# Patient Record
Sex: MALE | Race: WHITE | Employment: FULL TIME | URBAN - METROPOLITAN AREA
[De-identification: names, ages, dates, MRNs, and addresses within clinical notes are randomized per-mention and may not be internally consistent; named-entity substitution may affect disease eponyms.]

---

## 2019-12-16 NOTE — LETTER
[address]  [address]  Authorization for Surgical Operation and Procedure     Date:___________                                                                                                         Time:__________  I hereby authorize Surgeon(s):  Dex Jane DO, my physician and his/her assistants (if applicable), which may include medical students, residents, and/or fellows, to perform the following surgical operation/ procedure and administer such anesthesia as may be determined necessary by my physician:  Operation/Procedure name (s) Procedure(s):  CYSTOSCOPY, POSSIBLE LEFT RETROGRADE PYELOGRAM, REMOVAL OF LEFT URETERAL STENT on Augustus H Egan   2.   I recognize that during the surgical operation/procedure, unforeseen conditions may necessitate additional or different procedures than those listed above.  I, therefore, further authorize and request that the above-named surgeon, assistants, or designees perform such procedures as are, in their judgment, necessary and desirable.    3.   My surgeon/physician has discussed prior to my surgery the potential benefits, risks and side effects of this procedure; the likelihood of achieving goals; and potential problems that might occur during recuperation.  They also discussed reasonable alternatives to the procedure, including risks, benefits, and side effects related to the alternatives and risks related to not receiving this procedure.  I have had all my questions answered and I acknowledge that no guarantee has been made as to the result that may be obtained.    4.   Should the need arise during my operation/procedure, which includes change of level of care prior to discharge, I also consent to the administration of blood and/or blood products.  Further, I understand that despite careful testing and screening of blood or blood products by collecting agencies, I may still be subject to ill effects as a result of  receiving a blood transfusion and/or blood products.  The following are some, but not all, of the potential risks that can occur: fever and allergic reactions, hemolytic reactions, transmission of diseases such as Hepatitis, AIDS and Cytomegalovirus (CMV) and fluid overload.  In the event that I wish to have an autologous transfusion of my own blood, or a directed donor transfusion, I will discuss this with my physician.  Check only if Refusing Blood or Blood Products  I understand refusal of blood or blood products as deemed necessary by my physician may have serious consequences to my condition to include possible death. I hereby assume responsibility for my refusal and release the hospital, its personnel, and my physicians from any responsibility for the consequences of my refusal.          o  Refuse      5.   I authorize the use of any specimen, organs, tissues, body parts or foreign objects that may be removed from my body during the operation/procedure for diagnosis, research or teaching purposes and their subsequent disposal by hospital authorities.  I also authorize the release of specimen test results and/or written reports to my treating physician on the hospital medical staff or other referring or consulting physicians involved in my care, at the discretion of the Pathologist or my treating physician.    6.   I consent to the photographing or videotaping of the operations or procedures to be performed, including appropriate portions of my body for medical, scientific, or educational purposes, provided my identity is not revealed by the pictures or by descriptive texts accompanying them.  If the procedure has been photographed/videotaped, the surgeon will obtain the original picture, image, videotape or CD.  The hospital will not be responsible for storage, release or maintenance of the picture, image, tape or CD.    7.   I consent to the presence of a  or observers in the operating room  as deemed necessary by my physician or their designees.    8.   I recognize that in the event my procedure results in extended X-Ray/fluoroscopy time, I may develop a skin reaction.    9. If I have a Do Not Attempt Resuscitation (DNAR) order in place, that status will be suspended while in the operating room, procedural suite, and during the recovery period unless otherwise explicitly stated by me (or a person authorized to consent on my behalf). The surgeon or my attending physician will determine when the applicable recovery period ends for purposes of reinstating the DNAR order.  10. Patients having a sterilization procedure: I understand that if the procedure is successful the results will be permanent and it will therefore be impossible for me to inseminate, conceive, or bear children.  I also understand that the procedure is intended to result in sterility, although the result has not been guaranteed.   11. I acknowledge that my physician has explained sedation/analgesia administration to me including the risk and benefits I consent to the administration of sedation/analgesia as may be necessary or desirable in the judgment of my physician.    I CERTIFY THAT I HAVE READ AND FULLY UNDERSTAND THE ABOVE CONSENT TO OPERATION and/or OTHER PROCEDURE.    _________________________________________  __________________________________  Signature of Patient     Signature of Responsible Person         ___________________________________         Printed Name of Responsible Person           _________________________________                 Relationship to Patient  _________________________________________  ______________________________  Signature of Witness          Date  Time      Patient Name: Austin Mcfarland     : 1990                 Printed: 2025     Medical Record #: [id number]                     Page 1 of 2                                    [address]   [id number]    Consent for Anesthesia    IAustin agree to be cared for by an anesthesiologist, who is specially trained to monitor me and give me medicine to put me to sleep or keep me comfortable during my procedure    I understand that my anesthesiologist is not an employee or agent of [facility] or [employer]. He or she works for [employer].    As the patient asking for anesthesia services, I agree to:  Allow the anesthesiologist (anesthesia doctor) to give me medicine and do additional procedures as necessary. Some examples are: Starting or using an “IV” to give me medicine, fluids or blood during my procedure, and having a breathing tube placed to help me breathe when I’m asleep (intubation). In the event that my heart stops working properly, I understand that my anesthesiologist will make every effort to sustain my life, unless otherwise directed by [facility] Do Not Resuscitate documents.  Tell my anesthesia doctor before my procedure:  If I am pregnant.  The last time that I ate or drank.  All of the medicines I take (including prescriptions, herbal supplements, and pills I can buy without a prescription (including street drugs/illegal medications). Failure to inform my anesthesiologist about these medicines may increase my risk of anesthetic complications.  If I am allergic to anything or have had a reaction to anesthesia before.  I understand how the anesthesia medicine will help me (benefits).  I understand that with any type of anesthesia medicine there are risks:  The most common risks are: nausea, vomiting, sore throat, muscle soreness, damage to my eyes, mouth, or teeth (from breathing tube placement).  Rare risks include: remembering what happened during my procedure, allergic reactions to medications, injury to my airway, heart, lungs, vision, nerves, or muscles and in extremely rare instances death.  My doctor has explained to me other choices available  to me for my care (alternatives).  Pregnant Patients (“epidural”):  I understand that the risks of having an epidural (medicine given into my back to help control pain during labor), include itching, low blood pressure, difficulty urinating, headache or slowing of the baby’s heart. Very rare risks include infection, bleeding, seizure, irregular heart rhythms and nerve injury.  Regional Anesthesia (“spinal”, “epidural”, & “nerve blocks”):  I understand that rare but potential complications include headache, bleeding, infection, seizure, irregular heart rhythms, and nerve injury.    I can change my mind about having anesthesia services at any time before I get the medicine.    _____________________________________________________________________________  Patient (or Representative) Signature/Relationship to Patient  Date   Time    _____________________________________________________________________________   Name (if used)    Language/Organization   Time    _____________________________________________________________________________  Anesthesiologist Signature     Date   Time  I have discussed the procedure and information above with the patient (or patient’s representative) and answered their questions. The patient or their representative has agreed to have anesthesia services.    _____________________________________________________________________________  Witness        Date   Time  I have verified that the signature is that of the patient or patient’s representative, and that it was signed before the procedure  Patient Name: Austin Mcfarland     : 1990                 Printed: 2025     Medical Record #: [id number]                     Page 2 of 2   Patient undergoing workup for TAVR, Feeling better. I ordered his Cardiac CT, which will hopefully be done tomorrow. TAVR to be done as an outpatient. When medically stable, would discharge him to home. He has an HHA with him 5 days a week.

## 2022-01-21 ENCOUNTER — LAB REQUISITION (OUTPATIENT)
Dept: LAB | Facility: HOSPITAL | Age: 32
End: 2022-01-21
Payer: COMMERCIAL

## 2022-01-21 DIAGNOSIS — R10.9 UNSPECIFIED ABDOMINAL PAIN: ICD-10-CM

## 2022-01-21 DIAGNOSIS — N13.30 UNSPECIFIED HYDRONEPHROSIS: ICD-10-CM

## 2022-01-21 PROCEDURE — 82365 CALCULUS SPECTROSCOPY: CPT | Performed by: UROLOGY

## 2022-01-21 PROCEDURE — 88300 SURGICAL PATH GROSS: CPT | Performed by: UROLOGY

## 2022-01-26 LAB — CALCULI MASS: 4 MG

## 2022-05-24 ENCOUNTER — LAB ENCOUNTER (OUTPATIENT)
Dept: LAB | Age: 32
End: 2022-05-24
Attending: UROLOGY
Payer: COMMERCIAL

## 2022-05-24 ENCOUNTER — NURSE ONLY (OUTPATIENT)
Dept: LAB | Age: 32
End: 2022-05-24
Attending: UROLOGY
Payer: COMMERCIAL

## 2022-05-24 DIAGNOSIS — Z20.822 ENCOUNTER FOR PREOPERATIVE SCREENING LABORATORY TESTING FOR COVID-19 VIRUS: ICD-10-CM

## 2022-05-24 DIAGNOSIS — Z01.812 ENCOUNTER FOR PREOPERATIVE SCREENING LABORATORY TESTING FOR COVID-19 VIRUS: ICD-10-CM

## 2022-05-24 DIAGNOSIS — N13.30 HYDRONEPHROSIS, UNSPECIFIED HYDRONEPHROSIS TYPE: ICD-10-CM

## 2022-05-24 PROCEDURE — 86901 BLOOD TYPING SEROLOGIC RH(D): CPT

## 2022-05-24 PROCEDURE — 86900 BLOOD TYPING SEROLOGIC ABO: CPT

## 2022-05-24 PROCEDURE — 86850 RBC ANTIBODY SCREEN: CPT

## 2022-05-24 PROCEDURE — 36415 COLL VENOUS BLD VENIPUNCTURE: CPT

## 2022-05-25 ENCOUNTER — ANESTHESIA EVENT (OUTPATIENT)
Dept: SURGERY | Facility: HOSPITAL | Age: 32
End: 2022-05-25
Payer: COMMERCIAL

## 2022-05-25 LAB
ANTIBODY SCREEN: NEGATIVE
RH BLOOD TYPE: POSITIVE
SARS-COV-2 RNA RESP QL NAA+PROBE: NOT DETECTED

## 2022-05-27 ENCOUNTER — APPOINTMENT (OUTPATIENT)
Dept: GENERAL RADIOLOGY | Facility: HOSPITAL | Age: 32
End: 2022-05-27
Attending: UROLOGY
Payer: COMMERCIAL

## 2022-05-27 ENCOUNTER — HOSPITAL ENCOUNTER (INPATIENT)
Facility: HOSPITAL | Age: 32
LOS: 3 days | Discharge: HOME | DRG: 661 | End: 2022-05-30
Attending: UROLOGY | Admitting: UROLOGY
Payer: COMMERCIAL

## 2022-05-27 ENCOUNTER — HOSPITAL ENCOUNTER (INPATIENT)
Facility: HOSPITAL | Age: 32
LOS: 3 days | Discharge: HOME | End: 2022-05-30
Attending: UROLOGY | Admitting: UROLOGY
Payer: COMMERCIAL

## 2022-05-27 ENCOUNTER — ANESTHESIA (OUTPATIENT)
Dept: SURGERY | Facility: HOSPITAL | Age: 32
End: 2022-05-27
Payer: COMMERCIAL

## 2022-05-27 ENCOUNTER — APPOINTMENT (OUTPATIENT)
Dept: GENERAL RADIOLOGY | Facility: HOSPITAL | Age: 32
DRG: 661 | End: 2022-05-27
Attending: UROLOGY
Payer: COMMERCIAL

## 2022-05-27 DIAGNOSIS — N13.30 HYDRONEPHROSIS, UNSPECIFIED HYDRONEPHROSIS TYPE: ICD-10-CM

## 2022-05-27 DIAGNOSIS — Z01.812 ENCOUNTER FOR PREOPERATIVE SCREENING LABORATORY TESTING FOR COVID-19 VIRUS: Primary | ICD-10-CM

## 2022-05-27 DIAGNOSIS — Z20.822 ENCOUNTER FOR PREOPERATIVE SCREENING LABORATORY TESTING FOR COVID-19 VIRUS: Primary | ICD-10-CM

## 2022-05-27 DIAGNOSIS — N13.5 STRICTURE OR KINKING OF URETER: ICD-10-CM

## 2022-05-27 PROCEDURE — 3E0T3BZ INTRODUCTION OF ANESTHETIC AGENT INTO PERIPHERAL NERVES AND PLEXI, PERCUTANEOUS APPROACH: ICD-10-PCS | Performed by: UROLOGY

## 2022-05-27 PROCEDURE — 76942 ECHO GUIDE FOR BIOPSY: CPT | Performed by: ANESTHESIOLOGY

## 2022-05-27 PROCEDURE — 0TU787Z SUPPLEMENT LEFT URETER WITH AUTOLOGOUS TISSUE SUBSTITUTE, VIA NATURAL OR ARTIFICIAL OPENING ENDOSCOPIC: ICD-10-PCS | Performed by: UROLOGY

## 2022-05-27 PROCEDURE — 0TN78ZZ RELEASE LEFT URETER, VIA NATURAL OR ARTIFICIAL OPENING ENDOSCOPIC: ICD-10-PCS | Performed by: UROLOGY

## 2022-05-27 PROCEDURE — 0CB40ZZ EXCISION OF BUCCAL MUCOSA, OPEN APPROACH: ICD-10-PCS | Performed by: OTOLARYNGOLOGY

## 2022-05-27 PROCEDURE — 8E0W7CZ ROBOTIC ASSISTED PROCEDURE OF TRUNK REGION, VIA NATURAL OR ARTIFICIAL OPENING: ICD-10-PCS | Performed by: UROLOGY

## 2022-05-27 PROCEDURE — BT1F1ZZ FLUOROSCOPY OF LEFT KIDNEY, URETER AND BLADDER USING LOW OSMOLAR CONTRAST: ICD-10-PCS | Performed by: UROLOGY

## 2022-05-27 DEVICE — URETERAL STENT WITH SIDE HOLES 6FX28CM
Type: IMPLANTABLE DEVICE | Site: URETER | Status: FUNCTIONAL
Brand: TRIA™ SOFT

## 2022-05-27 RX ORDER — ROCURONIUM BROMIDE 10 MG/ML
INJECTION, SOLUTION INTRAVENOUS AS NEEDED
Status: DISCONTINUED | OUTPATIENT
Start: 2022-05-27 | End: 2022-05-27 | Stop reason: SURG

## 2022-05-27 RX ORDER — KETAMINE HYDROCHLORIDE 50 MG/ML
INJECTION, SOLUTION, CONCENTRATE INTRAMUSCULAR; INTRAVENOUS AS NEEDED
Status: DISCONTINUED | OUTPATIENT
Start: 2022-05-27 | End: 2022-05-27 | Stop reason: SURG

## 2022-05-27 RX ORDER — LIDOCAINE HYDROCHLORIDE ANHYDROUS AND DEXTROSE MONOHYDRATE .8; 5 G/100ML; G/100ML
INJECTION, SOLUTION INTRAVENOUS CONTINUOUS PRN
Status: DISCONTINUED | OUTPATIENT
Start: 2022-05-27 | End: 2022-05-27 | Stop reason: SURG

## 2022-05-27 RX ORDER — BUPIVACAINE HYDROCHLORIDE 2.5 MG/ML
INJECTION, SOLUTION EPIDURAL; INFILTRATION; INTRACAUDAL AS NEEDED
Status: DISCONTINUED | OUTPATIENT
Start: 2022-05-27 | End: 2022-05-27 | Stop reason: HOSPADM

## 2022-05-27 RX ORDER — SENNOSIDES 8.6 MG
17.2 TABLET ORAL NIGHTLY PRN
Status: DISCONTINUED | OUTPATIENT
Start: 2022-05-27 | End: 2022-05-30

## 2022-05-27 RX ORDER — HYDROMORPHONE HYDROCHLORIDE 1 MG/ML
0.6 INJECTION, SOLUTION INTRAMUSCULAR; INTRAVENOUS; SUBCUTANEOUS EVERY 5 MIN PRN
Status: DISCONTINUED | OUTPATIENT
Start: 2022-05-27 | End: 2022-05-27 | Stop reason: HOSPADM

## 2022-05-27 RX ORDER — BUPIVACAINE HYDROCHLORIDE 2.5 MG/ML
INJECTION, SOLUTION EPIDURAL; INFILTRATION; INTRACAUDAL AS NEEDED
Status: DISCONTINUED | OUTPATIENT
Start: 2022-05-27 | End: 2022-05-27 | Stop reason: SURG

## 2022-05-27 RX ORDER — DIPHENHYDRAMINE HCL 25 MG
25 CAPSULE ORAL NIGHTLY PRN
Status: DISCONTINUED | OUTPATIENT
Start: 2022-05-27 | End: 2022-05-30

## 2022-05-27 RX ORDER — CHLORHEXIDINE GLUCONATE 0.12 MG/ML
15 RINSE ORAL 2 TIMES DAILY
Status: DISCONTINUED | OUTPATIENT
Start: 2022-05-27 | End: 2022-05-30

## 2022-05-27 RX ORDER — DIPHENHYDRAMINE HYDROCHLORIDE 50 MG/ML
25 INJECTION INTRAMUSCULAR; INTRAVENOUS ONCE
Status: COMPLETED | OUTPATIENT
Start: 2022-05-27 | End: 2022-05-27

## 2022-05-27 RX ORDER — HYDROMORPHONE HYDROCHLORIDE 1 MG/ML
0.4 INJECTION, SOLUTION INTRAMUSCULAR; INTRAVENOUS; SUBCUTANEOUS EVERY 2 HOUR PRN
Status: DISCONTINUED | OUTPATIENT
Start: 2022-05-27 | End: 2022-05-30

## 2022-05-27 RX ORDER — HYDROMORPHONE HYDROCHLORIDE 1 MG/ML
0.2 INJECTION, SOLUTION INTRAMUSCULAR; INTRAVENOUS; SUBCUTANEOUS EVERY 5 MIN PRN
Status: DISCONTINUED | OUTPATIENT
Start: 2022-05-27 | End: 2022-05-27 | Stop reason: HOSPADM

## 2022-05-27 RX ORDER — OXYCODONE HYDROCHLORIDE 10 MG/1
10 TABLET ORAL EVERY 4 HOURS PRN
Status: DISCONTINUED | OUTPATIENT
Start: 2022-05-27 | End: 2022-05-28

## 2022-05-27 RX ORDER — HYDROCODONE BITARTRATE AND ACETAMINOPHEN 5; 325 MG/1; MG/1
2 TABLET ORAL ONCE AS NEEDED
Status: DISCONTINUED | OUTPATIENT
Start: 2022-05-27 | End: 2022-05-27 | Stop reason: HOSPADM

## 2022-05-27 RX ORDER — ONDANSETRON 2 MG/ML
INJECTION INTRAMUSCULAR; INTRAVENOUS AS NEEDED
Status: DISCONTINUED | OUTPATIENT
Start: 2022-05-27 | End: 2022-05-27 | Stop reason: SURG

## 2022-05-27 RX ORDER — HYDROMORPHONE HYDROCHLORIDE 1 MG/ML
INJECTION, SOLUTION INTRAMUSCULAR; INTRAVENOUS; SUBCUTANEOUS AS NEEDED
Status: DISCONTINUED | OUTPATIENT
Start: 2022-05-27 | End: 2022-05-27 | Stop reason: SURG

## 2022-05-27 RX ORDER — HYDROMORPHONE HYDROCHLORIDE 1 MG/ML
0.4 INJECTION, SOLUTION INTRAMUSCULAR; INTRAVENOUS; SUBCUTANEOUS EVERY 5 MIN PRN
Status: DISCONTINUED | OUTPATIENT
Start: 2022-05-27 | End: 2022-05-27 | Stop reason: HOSPADM

## 2022-05-27 RX ORDER — HYDROMORPHONE HYDROCHLORIDE 1 MG/ML
0.8 INJECTION, SOLUTION INTRAMUSCULAR; INTRAVENOUS; SUBCUTANEOUS EVERY 2 HOUR PRN
Status: DISCONTINUED | OUTPATIENT
Start: 2022-05-27 | End: 2022-05-30

## 2022-05-27 RX ORDER — ONDANSETRON 2 MG/ML
4 INJECTION INTRAMUSCULAR; INTRAVENOUS EVERY 6 HOURS PRN
Status: DISCONTINUED | OUTPATIENT
Start: 2022-05-27 | End: 2022-05-27 | Stop reason: HOSPADM

## 2022-05-27 RX ORDER — SODIUM CHLORIDE, SODIUM LACTATE, POTASSIUM CHLORIDE, CALCIUM CHLORIDE 600; 310; 30; 20 MG/100ML; MG/100ML; MG/100ML; MG/100ML
INJECTION, SOLUTION INTRAVENOUS CONTINUOUS
Status: DISCONTINUED | OUTPATIENT
Start: 2022-05-27 | End: 2022-05-27

## 2022-05-27 RX ORDER — PHENAZOPYRIDINE HYDROCHLORIDE 100 MG/1
200 TABLET, FILM COATED ORAL 3 TIMES DAILY PRN
Status: DISCONTINUED | OUTPATIENT
Start: 2022-05-27 | End: 2022-05-30

## 2022-05-27 RX ORDER — DIPHENHYDRAMINE HYDROCHLORIDE 50 MG/ML
12.5 INJECTION INTRAMUSCULAR; INTRAVENOUS NIGHTLY PRN
Status: DISCONTINUED | OUTPATIENT
Start: 2022-05-27 | End: 2022-05-30

## 2022-05-27 RX ORDER — MIDAZOLAM HYDROCHLORIDE 1 MG/ML
1 INJECTION INTRAMUSCULAR; INTRAVENOUS EVERY 5 MIN PRN
Status: DISCONTINUED | OUTPATIENT
Start: 2022-05-27 | End: 2022-05-27 | Stop reason: HOSPADM

## 2022-05-27 RX ORDER — LIDOCAINE HYDROCHLORIDE 10 MG/ML
INJECTION, SOLUTION EPIDURAL; INFILTRATION; INTRACAUDAL; PERINEURAL AS NEEDED
Status: DISCONTINUED | OUTPATIENT
Start: 2022-05-27 | End: 2022-05-27 | Stop reason: SURG

## 2022-05-27 RX ORDER — ONDANSETRON 2 MG/ML
4 INJECTION INTRAMUSCULAR; INTRAVENOUS EVERY 6 HOURS PRN
Status: DISCONTINUED | OUTPATIENT
Start: 2022-05-27 | End: 2022-05-30

## 2022-05-27 RX ORDER — HYDROMORPHONE HYDROCHLORIDE 1 MG/ML
INJECTION, SOLUTION INTRAMUSCULAR; INTRAVENOUS; SUBCUTANEOUS
Status: COMPLETED
Start: 2022-05-27 | End: 2022-05-27

## 2022-05-27 RX ORDER — SCOLOPAMINE TRANSDERMAL SYSTEM 1 MG/1
1 PATCH, EXTENDED RELEASE TRANSDERMAL ONCE
Status: DISCONTINUED | OUTPATIENT
Start: 2022-05-27 | End: 2022-05-27 | Stop reason: HOSPADM

## 2022-05-27 RX ORDER — DEXAMETHASONE SODIUM PHOSPHATE 10 MG/ML
INJECTION, SOLUTION INTRAMUSCULAR; INTRAVENOUS AS NEEDED
Status: DISCONTINUED | OUTPATIENT
Start: 2022-05-27 | End: 2022-05-27 | Stop reason: SURG

## 2022-05-27 RX ORDER — DIPHENHYDRAMINE HYDROCHLORIDE 50 MG/ML
INJECTION INTRAMUSCULAR; INTRAVENOUS
Status: COMPLETED
Start: 2022-05-27 | End: 2022-05-27

## 2022-05-27 RX ORDER — SODIUM CHLORIDE, SODIUM LACTATE, POTASSIUM CHLORIDE, CALCIUM CHLORIDE 600; 310; 30; 20 MG/100ML; MG/100ML; MG/100ML; MG/100ML
INJECTION, SOLUTION INTRAVENOUS CONTINUOUS
Status: DISCONTINUED | OUTPATIENT
Start: 2022-05-27 | End: 2022-05-27 | Stop reason: HOSPADM

## 2022-05-27 RX ORDER — MIDAZOLAM HYDROCHLORIDE 1 MG/ML
INJECTION INTRAMUSCULAR; INTRAVENOUS AS NEEDED
Status: DISCONTINUED | OUTPATIENT
Start: 2022-05-27 | End: 2022-05-27 | Stop reason: SURG

## 2022-05-27 RX ORDER — OXYCODONE HYDROCHLORIDE 5 MG/1
5 TABLET ORAL EVERY 4 HOURS PRN
Status: DISCONTINUED | OUTPATIENT
Start: 2022-05-27 | End: 2022-05-28

## 2022-05-27 RX ORDER — HYDROCODONE BITARTRATE AND ACETAMINOPHEN 5; 325 MG/1; MG/1
1 TABLET ORAL ONCE AS NEEDED
Status: DISCONTINUED | OUTPATIENT
Start: 2022-05-27 | End: 2022-05-27 | Stop reason: HOSPADM

## 2022-05-27 RX ORDER — ACETAMINOPHEN 325 MG/1
650 TABLET ORAL
Status: DISCONTINUED | OUTPATIENT
Start: 2022-05-27 | End: 2022-05-28

## 2022-05-27 RX ORDER — ACETAMINOPHEN 500 MG
1000 TABLET ORAL ONCE
Status: DISCONTINUED | OUTPATIENT
Start: 2022-05-27 | End: 2022-05-27 | Stop reason: HOSPADM

## 2022-05-27 RX ORDER — SODIUM CHLORIDE 9 MG/ML
INJECTION, SOLUTION INTRAVENOUS CONTINUOUS
Status: DISCONTINUED | OUTPATIENT
Start: 2022-05-27 | End: 2022-05-28

## 2022-05-27 RX ORDER — ACETAMINOPHEN 500 MG
1000 TABLET ORAL ONCE AS NEEDED
Status: DISCONTINUED | OUTPATIENT
Start: 2022-05-27 | End: 2022-05-27 | Stop reason: HOSPADM

## 2022-05-27 RX ORDER — DEXAMETHASONE SODIUM PHOSPHATE 4 MG/ML
VIAL (ML) INJECTION AS NEEDED
Status: DISCONTINUED | OUTPATIENT
Start: 2022-05-27 | End: 2022-05-27 | Stop reason: SURG

## 2022-05-27 RX ORDER — CEFAZOLIN SODIUM/WATER 2 G/20 ML
2 SYRINGE (ML) INTRAVENOUS EVERY 8 HOURS
Status: COMPLETED | OUTPATIENT
Start: 2022-05-27 | End: 2022-05-28

## 2022-05-27 RX ORDER — HEPARIN SODIUM 5000 [USP'U]/ML
5000 INJECTION, SOLUTION INTRAVENOUS; SUBCUTANEOUS ONCE
Status: COMPLETED | OUTPATIENT
Start: 2022-05-27 | End: 2022-05-27

## 2022-05-27 RX ORDER — FAMOTIDINE 20 MG/1
20 TABLET, FILM COATED ORAL 2 TIMES DAILY
Status: DISCONTINUED | OUTPATIENT
Start: 2022-05-27 | End: 2022-05-30

## 2022-05-27 RX ORDER — KETOROLAC TROMETHAMINE 30 MG/ML
INJECTION, SOLUTION INTRAMUSCULAR; INTRAVENOUS AS NEEDED
Status: DISCONTINUED | OUTPATIENT
Start: 2022-05-27 | End: 2022-05-27 | Stop reason: SURG

## 2022-05-27 RX ORDER — ACETAMINOPHEN 500 MG
1000 TABLET ORAL EVERY 6 HOURS PRN
COMMUNITY
End: 2022-05-29

## 2022-05-27 RX ORDER — CEFAZOLIN SODIUM/WATER 2 G/20 ML
2 SYRINGE (ML) INTRAVENOUS ONCE
Status: COMPLETED | OUTPATIENT
Start: 2022-05-27 | End: 2022-05-27

## 2022-05-27 RX ORDER — GLYCOPYRROLATE 0.2 MG/ML
INJECTION, SOLUTION INTRAMUSCULAR; INTRAVENOUS AS NEEDED
Status: DISCONTINUED | OUTPATIENT
Start: 2022-05-27 | End: 2022-05-27 | Stop reason: SURG

## 2022-05-27 RX ORDER — NALOXONE HYDROCHLORIDE 0.4 MG/ML
80 INJECTION, SOLUTION INTRAMUSCULAR; INTRAVENOUS; SUBCUTANEOUS AS NEEDED
Status: DISCONTINUED | OUTPATIENT
Start: 2022-05-27 | End: 2022-05-27 | Stop reason: HOSPADM

## 2022-05-27 RX ORDER — ONDANSETRON 4 MG/1
4 TABLET, FILM COATED ORAL EVERY 6 HOURS PRN
Status: DISCONTINUED | OUTPATIENT
Start: 2022-05-27 | End: 2022-05-30

## 2022-05-27 RX ORDER — PROCHLORPERAZINE EDISYLATE 5 MG/ML
5 INJECTION INTRAMUSCULAR; INTRAVENOUS EVERY 8 HOURS PRN
Status: DISCONTINUED | OUTPATIENT
Start: 2022-05-27 | End: 2022-05-27 | Stop reason: HOSPADM

## 2022-05-27 RX ORDER — NEOSTIGMINE METHYLSULFATE 1 MG/ML
INJECTION INTRAVENOUS AS NEEDED
Status: DISCONTINUED | OUTPATIENT
Start: 2022-05-27 | End: 2022-05-27 | Stop reason: SURG

## 2022-05-27 RX ORDER — ENOXAPARIN SODIUM 100 MG/ML
40 INJECTION SUBCUTANEOUS NIGHTLY
Status: DISCONTINUED | OUTPATIENT
Start: 2022-05-27 | End: 2022-05-30

## 2022-05-27 RX ADMIN — ONDANSETRON 4 MG: 2 INJECTION INTRAMUSCULAR; INTRAVENOUS at 12:26:00

## 2022-05-27 RX ADMIN — DEXAMETHASONE SODIUM PHOSPHATE 4 MG: 4 MG/ML VIAL (ML) INJECTION at 08:44:00

## 2022-05-27 RX ADMIN — LIDOCAINE HYDROCHLORIDE ANHYDROUS AND DEXTROSE MONOHYDRATE 2 MG/KG/HR: .8; 5 INJECTION, SOLUTION INTRAVENOUS at 08:42:00

## 2022-05-27 RX ADMIN — ROCURONIUM BROMIDE 20 MG: 10 INJECTION, SOLUTION INTRAVENOUS at 09:24:00

## 2022-05-27 RX ADMIN — BUPIVACAINE HYDROCHLORIDE 25 ML: 2.5 INJECTION, SOLUTION EPIDURAL; INFILTRATION; INTRACAUDAL at 08:37:00

## 2022-05-27 RX ADMIN — CEFAZOLIN SODIUM/WATER 2 G: 2 G/20 ML SYRINGE (ML) INTRAVENOUS at 12:26:00

## 2022-05-27 RX ADMIN — MIDAZOLAM HYDROCHLORIDE 2 MG: 1 INJECTION INTRAMUSCULAR; INTRAVENOUS at 08:24:00

## 2022-05-27 RX ADMIN — LIDOCAINE HYDROCHLORIDE ANHYDROUS AND DEXTROSE MONOHYDRATE 1.5 MG/KG/HR: .8; 5 INJECTION, SOLUTION INTRAVENOUS at 09:41:00

## 2022-05-27 RX ADMIN — SODIUM CHLORIDE, SODIUM LACTATE, POTASSIUM CHLORIDE, CALCIUM CHLORIDE: 600; 310; 30; 20 INJECTION, SOLUTION INTRAVENOUS at 08:23:00

## 2022-05-27 RX ADMIN — ROCURONIUM BROMIDE 50 MG: 10 INJECTION, SOLUTION INTRAVENOUS at 08:27:00

## 2022-05-27 RX ADMIN — SODIUM CHLORIDE, SODIUM LACTATE, POTASSIUM CHLORIDE, CALCIUM CHLORIDE: 600; 310; 30; 20 INJECTION, SOLUTION INTRAVENOUS at 12:48:00

## 2022-05-27 RX ADMIN — CEFAZOLIN SODIUM/WATER 2 G: 2 G/20 ML SYRINGE (ML) INTRAVENOUS at 08:26:00

## 2022-05-27 RX ADMIN — DEXAMETHASONE SODIUM PHOSPHATE 2 MG: 10 INJECTION, SOLUTION INTRAMUSCULAR; INTRAVENOUS at 08:37:00

## 2022-05-27 RX ADMIN — KETAMINE HYDROCHLORIDE 25 MG: 50 INJECTION, SOLUTION, CONCENTRATE INTRAMUSCULAR; INTRAVENOUS at 08:41:00

## 2022-05-27 RX ADMIN — LIDOCAINE HYDROCHLORIDE 100 MG: 10 INJECTION, SOLUTION EPIDURAL; INFILTRATION; INTRACAUDAL; PERINEURAL at 08:27:00

## 2022-05-27 RX ADMIN — GLYCOPYRROLATE 0.4 MG: 0.2 INJECTION, SOLUTION INTRAMUSCULAR; INTRAVENOUS at 12:26:00

## 2022-05-27 RX ADMIN — ROCURONIUM BROMIDE 20 MG: 10 INJECTION, SOLUTION INTRAVENOUS at 10:45:00

## 2022-05-27 RX ADMIN — HYDROMORPHONE HYDROCHLORIDE 0.5 MG: 1 INJECTION, SOLUTION INTRAMUSCULAR; INTRAVENOUS; SUBCUTANEOUS at 08:27:00

## 2022-05-27 RX ADMIN — HYDROMORPHONE HYDROCHLORIDE 0.5 MG: 1 INJECTION, SOLUTION INTRAMUSCULAR; INTRAVENOUS; SUBCUTANEOUS at 09:26:00

## 2022-05-27 RX ADMIN — KETOROLAC TROMETHAMINE 30 MG: 30 INJECTION, SOLUTION INTRAMUSCULAR; INTRAVENOUS at 12:26:00

## 2022-05-27 RX ADMIN — NEOSTIGMINE METHYLSULFATE 3 MG: 1 INJECTION INTRAVENOUS at 12:26:00

## 2022-05-27 NOTE — ANESTHESIA PROCEDURE NOTES
Regional Block  Performed by: Nahun Roman MD  Authorized by: Nahun Roman MD       General Information and Staff    Start Time:  5/27/2022 8:34 AM  End Time:  5/27/2022 8:37 AM  Anesthesiologist:  Nahun Roman MD  Patient Location:  OR    Block Placement: Post Induction  Site Identification: real time ultrasound guided and image stored and retrievable    Block site/laterality marked before start: site marked  Reason for Block: at surgeon's request and post-op pain management    Preanesthetic Checklist: 2 patient identifers, IV checked, risks and benefits discussed, monitors and equipment checked, pre-op evaluation, timeout performed, anesthesia consent, sterile technique used, no prohibitive neurological deficits and no local skin infection at insertion site      Procedure Details    Patient Position:  Supine  Prep: ChloraPrep    Monitoring:  Cardiac monitor, continuous pulse ox and blood pressure cuff  Block Type:  TAP  Laterality:  Left  Injection Technique:  Single-shot    Needle    Needle Type:  Short-bevel and echogenic  Needle Gauge:  21 G  Needle Length:  100 mm  Needle Localization:  Ultrasound guidance  Reason for Ultrasound Use: appropriate spread of the medication was noted in real time and no ultrasound evidence of intravascular and/or intraneural injection            Assessment    Injection Assessment:  Good spread noted, negative resistance, negative aspiration for heme, incremental injection and low pressure  Heart Rate Change: No    - Patient tolerated block procedure well without evidence of immediate block related complications.      Medications      Additional Comments    Medication:  Bupivacaine 0.25% 25mL + PF decadron

## 2022-05-27 NOTE — PROGRESS NOTES
Patient has IV fluids infusing, on room air, marion in place with pink/yellow urine, Dilaudid given for pain, ambulates with standby assist, tolerating a clear liquid diet-will advance as tolerated, incisions are C/D/I. Patient updated on plan of care.

## 2022-05-27 NOTE — ANESTHESIA PROCEDURE NOTES
Airway  Date/Time: 5/27/2022 8:30 AM  Urgency: elective      General Information and Staff    Patient location during procedure: OR  Anesthesiologist: Yanira Cabrera MD  Performed: anesthesiologist     Indications and Patient Condition  Indications for airway management: anesthesia  Spontaneous Ventilation: absent  Sedation level: deep  Preoxygenated: yes  Patient position: sniffing  Mask difficulty assessment: 1 - vent by mask    Final Airway Details  Final airway type: endotracheal airway      Successful airway: ETT  Cuffed: yes   Successful intubation technique: direct laryngoscopy  Facilitating devices/methods: intubating stylet  Endotracheal tube insertion site: oral  Blade: Alan  Blade size: #3  ETT size (mm): 7.5    Cormack-Lehane Classification: grade I - full view of glottis  Placement verified by: chest auscultation and capnometry   Measured from: lips  ETT to lips (cm): 22  Number of attempts at approach: 1  Number of other approaches attempted: 0    Additional Comments  Smooth induction. Eyes taped after induction, prior to intubation. Uncomplicated, successful intubation. Dentition same as previous, atraumatic.

## 2022-05-27 NOTE — ANESTHESIA POSTPROCEDURE EVALUATION
Tabitha 29 Patient Status:  Inpatient   Age/Gender 28year old male MRN [id number]   [facility] SURGERY Attending Lori Mcpherson MD   Hosp Day # 0 PCP FRANK LOERA       Anesthesia Post-op Note    XI ROBOT-ASSISTED LAPAROSCOPIC LEFT URETEROLYSIS,  BUCCAL MUCOSAL GRAFT URETEROPLASTY, CYSTOSCOPY, LEFT RETROGRADE PYELOGRAM, LEFT URETERAL STENT PLACEMENT, HARVEST OF BUCCAL TISSUE (DR Sabi More)    Procedure Summary     Date: 05/27/22 Room / Location: [address] [place] [address] MAIN OR 09 / 1404 [facility] OR    Anesthesia Start: 7240 Anesthesia Stop: 2871    Procedures:       XI ROBOT-ASSISTED LAPAROSCOPIC LEFT URETEROLYSIS,  BUCCAL MUCOSAL GRAFT URETEROPLASTY, CYSTOSCOPY, LEFT RETROGRADE PYELOGRAM, LEFT URETERAL STENT PLACEMENT, HARVEST OF BUCCAL TISSUE (DR MCNAIR) (Left Abdomen)      harvest of buccal tissue (N/A Mouth) Diagnosis:       Hydronephrosis, unspecified hydronephrosis type      Stricture or kinking of ureter      (Hydronephrosis, unspecified hydronephrosis type [N13.30]Stricture or kinking of ureter [N13.5])    Surgeons: Lori Mcpherson MD; Min Castaneda MD Anesthesiologist: Britta Junior MD    Anesthesia Type: general ASA Status: 2          Anesthesia Type: general    Vitals Value Taken Time   /86 05/27/22 1249   Temp 98.1 05/27/22 1249   Pulse 96 05/27/22 1249   Resp 20 05/27/22 1249   SpO2 99 05/27/22 1249       Patient Location: PACU    Anesthesia Type: general    Airway Patency: patent    Postop Pain Control: adequate    Mental Status: mildly sedated but able to meaningfully participate in the post-anesthesia evaluation    Nausea/Vomiting: none    Cardiopulmonary/Hydration status: stable euvolemic    Complications: no apparent anesthesia related complications    Postop vital signs: stable    Comments: signout give to VILMA Johnson    Dental Exam: Unchanged from Preop    Patient to be discharged from PACU when criteria met.

## 2022-05-27 NOTE — OPERATIVE REPORT
Tabitha 29 Patient Status:  Inpatient    1990 MRN [id number]   [facility] SURGERY Attending Chel De La O MD   Hosp Day # 0 PCP FRANK LOERA     Buccal Graft Op Note  Pre-Op Diagnosis:  Ureteral Stricture  Post-Op Diagnosis: Same  Procedure:  #1 Buccal mucosal graft  Surgeon: James Yarbrough  Anesthesia: General  Indications for Procedure:  Sravani Noland has had multiple surgeries for ureteral stricture. Dr. Chad Rojas evaluated the ureter in the OR and determined that he would benefit from a mucosal graft from the buccal mucosa. I was consulted intraoperatively to attain this. Procedure in Detail:  Patient was in the operating room in a lateral position. He was turned partially to supine. A bite block was placed in the right teeth for preparation of the left buccal mucosa. A betadine wash was done. An elliptical incision was marked in the buccal tissue, approx 3 x 2 cm. This was removed sharply with 15 blade and scissors. Bipolar cautery was used for hemostasis. The incision was closed with 4-0 horizontal mattress sutures. All instruments were removed from the oral cavity and nose and the patient was given back to urology. There were no complications. I performed all parts of this procedure. EBL:  10cc  Specimens:  None   UO: None  Condition:   To PACU stable  Vianney Alford MD  2022  11:56 AM

## 2022-05-28 LAB
ANION GAP SERPL CALC-SCNC: 6 MMOL/L (ref 0–18)
BASOPHILS # BLD AUTO: 0.02 X10(3) UL (ref 0–0.2)
BASOPHILS NFR BLD AUTO: 0.2 %
BUN BLD-MCNC: 8 MG/DL (ref 7–18)
CALCIUM BLD-MCNC: 8 MG/DL (ref 8.5–10.1)
CHLORIDE SERPL-SCNC: 106 MMOL/L (ref 98–112)
CO2 SERPL-SCNC: 28 MMOL/L (ref 21–32)
CREAT BLD-MCNC: 1.03 MG/DL
EOSINOPHIL # BLD AUTO: 0.01 X10(3) UL (ref 0–0.7)
EOSINOPHIL NFR BLD AUTO: 0.1 %
ERYTHROCYTE [DISTWIDTH] IN BLOOD BY AUTOMATED COUNT: 12.2 %
GLUCOSE BLD-MCNC: 104 MG/DL (ref 70–99)
HCT VFR BLD AUTO: 37.6 %
HGB BLD-MCNC: 13 G/DL
IMM GRANULOCYTES # BLD AUTO: 0.02 X10(3) UL (ref 0–1)
IMM GRANULOCYTES NFR BLD: 0.2 %
LYMPHOCYTES # BLD AUTO: 1.59 X10(3) UL (ref 1–4)
LYMPHOCYTES NFR BLD AUTO: 17.7 %
MCH RBC QN AUTO: 30.6 PG (ref 26–34)
MCHC RBC AUTO-ENTMCNC: 34.6 G/DL (ref 31–37)
MCV RBC AUTO: 88.5 FL
MONOCYTES # BLD AUTO: 0.76 X10(3) UL (ref 0.1–1)
MONOCYTES NFR BLD AUTO: 8.5 %
NEUTROPHILS # BLD AUTO: 6.56 X10 (3) UL (ref 1.5–7.7)
NEUTROPHILS # BLD AUTO: 6.56 X10(3) UL (ref 1.5–7.7)
NEUTROPHILS NFR BLD AUTO: 73.3 %
OSMOLALITY SERPL CALC.SUM OF ELEC: 289 MOSM/KG (ref 275–295)
PLATELET # BLD AUTO: 164 10(3)UL (ref 150–450)
POTASSIUM SERPL-SCNC: 4 MMOL/L (ref 3.5–5.1)
RBC # BLD AUTO: 4.25 X10(6)UL
SODIUM SERPL-SCNC: 140 MMOL/L (ref 136–145)
WBC # BLD AUTO: 9 X10(3) UL (ref 4–11)

## 2022-05-28 PROCEDURE — 80048 BASIC METABOLIC PNL TOTAL CA: CPT | Performed by: UROLOGY

## 2022-05-28 PROCEDURE — 85025 COMPLETE CBC W/AUTO DIFF WBC: CPT | Performed by: UROLOGY

## 2022-05-28 RX ORDER — HYDROCODONE BITARTRATE AND ACETAMINOPHEN 5; 325 MG/1; MG/1
2 TABLET ORAL EVERY 4 HOURS PRN
Status: DISCONTINUED | OUTPATIENT
Start: 2022-05-28 | End: 2022-05-29

## 2022-05-28 RX ORDER — HYDROCODONE BITARTRATE AND ACETAMINOPHEN 5; 325 MG/1; MG/1
1 TABLET ORAL EVERY 4 HOURS PRN
Status: DISCONTINUED | OUTPATIENT
Start: 2022-05-28 | End: 2022-05-29

## 2022-05-29 LAB
ANION GAP SERPL CALC-SCNC: 2 MMOL/L (ref 0–18)
BASOPHILS # BLD AUTO: 0.02 X10(3) UL (ref 0–0.2)
BASOPHILS NFR BLD AUTO: 0.3 %
BUN BLD-MCNC: 6 MG/DL (ref 7–18)
CALCIUM BLD-MCNC: 8.5 MG/DL (ref 8.5–10.1)
CHLORIDE SERPL-SCNC: 103 MMOL/L (ref 98–112)
CO2 SERPL-SCNC: 32 MMOL/L (ref 21–32)
CREAT BLD-MCNC: 0.84 MG/DL
EOSINOPHIL # BLD AUTO: 0.13 X10(3) UL (ref 0–0.7)
EOSINOPHIL NFR BLD AUTO: 2 %
ERYTHROCYTE [DISTWIDTH] IN BLOOD BY AUTOMATED COUNT: 12.1 %
GLUCOSE BLD-MCNC: 102 MG/DL (ref 70–99)
HCT VFR BLD AUTO: 38.9 %
HGB BLD-MCNC: 13.3 G/DL
IMM GRANULOCYTES # BLD AUTO: 0.02 X10(3) UL (ref 0–1)
IMM GRANULOCYTES NFR BLD: 0.3 %
LYMPHOCYTES # BLD AUTO: 1.58 X10(3) UL (ref 1–4)
LYMPHOCYTES NFR BLD AUTO: 24.7 %
MCH RBC QN AUTO: 30.7 PG (ref 26–34)
MCHC RBC AUTO-ENTMCNC: 34.2 G/DL (ref 31–37)
MCV RBC AUTO: 89.8 FL
MONOCYTES # BLD AUTO: 0.6 X10(3) UL (ref 0.1–1)
MONOCYTES NFR BLD AUTO: 9.4 %
NEUTROPHILS # BLD AUTO: 4.05 X10 (3) UL (ref 1.5–7.7)
NEUTROPHILS # BLD AUTO: 4.05 X10(3) UL (ref 1.5–7.7)
NEUTROPHILS NFR BLD AUTO: 63.3 %
OSMOLALITY SERPL CALC.SUM OF ELEC: 282 MOSM/KG (ref 275–295)
PLATELET # BLD AUTO: 149 10(3)UL (ref 150–450)
POTASSIUM SERPL-SCNC: 3.8 MMOL/L (ref 3.5–5.1)
RBC # BLD AUTO: 4.33 X10(6)UL
SODIUM SERPL-SCNC: 137 MMOL/L (ref 136–145)
WBC # BLD AUTO: 6.4 X10(3) UL (ref 4–11)

## 2022-05-29 PROCEDURE — 80048 BASIC METABOLIC PNL TOTAL CA: CPT | Performed by: UROLOGY

## 2022-05-29 PROCEDURE — 85025 COMPLETE CBC W/AUTO DIFF WBC: CPT | Performed by: UROLOGY

## 2022-05-29 RX ORDER — METOCLOPRAMIDE HYDROCHLORIDE 5 MG/ML
10 INJECTION INTRAMUSCULAR; INTRAVENOUS EVERY 6 HOURS PRN
Status: DISCONTINUED | OUTPATIENT
Start: 2022-05-29 | End: 2022-05-30

## 2022-05-29 RX ORDER — HYDROCODONE BITARTRATE AND ACETAMINOPHEN 10; 325 MG/1; MG/1
1 TABLET ORAL EVERY 4 HOURS PRN
Status: DISCONTINUED | OUTPATIENT
Start: 2022-05-29 | End: 2022-05-30

## 2022-05-29 RX ORDER — DOCUSATE SODIUM 100 MG/1
100 CAPSULE, LIQUID FILLED ORAL 2 TIMES DAILY
Status: DISCONTINUED | OUTPATIENT
Start: 2022-05-29 | End: 2022-05-30

## 2022-05-29 RX ORDER — HYDROCODONE BITARTRATE AND ACETAMINOPHEN 10; 325 MG/1; MG/1
1-2 TABLET ORAL EVERY 4 HOURS PRN
Qty: 24 TABLET | Refills: 0 | Status: SHIPPED | OUTPATIENT
Start: 2022-05-29

## 2022-05-29 RX ORDER — HYDROCODONE BITARTRATE AND ACETAMINOPHEN 10; 325 MG/1; MG/1
1-2 TABLET ORAL EVERY 4 HOURS PRN
Qty: 24 TABLET | Refills: 0 | Status: SHIPPED | OUTPATIENT
Start: 2022-05-29 | End: 2022-05-29

## 2022-05-29 RX ORDER — PSEUDOEPHEDRINE HCL 30 MG
100 TABLET ORAL 2 TIMES DAILY
Qty: 60 CAPSULE | Refills: 0 | Status: SHIPPED | COMMUNITY
Start: 2022-05-29

## 2022-05-29 RX ORDER — HYDROCODONE BITARTRATE AND ACETAMINOPHEN 10; 325 MG/1; MG/1
2 TABLET ORAL EVERY 4 HOURS PRN
Status: DISCONTINUED | OUTPATIENT
Start: 2022-05-29 | End: 2022-05-30

## 2022-05-30 VITALS
WEIGHT: 158 LBS | SYSTOLIC BLOOD PRESSURE: 127 MMHG | OXYGEN SATURATION: 97 % | DIASTOLIC BLOOD PRESSURE: 79 MMHG | BODY MASS INDEX: 20.94 KG/M2 | TEMPERATURE: 99 F | HEIGHT: 73 IN | HEART RATE: 94 BPM | RESPIRATION RATE: 18 BRPM

## 2022-05-30 LAB
ANION GAP SERPL CALC-SCNC: 4 MMOL/L (ref 0–18)
BASOPHILS # BLD AUTO: 0.02 X10(3) UL (ref 0–0.2)
BASOPHILS NFR BLD AUTO: 0.4 %
BUN BLD-MCNC: 6 MG/DL (ref 7–18)
CALCIUM BLD-MCNC: 8.9 MG/DL (ref 8.5–10.1)
CHLORIDE SERPL-SCNC: 102 MMOL/L (ref 98–112)
CO2 SERPL-SCNC: 30 MMOL/L (ref 21–32)
CREAT BLD-MCNC: 0.8 MG/DL
EOSINOPHIL # BLD AUTO: 0.17 X10(3) UL (ref 0–0.7)
EOSINOPHIL NFR BLD AUTO: 3.2 %
ERYTHROCYTE [DISTWIDTH] IN BLOOD BY AUTOMATED COUNT: 11.9 %
GLUCOSE BLD-MCNC: 112 MG/DL (ref 70–99)
HCT VFR BLD AUTO: 39.8 %
HGB BLD-MCNC: 13.8 G/DL
IMM GRANULOCYTES # BLD AUTO: 0.01 X10(3) UL (ref 0–1)
IMM GRANULOCYTES NFR BLD: 0.2 %
LYMPHOCYTES # BLD AUTO: 1.27 X10(3) UL (ref 1–4)
LYMPHOCYTES NFR BLD AUTO: 23.7 %
MCH RBC QN AUTO: 30.7 PG (ref 26–34)
MCHC RBC AUTO-ENTMCNC: 34.7 G/DL (ref 31–37)
MCV RBC AUTO: 88.4 FL
MONOCYTES # BLD AUTO: 0.6 X10(3) UL (ref 0.1–1)
MONOCYTES NFR BLD AUTO: 11.2 %
NEUTROPHILS # BLD AUTO: 3.29 X10 (3) UL (ref 1.5–7.7)
NEUTROPHILS # BLD AUTO: 3.29 X10(3) UL (ref 1.5–7.7)
NEUTROPHILS NFR BLD AUTO: 61.3 %
OSMOLALITY SERPL CALC.SUM OF ELEC: 280 MOSM/KG (ref 275–295)
PLATELET # BLD AUTO: 170 10(3)UL (ref 150–450)
POTASSIUM SERPL-SCNC: 3.8 MMOL/L (ref 3.5–5.1)
RBC # BLD AUTO: 4.5 X10(6)UL
SODIUM SERPL-SCNC: 136 MMOL/L (ref 136–145)
WBC # BLD AUTO: 5.4 X10(3) UL (ref 4–11)

## 2022-05-30 PROCEDURE — 80048 BASIC METABOLIC PNL TOTAL CA: CPT | Performed by: UROLOGY

## 2022-05-30 PROCEDURE — 85025 COMPLETE CBC W/AUTO DIFF WBC: CPT | Performed by: UROLOGY

## 2022-05-30 RX ORDER — CHLORHEXIDINE GLUCONATE 0.12 MG/ML
15 RINSE ORAL 2 TIMES DAILY
Qty: 473 ML | Refills: 0 | Status: SHIPPED | OUTPATIENT
Start: 2022-05-30

## 2022-05-30 NOTE — PROGRESS NOTES
NURSING DISCHARGE NOTE    Discharged Home via Wheelchair. Accompanied by Support staff  Belongings Taken by patient/family     Verbal and written discharge instructions given to patient. Verbalized understanding . With tolerable pain. To home in stable condition. Noemí Moya

## 2025-02-24 ENCOUNTER — APPOINTMENT (OUTPATIENT)
Dept: CT IMAGING | Facility: HOSPITAL | Age: 35
End: 2025-02-24
Attending: EMERGENCY MEDICINE
Payer: COMMERCIAL

## 2025-02-24 ENCOUNTER — HOSPITAL ENCOUNTER (EMERGENCY)
Facility: HOSPITAL | Age: 35
Discharge: HOME | End: 2025-02-24
Attending: EMERGENCY MEDICINE
Payer: COMMERCIAL

## 2025-02-24 VITALS
RESPIRATION RATE: 12 BRPM | BODY MASS INDEX: 22.53 KG/M2 | HEIGHT: 73 IN | HEART RATE: 105 BPM | SYSTOLIC BLOOD PRESSURE: 102 MMHG | OXYGEN SATURATION: 99 % | TEMPERATURE: 98 F | DIASTOLIC BLOOD PRESSURE: 69 MMHG | WEIGHT: 170 LBS

## 2025-02-24 DIAGNOSIS — R10.9 FLANK PAIN: Primary | ICD-10-CM

## 2025-02-24 LAB
ALBUMIN SERPL-MCNC: 4.8 G/DL (ref 3.2–4.8)
ALBUMIN/GLOB SERPL: 2 {RATIO} (ref 1–2)
ALP LIVER SERPL-CCNC: 77 U/L
ALT SERPL-CCNC: 23 U/L
ANION GAP SERPL CALC-SCNC: 5 MMOL/L (ref 0–18)
AST SERPL-CCNC: 44 U/L (ref ?–34)
BASOPHILS # BLD AUTO: 0.03 X10(3) UL (ref 0–0.2)
BASOPHILS NFR BLD AUTO: 0.6 %
BILIRUB SERPL-MCNC: 0.5 MG/DL (ref 0.3–1.2)
BILIRUB UR QL STRIP.AUTO: NEGATIVE
BUN BLD-MCNC: 9 MG/DL (ref 9–23)
CALCIUM BLD-MCNC: 9.4 MG/DL (ref 8.7–10.6)
CHLORIDE SERPL-SCNC: 104 MMOL/L (ref 98–112)
CLARITY UR REFRACT.AUTO: CLEAR
CO2 SERPL-SCNC: 31 MMOL/L (ref 21–32)
CREAT BLD-MCNC: 1.08 MG/DL
EGFRCR SERPLBLD CKD-EPI 2021: 93 ML/MIN/1.73M2 (ref 60–?)
EOSINOPHIL # BLD AUTO: 0.05 X10(3) UL (ref 0–0.7)
EOSINOPHIL NFR BLD AUTO: 1 %
ERYTHROCYTE [DISTWIDTH] IN BLOOD BY AUTOMATED COUNT: 12.4 %
GLOBULIN PLAS-MCNC: 2.4 G/DL (ref 2–3.5)
GLUCOSE BLD-MCNC: 92 MG/DL (ref 70–99)
GLUCOSE UR STRIP.AUTO-MCNC: NORMAL MG/DL
HCT VFR BLD AUTO: 43.2 %
HGB BLD-MCNC: 15 G/DL
IMM GRANULOCYTES # BLD AUTO: 0 X10(3) UL (ref 0–1)
IMM GRANULOCYTES NFR BLD: 0 %
KETONES UR STRIP.AUTO-MCNC: NEGATIVE MG/DL
LEUKOCYTE ESTERASE UR QL STRIP.AUTO: NEGATIVE
LIPASE SERPL-CCNC: 27 U/L (ref 12–53)
LYMPHOCYTES # BLD AUTO: 1.87 X10(3) UL (ref 1–4)
LYMPHOCYTES NFR BLD AUTO: 38 %
MCH RBC QN AUTO: 30.7 PG (ref 26–34)
MCHC RBC AUTO-ENTMCNC: 34.7 G/DL (ref 31–37)
MCV RBC AUTO: 88.5 FL
MONOCYTES # BLD AUTO: 0.35 X10(3) UL (ref 0.1–1)
MONOCYTES NFR BLD AUTO: 7.1 %
NEUTROPHILS # BLD AUTO: 2.62 X10 (3) UL (ref 1.5–7.7)
NEUTROPHILS # BLD AUTO: 2.62 X10(3) UL (ref 1.5–7.7)
NEUTROPHILS NFR BLD AUTO: 53.3 %
NITRITE UR QL STRIP.AUTO: NEGATIVE
OSMOLALITY SERPL CALC.SUM OF ELEC: 288 MOSM/KG (ref 275–295)
PH UR STRIP.AUTO: 8 [PH] (ref 5–8)
PLATELET # BLD AUTO: 203 10(3)UL (ref 150–450)
POTASSIUM SERPL-SCNC: 4.2 MMOL/L (ref 3.5–5.1)
PROT SERPL-MCNC: 7.2 G/DL (ref 5.7–8.2)
PROT UR STRIP.AUTO-MCNC: NEGATIVE MG/DL
RBC # BLD AUTO: 4.88 X10(6)UL
RBC UR QL AUTO: NEGATIVE
SODIUM SERPL-SCNC: 140 MMOL/L (ref 136–145)
SP GR UR STRIP.AUTO: 1.02 (ref 1–1.03)
UROBILINOGEN UR STRIP.AUTO-MCNC: NORMAL MG/DL
WBC # BLD AUTO: 4.9 X10(3) UL (ref 4–11)

## 2025-02-24 PROCEDURE — 96376 TX/PRO/DX INJ SAME DRUG ADON: CPT

## 2025-02-24 PROCEDURE — 83690 ASSAY OF LIPASE: CPT | Performed by: EMERGENCY MEDICINE

## 2025-02-24 PROCEDURE — 99285 EMERGENCY DEPT VISIT HI MDM: CPT

## 2025-02-24 PROCEDURE — 83690 ASSAY OF LIPASE: CPT

## 2025-02-24 PROCEDURE — 85025 COMPLETE CBC W/AUTO DIFF WBC: CPT | Performed by: EMERGENCY MEDICINE

## 2025-02-24 PROCEDURE — 96374 THER/PROPH/DIAG INJ IV PUSH: CPT

## 2025-02-24 PROCEDURE — 96361 HYDRATE IV INFUSION ADD-ON: CPT

## 2025-02-24 PROCEDURE — 81003 URINALYSIS AUTO W/O SCOPE: CPT | Performed by: EMERGENCY MEDICINE

## 2025-02-24 PROCEDURE — 81003 URINALYSIS AUTO W/O SCOPE: CPT

## 2025-02-24 PROCEDURE — 74176 CT ABD & PELVIS W/O CONTRAST: CPT | Performed by: EMERGENCY MEDICINE

## 2025-02-24 PROCEDURE — 80053 COMPREHEN METABOLIC PANEL: CPT | Performed by: EMERGENCY MEDICINE

## 2025-02-24 PROCEDURE — 85025 COMPLETE CBC W/AUTO DIFF WBC: CPT

## 2025-02-24 PROCEDURE — 96375 TX/PRO/DX INJ NEW DRUG ADDON: CPT

## 2025-02-24 PROCEDURE — 80053 COMPREHEN METABOLIC PANEL: CPT

## 2025-02-24 RX ORDER — ONDANSETRON 2 MG/ML
4 INJECTION INTRAMUSCULAR; INTRAVENOUS ONCE
Status: COMPLETED | OUTPATIENT
Start: 2025-02-24 | End: 2025-02-24

## 2025-02-24 RX ORDER — HYDROMORPHONE HYDROCHLORIDE 1 MG/ML
0.5 INJECTION, SOLUTION INTRAMUSCULAR; INTRAVENOUS; SUBCUTANEOUS ONCE
Status: COMPLETED | OUTPATIENT
Start: 2025-02-24 | End: 2025-02-24

## 2025-02-24 RX ORDER — HYDROCODONE BITARTRATE AND ACETAMINOPHEN 5; 325 MG/1; MG/1
1-2 TABLET ORAL EVERY 6 HOURS PRN
Qty: 12 TABLET | Refills: 0 | Status: SHIPPED | OUTPATIENT
Start: 2025-02-24

## 2025-02-24 RX ORDER — DIPHENHYDRAMINE HCL 25 MG
25 CAPSULE ORAL ONCE
Status: COMPLETED | OUTPATIENT
Start: 2025-02-24 | End: 2025-02-24

## 2025-02-24 RX ORDER — KETOROLAC TROMETHAMINE 15 MG/ML
15 INJECTION, SOLUTION INTRAMUSCULAR; INTRAVENOUS ONCE
Status: COMPLETED | OUTPATIENT
Start: 2025-02-24 | End: 2025-02-24

## 2025-02-24 RX ORDER — SODIUM CHLORIDE 9 MG/ML
INJECTION, SOLUTION INTRAVENOUS CONTINUOUS
Status: DISCONTINUED | OUTPATIENT
Start: 2025-02-24 | End: 2025-02-24

## 2025-02-24 NOTE — ED INITIAL ASSESSMENT (HPI)
PT states that he has been feeling left flank pain since this morning, got worse during the day, nausea, unable to contact urologist, hx of kidney stones. PT states current pain level 10/10

## 2025-02-25 NOTE — ED PROVIDER NOTES
Patient Seen in: [facility] Emergency Department      History     Chief Complaint   Patient presents with    Abdomen/Flank Pain     Stated Complaint: hx of kidney stones, having abd pain    Subjective:   HPI      34-year-old man past medical history as below presents with left-sided flank pain.  Sharp and stabbing pain started at 4:30 AM this morning.  History of kidney stones and this feels similar.  Note some dysuria but no hematuria.  No fevers.  Some associated nausea and vomiting.    Objective:     Past Medical History:    Anxiety    Calculus of kidney    Congenital obstruction of ureteropelvic junction    Depression    Renal disorder    congenital left ureter stricture               Past Surgical History:   Procedure Laterality Date    Cystoscopy,+ureteroscopy Left 7/31/10    s/p left rpg, left urs, left renoscopy, cysto, with stent placement 7-31-10 at [facility], Dr John Sheffield    Cystoscopy,+ureteroscopy Left 12/3/10    L URS, Dr. Sheffield    Cystoscopy,+ureteroscopy Left     Cysto, URS, RPG, stent Bladder Stone removal-Dr Wilson    Cystoscopy,+ureteroscopy Left 3/19/15    no stent placed, [facility]    Cystoscopy,insert ureteral stent  4/22/2014    Procedure: LITHOTRIPSY WITH CYSTOSCOPY, STENT PLACEMENT;  Surgeon: Dex Jane DO;  Location: [facility]    Cystoscopy,remv calculus,simple  12/27/07    Performed by JOHN SHEFFIELD at [facility], [facility]    Cystoscopy,remv calculus,simple  5/23/08    Performed by JOHN SHEFFIELD at [facility], [facility]    Cystourethroscopy Left 12/27/10    cysto stent removal- Dr. Sheffield    Cystourethroscopy Left 12/19/14    Cysto Stent Removal-Dr Jane    Cystourethroscopy,ureter catheter  3/10/08    Performed by JOHN SHEFFIELD at [facility], [facility]    Cystourethroscopy,ureter catheter  4/22/2014    Procedure: LITHOTRIPSY WITH CYSTOSCOPY, STENT PLACEMENT;  Surgeon: Dex Jane DO;  Location: [facility]    Fragmenting of kidney  stone  9/25/07    Performed by DEBORAH CALDERON at [facility], [facility]    Fragmenting of kidney stone  9/25/07    Performed by DEBORAH CALDERON at [facility], [facility]    Fragmenting of kidney stone  4/22/2014    Procedure: LITHOTRIPSY WITH CYSTOSCOPY, STENT PLACEMENT;  Surgeon: Dex Jane DO;  Location: [facility], [facility]    Ir nephhrostomy tube  2007    Cysto stent removal, nephrostomy tube placement    Laparoscopy, surgical; pyeloplasty  Oct 11, 2007    Left UPJ repair    Lithotripsy Left June 21, 2007    Left ESWL    Other surgical history  10/8/16    Cysto, Lt RPG, Lt URS & Nephroscopy, Stone Manipulation, Stone Basket & Removal, Stent Placement-Dr. Calderon     Other surgical history      left pcnl cdh    Other surgical history      stent placement cdh    Other surgical history  12/5/16    cysto stent removal dr calderon    Other surgical history  12/11/2020    Cysto/stent removal Dr. Su    Other surgical history  06/25/2021    CYSTOSCOPY, LEFT DIAGNOSTIC URETEROSCOPY, LEFT RETROGRADE PYELOGRAM, LEFT URETERAL STENT PLACEMENT,    Other surgical history  06/11/2021    CYSTOSCOPY,LEFT URETEROSCOPY,, RETROGRADE PYELOGRAM, LEFT STENT INSERTION    Other surgical history  01/21/2022    Cysto, Lt URS, RPHG, LL, Stone Extraction, Lt Stent Placement - Dr. Steel    Other surgical history  02/03/2022    Cysto Stent Removal- Dr. Steel    Renal endoscopy  3/10/08    Performed by DEBORAH CALDERON at [facility]    Special service or report  May 9, 2007    Cysto, stone extraction    Special service or report  Sept. 20, 2006    Neph tube insertion with endopyelotomy    Special service or report  Oct 21, 2007    Larger ureteral stent placed    Urology surgery procedure unlisted  5/23/08    Performed by DEBORAH CALDERON at [facility]    X-ray antegrade pyelogram tube  3/10/08    Performed by DEBORAH CALDERON at [facility]    X-ray retrograde pyelogram  3/10/08    Performed by  DEBORAH KOHLI at [facility]    X-ray retrograde pyelogram  5/23/08    Performed by DEBORAH KOHLI at [facility]    X-ray retrograde pyelogram  4/22/2014    Procedure: LITHOTRIPSY WITH CYSTOSCOPY, STENT PLACEMENT;  Surgeon: Dex Jane DO;  Location: [facility]                Social History     Socioeconomic History    Marital status: Single   Tobacco Use    Smoking status: Never    Smokeless tobacco: Never   Vaping Use    Vaping status: Never Used   Substance and Sexual Activity    Alcohol use: Yes     Comment: ocassionally    Drug use: Yes     Frequency: 7.0 times per week     Types: Cannabis     Comment: medical     Social Drivers of Health     Food Insecurity: Low Risk  (2/19/2024)    Received from [facility]    Food Insecurity     Have there been times that your food ran out, and you didn't have money to get more?: No     Are there times that you worry that this might happen?: No   Transportation Needs: Low Risk  (2/19/2024)    Received from [facility]    Transportation Needs     Do you have trouble getting transportation to medical appointments?: No                  Physical Exam     ED Triage Vitals [02/24/25 1611]   /67   Pulse 88   Resp 12   Temp 98.3 °F (36.8 °C)   Temp src Oral   SpO2 97 %   O2 Device None (Room air)       Current Vitals:   Vital Signs  BP: 102/69  Pulse: 105  Resp: 12  Temp: 98.3 °F (36.8 °C)  Temp src: Oral  MAP (mmHg): 79    Oxygen Therapy  SpO2: 99 %  O2 Device: None (Room air)        Physical Exam  Constitutional:       General: Appears uncomfortable     Appearance: Is not ill-appearing.   HENT:      Head: Normocephalic and atraumatic.      Mouth/Throat:      Mouth: Mucous membranes are moist.   Eyes:      Conjunctiva/sclera: Conjunctivae normal.      Pupils: Pupils are equal, round, and reactive to light.   Cardiovascular:      Rate and Rhythm: Normal rate and regular rhythm.    Pulmonary:      Effort: Pulmonary effort is normal. No respiratory distress.      Breath sounds: Normal breath sounds.   Abdominal: Left CVA tenderness to percussion     General: Abdomen is flat. There is no distension.      Tenderness: There is no abdominal tenderness.   Musculoskeletal:      Right lower leg: No edema.      Left lower leg: No edema.   Skin:     General: Skin is warm and dry.   Neurological:      General: No focal deficit present.      Mental Status: He is alert.       ED Course     Labs Reviewed   COMP METABOLIC PANEL (14) - Abnormal; Notable for the following components:       Result Value    AST 44 (*)     All other components within normal limits   LIPASE - Normal   CBC WITH DIFFERENTIAL WITH PLATELET   URINALYSIS, ROUTINE   RAINBOW DRAW BLUE                   MDM      Considered all emergent etiologies, differential includes but is not limited to: Kidney stone, pyelonephritis, ureteral stricture     I have independently visualized the radiology images, my focus/limited interpretation: CT scan no free fluid     Defer to radiologist for other/incidental findings    Labs largely unrevealing.  UA without evidence of infection.  CT scan showing largely chronic findings and notable for dilation of left sided collecting system likely secondary to ureteral stricture, no obstructing calculi noted. D/w urology Dr. Ghosh, plan for outpatient followup as pain is controlled in ED. If recurrent pain will likely require stent.        Medical Decision Making      Disposition and Plan     Clinical Impression:  1. Flank pain         Disposition:  There is no disposition on file for this visit.  There is no disposition time on file for this visit.    Follow-up:  No Mendoza  [phone]    Follow up      Charlene Johnson MD  [address] [facility]  [address] [place] [id number]  [phone]    Follow up            Medications Prescribed:  Current Discharge Medication List              Supplementary Documentation:

## 2025-02-28 NOTE — DISCHARGE INSTRUCTIONS
HOME INSTRUCTIONS    Cystoscopy  Cystoscopy is a procedure that lets your healthcare provider look directly inside your urethra and bladder. It can be used to:  Help diagnose a problem with your urethra, bladder, or kidneys.  Take a sample (biopsy) of bladder or urethral tissue.  Treat certain problems (such as removing kidney stones).  Place a tube (stent) to bypass a blockage.  Take special X-rays of the kidneys.  Based on the findings, your provider may advise other tests or treatments.  What is a cystoscope?  A cystoscope is a telescope-like tube that contains lenses and small glass wires that make bright light (fiberoptics). The cystoscope may be straight and rigid. Or it may be flexible to bend around curves in the urethra. The healthcare provider may look directly into the cystoscope, or project the image onto a screen.    Getting ready  Ask your healthcare provider if you should stop taking any medicines before the procedure.  Follow any directions you're given for not eating or drinking before the procedure.  Follow any other instructions your healthcare provider gives you.    Tell your healthcare provider before the exam if you:  Take any medicines, such as aspirin or blood thinners. This also includes any over-the-counter and prescription medicines, vitamins, herbs, and other supplements.  Have allergies to any medicines  Are pregnant or think you may be pregnant    During the procedure  Cystoscopy is done in the healthcare provider’s office, surgery center, or hospital. The doctor and a nurse are present during the procedure. It takes only a few minutes. It takes longer if a biopsy, X-ray, or treatment needs to be done.  During the procedure:  You lie on an exam table on your back, knees bent and legs apart. You're covered with a drape.  Your urethra and the area around it are washed. Anesthetic jelly may be applied to numb the urethra. Other pain medicine is often not needed. In some cases, you may be  offered a mild sedative to help you relax. If a more extensive procedure is to be done, such as a biopsy or kidney stone removal, general anesthesia may be needed. Often a one-time antibiotic is given.  The cystoscope is inserted. A sterile fluid is put into the bladder to expand it. You may feel pressure from this fluid.  When the procedure is done, the cystoscope is removed.  After the procedure  If you had a sedative, general anesthesia, or spinal anesthesia, you must have someone drive you home. Once you’re home:  Drink plenty of fluids.  You may have burning or light bleeding when you pee. This is normal.  Medicines may be prescribed to ease any mild pain or prevent infection. Take these as directed.  When to call your healthcare provider  Call your healthcare provider if you have any of the following after the procedure:  Heavy bleeding or blood clots  Burning that lasts more than 1 day  Fever of   100° F  ( 38°C ) or higher, or as directed by your provider  Trouble peeing     [employer] last reviewed this educational content on 10/1/2021  © 9958-7759 [employer]. All rights reserved. This information is not intended as a substitute for professional medical care. Always follow your healthcare professional's instructions.        AMBSURG HOME CARE INSTRUCTIONS: POST-OP ANESTHESIA  The medication that you received for sedation or general anesthesia can last up to 24 hours. Your judgment and reflexes may be altered, even if you feel like your normal self.      We Recommend:   Do not drive any motor vehicle or bicycle   Avoid mowing the lawn, playing sports, or working with power tools/applicances (power saws, electric knives or mixers)   That you have someone stay with you on your first night home   Do not drink alcohol or take sleeping pills or tranquilizers   Do not sign legal documents within 24 hours of your procedure   If you had a nerve block for your surgery, take extra care not to put any  pressure on your arm or hand for 24 hours    It is normal:  For you to have a sore throat if you had a breathing tube during surgery (while you were asleep!). The sore throat should get better within 48 hours. You can gargle with warm salt water (1/2 tsp in 4 oz warm water) or use a throat lozenge for comfort  To feel muscle aches or soreness especially in the abdomen, chest or neck. The achy feeling should go away in the next 24 hours  To feel weak, sleepy or \"wiped out\". Your should start feeling better in the next 24 hours.   To experience mild discomforts such as sore lip or tongue, headache, cramps, gas pains or a bloated feeling in your abdomen.   To experience mild back pain or soreness for a day or two if you had spinal or epidural anesthesia.   If you had laparoscopic surgery, to feel shoulder pain or discomfort on the day of surgery.   For some patients to have nausea after surgery/anesthesia    If you feel nausea or experience vomiting:   Try to move around less.   Eat less than usual or drink only liquids until the next morning   Nausea should resolve in about 24 hours    If you have a problem when you are at home:    Call your surgeons office   Discharge Instructions: After Your Surgery  You’ve just had surgery. During surgery, you were given medicine called anesthesia to keep you relaxed and free of pain. After surgery, you may have some pain or nausea. This is common. Here are some tips for feeling better and getting well after surgery.   Going home  Your healthcare provider will show you how to take care of yourself when you go home. They'll also answer your questions. Have an adult family member or friend drive you home. For the first 24 hours after your surgery:   Don't drive or use heavy equipment.  Don't make important decisions or sign legal papers.  Take medicines as directed.  Don't drink alcohol.  Have someone stay with you, if needed. They can watch for problems and help keep you safe.  Be  sure to go to all follow-up visits with your healthcare provider. And rest after your surgery for as long as your provider tells you to.   Coping with pain  If you have pain after surgery, pain medicine will help you feel better. Take it as directed, before pain becomes severe. Also, ask your healthcare provider or pharmacist about other ways to control pain. This might be with heat, ice, or relaxation. And follow any other instructions your surgeon or nurse gives you.      Stay on schedule with your medicine.     Tips for taking pain medicine  To get the best relief possible, remember these points:   Pain medicines can upset your stomach. Taking them with a little food may help.  Most pain relievers taken by mouth need at least 20 to 30 minutes to start to work.  Don't wait till your pain becomes severe before you take your medicine. Try to time your medicine so that you can take it before starting an activity. This might be before you get dressed, go for a walk, or sit down for dinner.  Constipation is a common side effect of some pain medicines. Call your healthcare provider before taking any medicines such as laxatives or stool softeners to help ease constipation. Also ask if you should skip any foods. Drinking lots of fluids and eating foods such as fruits and vegetables that are high in fiber can also help. Remember, don't take laxatives unless your surgeon has prescribed them.  Drinking alcohol and taking pain medicine can cause dizziness and slow your breathing. It can even be deadly. Don't drink alcohol while taking pain medicine.  Pain medicine can make you react more slowly to things. Don't drive or run machinery while taking pain medicine.  Your healthcare provider may tell you to take acetaminophen to help ease your pain. Ask them how much you're supposed to take each day. Acetaminophen or other pain relievers may interact with your prescription medicines or other over-the-counter (OTC) medicines. Some  prescription medicines have acetaminophen and other ingredients in them. Using both prescription and OTC acetaminophen for pain can cause you to accidentally overdose. Read the labels on your OTC medicines with care. This will help you to clearly know the list of ingredients, how much to take, and any warnings. It may also help you not take too much acetaminophen. If you have questions or don't understand the information, ask your pharmacist or healthcare provider to explain it to you before you take the OTC medicine.   Managing nausea  Some people have an upset stomach (nausea) after surgery. This is often because of anesthesia, pain, or pain medicine, less movement of food in the stomach, or the stress of surgery. These tips will help you handle nausea and eat healthy foods as you get better. If you were on a special food plan before surgery, ask your healthcare provider if you should follow it while you get better. Check with your provider on how your eating should progress. It may depend on the surgery you had. These general tips may help:   Don't push yourself to eat. Your body will tell you when to eat and how much.  Start off with clear liquids and soup. They're easier to digest.  Next try semi-solid foods as you feel ready. These include mashed potatoes, applesauce, and gelatin.  Slowly move to solid foods. Don’t eat fatty, rich, or spicy foods at first.  Don't force yourself to have 3 large meals a day. Instead eat smaller amounts more often.  Take pain medicines with a small amount of solid food, such as crackers or toast. This helps prevent nausea.  When to call your healthcare provider  Call your healthcare provider right away if you have any of these:   You still have too much pain, or the pain gets worse, after taking the medicine. The medicine may not be strong enough. Or there may be a complication from the surgery.  You feel too sleepy, dizzy, or groggy. The medicine may be too strong.  Side effects  such as nausea or vomiting. Your healthcare provider may advise taking other medicines to .  Skin changes such as rash, itching, or hives. This may mean you have an allergic reaction. Your provider may advise taking other medicines.  The incision looks different (for instance, part of it opens up).  Bleeding or fluid leaking from the incision site, and weren't told to expect that.  Fever of 100.4°F (38°C) or higher, or as directed by your provider.  Call 911  Call 911 right away if you have:   Trouble breathing  Facial swelling    If you have obstructive sleep apnea   You were given anesthesia medicine during surgery to keep you comfortable and free of pain. After surgery, you may have more apnea spells because of this medicine and other medicines you were given. The spells may last longer than normal.    At home:  Keep using the continuous positive airway pressure (CPAP) device when you sleep. Unless your healthcare provider tells you not to, use it when you sleep, day or night. CPAP is a common device used to treat obstructive sleep apnea.  Talk with your provider before taking any pain medicine, muscle relaxants, or sedatives. Your provider will tell you about the possible dangers of taking these medicines.  Contact your provider if your sleeping changes a lot even when taking medicines as directed.  [employer] last reviewed this educational content on 10/1/2021  © 9477-8018 [employer]. All rights reserved. This information is not intended as a substitute for professional medical care. Always follow your healthcare professional's instructions.

## 2025-03-03 ENCOUNTER — ANESTHESIA EVENT (OUTPATIENT)
Dept: SURGERY | Facility: HOSPITAL | Age: 35
End: 2025-03-03
Payer: COMMERCIAL

## 2025-03-03 ENCOUNTER — APPOINTMENT (OUTPATIENT)
Dept: GENERAL RADIOLOGY | Facility: HOSPITAL | Age: 35
End: 2025-03-03
Attending: UROLOGY
Payer: COMMERCIAL

## 2025-03-03 ENCOUNTER — ANESTHESIA (OUTPATIENT)
Dept: SURGERY | Facility: HOSPITAL | Age: 35
End: 2025-03-03
Payer: COMMERCIAL

## 2025-03-03 ENCOUNTER — HOSPITAL ENCOUNTER (OUTPATIENT)
Facility: HOSPITAL | Age: 35
Setting detail: HOSPITAL OUTPATIENT SURGERY
Discharge: HOME | End: 2025-03-03
Attending: UROLOGY | Admitting: UROLOGY
Payer: COMMERCIAL

## 2025-03-03 VITALS
OXYGEN SATURATION: 95 % | TEMPERATURE: 98 F | SYSTOLIC BLOOD PRESSURE: 124 MMHG | HEART RATE: 76 BPM | DIASTOLIC BLOOD PRESSURE: 72 MMHG | HEIGHT: 73 IN | BODY MASS INDEX: 23.59 KG/M2 | RESPIRATION RATE: 15 BRPM | WEIGHT: 178 LBS

## 2025-03-03 DIAGNOSIS — N13.30 HYDRONEPHROSIS OF LEFT KIDNEY: ICD-10-CM

## 2025-03-03 DIAGNOSIS — N20.0 RECURRENT KIDNEY STONES: Primary | ICD-10-CM

## 2025-03-03 PROCEDURE — 0T778DZ DILATION OF LEFT URETER WITH INTRALUMINAL DEVICE, VIA NATURAL OR ARTIFICIAL OPENING ENDOSCOPIC: ICD-10-PCS | Performed by: UROLOGY

## 2025-03-03 PROCEDURE — 0TC18ZZ EXTIRPATION OF MATTER FROM LEFT KIDNEY, VIA NATURAL OR ARTIFICIAL OPENING ENDOSCOPIC: ICD-10-PCS | Performed by: UROLOGY

## 2025-03-03 PROCEDURE — BT1F1ZZ FLUOROSCOPY OF LEFT KIDNEY, URETER AND BLADDER USING LOW OSMOLAR CONTRAST: ICD-10-PCS | Performed by: UROLOGY

## 2025-03-03 DEVICE — URETERAL STENT
Type: IMPLANTABLE DEVICE | Site: URETER | Status: FUNCTIONAL
Brand: ASCERTA™

## 2025-03-03 RX ORDER — MORPHINE SULFATE 10 MG/ML
6 INJECTION, SOLUTION INTRAMUSCULAR; INTRAVENOUS EVERY 10 MIN PRN
Status: DISCONTINUED | OUTPATIENT
Start: 2025-03-03 | End: 2025-03-03

## 2025-03-03 RX ORDER — FENTANYL CITRATE 50 UG/ML
INJECTION, SOLUTION INTRAMUSCULAR; INTRAVENOUS AS NEEDED
Status: DISCONTINUED | OUTPATIENT
Start: 2025-03-03 | End: 2025-03-03 | Stop reason: SURG

## 2025-03-03 RX ORDER — MORPHINE SULFATE 4 MG/ML
2 INJECTION, SOLUTION INTRAMUSCULAR; INTRAVENOUS EVERY 10 MIN PRN
Status: DISCONTINUED | OUTPATIENT
Start: 2025-03-03 | End: 2025-03-03

## 2025-03-03 RX ORDER — HYDROMORPHONE HYDROCHLORIDE 1 MG/ML
0.6 INJECTION, SOLUTION INTRAMUSCULAR; INTRAVENOUS; SUBCUTANEOUS EVERY 5 MIN PRN
Status: DISCONTINUED | OUTPATIENT
Start: 2025-03-03 | End: 2025-03-03

## 2025-03-03 RX ORDER — ACETAMINOPHEN 500 MG
1000 TABLET ORAL ONCE
Status: DISCONTINUED | OUTPATIENT
Start: 2025-03-03 | End: 2025-03-03 | Stop reason: HOSPADM

## 2025-03-03 RX ORDER — ONDANSETRON 2 MG/ML
INJECTION INTRAMUSCULAR; INTRAVENOUS AS NEEDED
Status: DISCONTINUED | OUTPATIENT
Start: 2025-03-03 | End: 2025-03-03 | Stop reason: SURG

## 2025-03-03 RX ORDER — SODIUM CHLORIDE, SODIUM LACTATE, POTASSIUM CHLORIDE, CALCIUM CHLORIDE 600; 310; 30; 20 MG/100ML; MG/100ML; MG/100ML; MG/100ML
INJECTION, SOLUTION INTRAVENOUS CONTINUOUS
Status: DISCONTINUED | OUTPATIENT
Start: 2025-03-03 | End: 2025-03-03

## 2025-03-03 RX ORDER — MORPHINE SULFATE 4 MG/ML
4 INJECTION, SOLUTION INTRAMUSCULAR; INTRAVENOUS EVERY 10 MIN PRN
Status: DISCONTINUED | OUTPATIENT
Start: 2025-03-03 | End: 2025-03-03

## 2025-03-03 RX ORDER — HYDROMORPHONE HYDROCHLORIDE 1 MG/ML
0.2 INJECTION, SOLUTION INTRAMUSCULAR; INTRAVENOUS; SUBCUTANEOUS EVERY 5 MIN PRN
Status: DISCONTINUED | OUTPATIENT
Start: 2025-03-03 | End: 2025-03-03

## 2025-03-03 RX ORDER — DIAZEPAM 10 MG/1
10 TABLET ORAL EVERY 12 HOURS PRN
Qty: 5 TABLET | Refills: 0 | Status: SHIPPED | OUTPATIENT
Start: 2025-03-03

## 2025-03-03 RX ORDER — IOPAMIDOL 612 MG/ML
INJECTION, SOLUTION INTRATHECAL AS NEEDED
Status: DISCONTINUED | OUTPATIENT
Start: 2025-03-03 | End: 2025-03-03 | Stop reason: HOSPADM

## 2025-03-03 RX ORDER — HYDROMORPHONE HYDROCHLORIDE 1 MG/ML
0.4 INJECTION, SOLUTION INTRAMUSCULAR; INTRAVENOUS; SUBCUTANEOUS EVERY 5 MIN PRN
Status: DISCONTINUED | OUTPATIENT
Start: 2025-03-03 | End: 2025-03-03

## 2025-03-03 RX ORDER — LIDOCAINE HYDROCHLORIDE 10 MG/ML
INJECTION, SOLUTION EPIDURAL; INFILTRATION; INTRACAUDAL; PERINEURAL AS NEEDED
Status: DISCONTINUED | OUTPATIENT
Start: 2025-03-03 | End: 2025-03-03 | Stop reason: SURG

## 2025-03-03 RX ORDER — PROPOFOL 10 MG/ML
INJECTION, EMULSION INTRAVENOUS AS NEEDED
Status: DISCONTINUED | OUTPATIENT
Start: 2025-03-03 | End: 2025-03-03 | Stop reason: SURG

## 2025-03-03 RX ORDER — PHENAZOPYRIDINE HYDROCHLORIDE 100 MG/1
100 TABLET, FILM COATED ORAL 3 TIMES DAILY PRN
Qty: 15 TABLET | Refills: 0 | Status: SHIPPED | OUTPATIENT
Start: 2025-03-03

## 2025-03-03 RX ORDER — NALOXONE HYDROCHLORIDE 0.4 MG/ML
0.08 INJECTION, SOLUTION INTRAMUSCULAR; INTRAVENOUS; SUBCUTANEOUS AS NEEDED
Status: DISCONTINUED | OUTPATIENT
Start: 2025-03-03 | End: 2025-03-03

## 2025-03-03 RX ORDER — DEXAMETHASONE SODIUM PHOSPHATE 4 MG/ML
INJECTION, SOLUTION INTRA-ARTICULAR; INTRALESIONAL; INTRAMUSCULAR; INTRAVENOUS; SOFT TISSUE AS NEEDED
Status: DISCONTINUED | OUTPATIENT
Start: 2025-03-03 | End: 2025-03-03 | Stop reason: SURG

## 2025-03-03 RX ADMIN — FENTANYL CITRATE 50 MCG: 50 INJECTION, SOLUTION INTRAMUSCULAR; INTRAVENOUS at 13:11:00

## 2025-03-03 RX ADMIN — DEXAMETHASONE SODIUM PHOSPHATE 4 MG: 4 INJECTION, SOLUTION INTRA-ARTICULAR; INTRALESIONAL; INTRAMUSCULAR; INTRAVENOUS; SOFT TISSUE at 13:11:00

## 2025-03-03 RX ADMIN — ONDANSETRON 4 MG: 2 INJECTION INTRAMUSCULAR; INTRAVENOUS at 13:11:00

## 2025-03-03 RX ADMIN — FENTANYL CITRATE 50 MCG: 50 INJECTION, SOLUTION INTRAMUSCULAR; INTRAVENOUS at 13:49:00

## 2025-03-03 RX ADMIN — LIDOCAINE HYDROCHLORIDE 50 MG: 10 INJECTION, SOLUTION EPIDURAL; INFILTRATION; INTRACAUDAL; PERINEURAL at 13:12:00

## 2025-03-03 RX ADMIN — PROPOFOL 250 MG: 10 INJECTION, EMULSION INTRAVENOUS at 13:11:00

## 2025-03-03 NOTE — H&P
Pre-op Diagnosis: Ureteropelvic junction obstruction, congenital, renal colic left side    The above referenced H&P by Richard Hartman NP on 2/26/25 was reviewed by Charlene Johnson MD on 3/3/2025, the patient was examined and no significant changes have occurred in the patient's condition since the H&P was performed.  I discussed with the patient and/or legal representative the potential benefits, risks and side effects of this procedure; the likelihood of the patient achieving goals; and potential problems that might occur during recuperation.  I discussed reasonable alternatives to the procedure, including risks, benefits and side effects related to the alternatives and risks related to not receiving this procedure.  We will proceed with procedure as planned.    MARCELA Johnson MD   3/3/25

## 2025-03-03 NOTE — ANESTHESIA PREPROCEDURE EVALUATION
Anesthesia PreOp Note    HPI:     Austin Mcfarland is a 34 year old male who presents for preoperative consultation requested by: Charlene Johnson MD    Date of Surgery: 3/3/2025    Procedure(s):  Cystoscopy, left ureteroscopy, left ureteral stone extraction, left ureteral stent placement, left retrograde pyelogram, holmium laser lithotripsy  Cystoscopy stent insertion  Cystoscopy retrograde  Laser holmium lithotripsy  Indication: Ureteropelvic junction obstruction, congenital, renal colic left side    Relevant Problems   No relevant active problems       NPO:  Last Liquid Consumption Date: 03/03/25  Last Liquid Consumption Time: 2230  Last Solid Consumption Date: 03/03/25  Last Solid Consumption Time: 2230  Last Liquid Consumption Date: 03/03/25          History Review:  Patient Active Problem List    Diagnosis Date Noted    Hydronephrosis of left kidney 05/27/2022    Recurrent kidney stones 12/06/2016    Ureteropelvic junction obstruct, congenital 07/15/2010       Past Medical History:    Anxiety    Calculus of kidney    Congenital obstruction of ureteropelvic junction    Depression    Renal disorder    congenital left ureter stricture        Past Surgical History:   Procedure Laterality Date    Cystoscopy,+ureteroscopy Left 7/31/10    s/p left rpg, left urs, left renoscopy, cysto, with stent placement 7-31-10 at [facility], Dr John Sheffield    Cystoscopy,+ureteroscopy Left 12/3/10    L URS, Dr. Sheffield    Cystoscopy,+ureteroscopy Left     Cysto, URS, RPG, stent Bladder Stone removal-Dr Wilson    Cystoscopy,+ureteroscopy Left 3/19/15    no stent placed, [facility] DIONY    Cystoscopy,insert ureteral stent  4/22/2014    Procedure: LITHOTRIPSY WITH CYSTOSCOPY, STENT PLACEMENT;  Surgeon: Dex Jane DO;  Location: [facility]    Cystoscopy,remv calculus,simple  12/27/07    Performed by JOHN SHEFFIELD at [facility]    Cystoscopy,remv calculus,simple  5/23/08    Performed by JOHN SHEFFIELD at [facility]  [place], [facility]    Cystourethroscopy Left 12/27/10    cysto stent removal- Dr. Calderon    Cystourethroscopy Left 12/19/14    Cysto Stent Removal-Dr Jane    Cystourethroscopy,ureter catheter  3/10/08    Performed by DEBORAH CALDERON at [facility], [facility]    Cystourethroscopy,ureter catheter  4/22/2014    Procedure: LITHOTRIPSY WITH CYSTOSCOPY, STENT PLACEMENT;  Surgeon: Dex Jane DO;  Location: [facility]    Fragmenting of kidney stone  9/25/07    Performed by DEBORAH CALDERON at [facility], [facility]    Fragmenting of kidney stone  9/25/07    Performed by DEBORAH CALDERON at [facility], [facility]    Fragmenting of kidney stone  4/22/2014    Procedure: LITHOTRIPSY WITH CYSTOSCOPY, STENT PLACEMENT;  Surgeon: Dex Jane DO;  Location: [facility]    Ir nephhrostomy tube  2007    Cysto stent removal, nephrostomy tube placement    Laparoscopy, surgical; pyeloplasty  Oct 11, 2007    Left UPJ repair    Lithotripsy Left June 21, 2007    Left ESWL    Other surgical history  10/8/16    Cysto, Lt RPG, Lt URS & Nephroscopy, Stone Manipulation, Stone Basket & Removal, Stent Placement-Dr. Calderon     Other surgical history      left pcnl cdh    Other surgical history      stent placement cdh    Other surgical history  12/5/16    cysto stent removal dr calderon    Other surgical history  12/11/2020    Cysto/stent removal Dr. Su    Other surgical history  06/25/2021    CYSTOSCOPY, LEFT DIAGNOSTIC URETEROSCOPY, LEFT RETROGRADE PYELOGRAM, LEFT URETERAL STENT PLACEMENT,    Other surgical history  06/11/2021    CYSTOSCOPY,LEFT URETEROSCOPY,, RETROGRADE PYELOGRAM, LEFT STENT INSERTION    Other surgical history  01/21/2022    Cysto, Lt URS, RPHG, LL, Stone Extraction, Lt Stent Placement - Dr. Steel    Other surgical history  02/03/2022    Cysto Stent Removal- Dr. Steel    Renal endoscopy  3/10/08    Performed by DEBORAH CALDERON at [facility], [facility]    Special service or report  May  9, 2007    Cysto, stone extraction    Special service or report  Sept. 20, 2006    Neph tube insertion with endopyelotomy    Special service or report  Oct 21, 2007    Larger ureteral stent placed    Urology surgery procedure unlisted  5/23/08    Performed by DEBORAH KOHLI at [facility]    X-ray antegrade pyelogram tube  3/10/08    Performed by DEBORAH KOHLI at [facility]    X-ray retrograde pyelogram  3/10/08    Performed by DEBORAH KOHLI at [facility]    X-ray retrograde pyelogram  5/23/08    Performed by DEBORAH KOHLI at [facility]    X-ray retrograde pyelogram  4/22/2014    Procedure: LITHOTRIPSY WITH CYSTOSCOPY, STENT PLACEMENT;  Surgeon: Dex Jane DO;  Location: [facility]       Prescriptions Prior to Admission[1]  Current Medications and Prescriptions Ordered in Epic[2]    Allergies[3]    History reviewed. No pertinent family history.  Social History     Socioeconomic History    Marital status: Single   Tobacco Use    Smoking status: Never    Smokeless tobacco: Never   Vaping Use    Vaping status: Never Used   Substance and Sexual Activity    Alcohol use: Yes     Comment: ocassionally    Drug use: Yes     Frequency: 7.0 times per week     Types: Cannabis     Comment: medical       Available pre-op labs reviewed.  Lab Results   Component Value Date    WBC 4.9 02/24/2025    RBC 4.88 02/24/2025    HGB 15.0 02/24/2025    HCT 43.2 02/24/2025    MCV 88.5 02/24/2025    MCH 30.7 02/24/2025    MCHC 34.7 02/24/2025    RDW 12.4 02/24/2025    .0 02/24/2025     Lab Results   Component Value Date     02/24/2025    K 4.2 02/24/2025     02/24/2025    CO2 31.0 02/24/2025    BUN 9 02/24/2025    CREATSERUM 1.08 02/24/2025    GLU 92 02/24/2025    CA 9.4 02/24/2025          Vital Signs:  Body mass index is 23.48 kg/m².   height is 1.854 m (6' 1\") and weight is 80.7 kg (178 lb). His oral temperature is 97.1 °F (36.2 °C). His  blood pressure is 116/78 and his pulse is 79. His respiration is 18 and oxygen saturation is 95%.   Vitals:    02/27/25 1205 03/03/25 1147   BP:  116/78   Pulse:  79   Resp:  18   Temp:  97.1 °F (36.2 °C)   TempSrc:  Oral   SpO2:  95%   Weight: 77.1 kg (170 lb) 80.7 kg (178 lb)   Height: 1.854 m (6' 1\") 1.854 m (6' 1\")        Anesthesia Evaluation     Patient summary reviewed and Nursing notes reviewed    No history of anesthetic complications   Airway   Mallampati: I  TM distance: >3 FB  Neck ROM: full  Dental      Pulmonary - normal exam   Cardiovascular - negative ROS and normal exam    Neuro/Psych    (+)  anxiety/panic attacks,  depression      GI/Hepatic/Renal - negative ROS     Endo/Other - negative ROS   Abdominal                  Anesthesia Plan:   ASA:  2  Plan:   General  Informed Consent Plan and Risks Discussed With:  Patient      I have informed Austin H W[place]ia[place] and/or legal guardian or family member of the nature of the anesthetic plan, benefits, risks including possible dental damage if relevant, major complications, and any alternative forms of anesthetic management.   All of the patient's questions were answered to the best of my ability. The patient desires the anesthetic management as planned.  Zaina Corona MD  3/3/2025 12:23 PM  Present on Admission:  **None**           [1]   Medications Prior to Admission   Medication Sig Dispense Refill Last Dose/Taking    HYDROcodone-acetaminophen 5-325 MG Oral Tab Take 1-2 tablets by mouth every 6 (six) hours as needed for Pain. 12 tablet 0 3/3/2025 at  8:30 AM    Naloxone HCl 4 MG/0.1ML Nasal Liquid 4 mg by Nasal route as needed. If patient remains unresponsive, repeat dose in other nostril 2-5 minutes after first dose. 1 kit 0 More than a month    chlorhexidine gluconate 0.12 % Mouth/Throat Solution Use as directed 15 mL in the mouth or throat 2 (two) times daily. (Patient not taking: Reported on 2/24/2025) 473 mL 0 More than a month    docusate sodium 100  MG Oral Cap Take 100 mg by mouth 2 (two) times daily. (Patient not taking: Reported on 2/24/2025) 60 capsule 0 More than a month    HYDROcodone-acetaminophen  MG Oral Tab Take 1-2 tablets by mouth every 4 (four) hours as needed for Pain. (Patient not taking: Reported on 2/24/2025) 24 tablet 0 More than a month   [2]   Current Facility-Administered Medications Ordered in Epic   Medication Dose Route Frequency Provider Last Rate Last Admin    lactated ringers infusion   Intravenous Continuous Charlene Johnson MD 20 mL/hr at 03/03/25 1158 New Bag at 03/03/25 1158    acetaminophen (Tylenol Extra Strength) tab 1,000 mg  1,000 mg Oral Once Charlene Johnson MD        ceFAZolin (Ancef) 2g in 10mL IV syringe premix  2 g Intravenous Once Charlene Johnson MD         No current [facility]-ordered outpatient medications on file.   [3]   Allergies  Allergen Reactions    Morphine RASH    Dilaudid [Hydromorphone] ITCHING     Pt states can tolerate with benadryl     Fentanyl ITCHING     Pt states can tolerate with benadryl

## 2025-03-03 NOTE — OPERATIVE REPORT
[facility]   OPERATIVE REPORT    Austin Mcfarland Patient Status:  Hospital Outpatient Surgery    1990 MRN [id number]   Location [facility] POST ANESTHESIA CARE UNIT Attending Charlene Johnson MD   Hosp Day # 0 PCP FRANK LOERA        DATE of OPERATION: 3/3/2025  SURGEON: Charlene Johnson MD    PREOPERATIVE DIAGNOSIS: left flank pain, left hydronephrosis and history of UPJ obstruction, recurrent kidney stones     POSTOPERATIVE DIAGNOSIS: left flank pain, left hydronephrosis and history of UPJ obstruction, recurrent kidney stones     PROCEDURE PERFORMED:    1. Cystoscopy  2. Left Retrograde Pyelogram  3. Placement of Left Ureteral Stent   4. Left Ureteroscopy, Stone laser lithotripsy    ANESTHESIA:  General.  EBL: 5 ml   COMPLICATIONS: None.   INDWELLING CATHETER: 6 Fr x 28 cm Double J ureter stent with attached string.  SPECIMEN: None.      INDICATIONS: The patient is a 34 year old male, who presented with intermittent right flank pain recently.  He has complicated history left ureter surgery and reconstruction with buccal mucosal graft. His left renal collecting system has been chronically dilated even thought the ureter remains patent.  A recent CT ab/pel showed a non-obstructing left lower pole kidney stone.  All risks, benefits, and potential adverse event of the procedure were discussed and a consent form was signed.     TECHNIQUE:  The patient was brought back to the operating room and placed in supine position.  A general anesthesia was induced and a time-out was reviewed.  Preoperative antibiotics were administered.  The patient was prepped and draped in the dorsal lithotomy position.  Sequential compression devices were in place on the lower legs.     The cystoscope was passed into the bladder and the bladder was evaluated in a systematic manner. There were no lesions, diverticula, or masses noted. The ureteral orrifices were in normal position.  The cystoscope was used to guide an  open ended 5 Fr ureter catheter into the left ureteral orifice and water soluble contrast was injected in a retrograde manner.  There was no dilation of ureter and the lumen appeared to be completely patent at the previous reconstruction site (with typical ballooning of the interposed graft); the kidney calyces were observed to be very dilated and hydronephrotic with a small lower pole filling defect, most likely signifying the stone.  A 0.038 guide wire was then advanced via the open end catheter into the  ureter and up to the renal collecting system.  A second safety guide wire was placed as well.  The flexible ureteroscope was advanced over the working wire into the ureter and easily passed the previous repair site; the stone was visualized in the lower pole of the kidney. The stone was fragmented into small pieces with the 200 laser fiber.  The stone fragments were then left in situ for spontaneous passage.  A 6-Maldivian x 28 cm double J stent was then passed over the guide wire up into the collecting system.  The stent was in good position which was confirmed via live fluoro x-ray.  There were no complications during this procedure.  The bladder was emptied of urine at the end of the procedure.     The patient was awakened and taken to the recovery area in stable condition. The patient will be discharged to home and will be advised to remove the stent on this Wednesday by pulling the string.     Charlene Johnson MD  3/3/2025

## 2025-03-03 NOTE — ANESTHESIA PROCEDURE NOTES
Airway  Date/Time: 3/3/2025 1:13 PM  Urgency: Elective    Airway not difficult    General Information and Staff    Patient location during procedure: OR  Anesthesiologist: Zaina Corona MD  Performed: anesthesiologist   Performed by: Zaina Corona MD  Authorized by: Zaina Corona MD      Indications and Patient Condition  Indications for airway management: anesthesia  Sedation level: deep  Preoxygenated: yes  Patient position: sniffing  Mask difficulty assessment: 1 - vent by mask    Final Airway Details  Final airway type: supraglottic airway      Successful airway: classic  Size 4       Number of attempts at approach: 1

## 2025-03-03 NOTE — ANESTHESIA POSTPROCEDURE EVALUATION
Patient: Austin Mcfarland    Procedure Summary       Date: 03/03/25 Room / Location: [facility] MAIN OR  / [facility] MAIN OR    Anesthesia Start: 1308 Anesthesia Stop: 1356    Procedures:       Cystoscopy, left ureteroscopy, left ureteral stent placement, left retrograde pyelogram, holmium laser lithotripsy (Left)      Cystoscopy stent insertion (Left)      Cystoscopy retrograde (Left)      Laser holmium lithotripsy (Left) Diagnosis: (Ureteropelvic junction obstruction, congenital, renal colic left side)    Surgeons: Charlene Johnson MD Anesthesiologist: Zaina Corona MD    Anesthesia Type: general ASA Status: 2            Anesthesia Type: general    Vitals Value Taken Time   /66 03/03/25 1354   Temp  03/03/25 1357   Pulse 69 03/03/25 1356   Resp 10 03/03/25 1356   SpO2 95 % 03/03/25 1356   Vitals shown include unfiled device data.    [facility] AN Post Evaluation:   Patient Evaluated in PACU  Patient Participation: complete - patient participated  Level of Consciousness: awake  Pain Management: adequate  Airway Patency:patent  Yes    Nausea/Vomiting: none  Cardiovascular Status: acceptable  Respiratory Status: acceptable  Postoperative Hydration acceptable      Zaina Corona MD  3/3/2025 1:57 PM

## 2025-03-13 ENCOUNTER — APPOINTMENT (OUTPATIENT)
Dept: CT IMAGING | Facility: HOSPITAL | Age: 35
End: 2025-03-13
Attending: EMERGENCY MEDICINE
Payer: COMMERCIAL

## 2025-03-13 ENCOUNTER — HOSPITAL ENCOUNTER (INPATIENT)
Facility: HOSPITAL | Age: 35
LOS: 1 days | Discharge: HOME | End: 2025-03-14
Attending: EMERGENCY MEDICINE | Admitting: INTERNAL MEDICINE
Payer: COMMERCIAL

## 2025-03-13 DIAGNOSIS — R10.9 FLANK PAIN: Primary | ICD-10-CM

## 2025-03-13 LAB
ALBUMIN SERPL-MCNC: 5 G/DL (ref 3.2–4.8)
ALBUMIN/GLOB SERPL: 1.9 {RATIO} (ref 1–2)
ALP LIVER SERPL-CCNC: 72 U/L
ALT SERPL-CCNC: 17 U/L
ANION GAP SERPL CALC-SCNC: 8 MMOL/L (ref 0–18)
AST SERPL-CCNC: 20 U/L (ref ?–34)
BASOPHILS # BLD AUTO: 0.03 X10(3) UL (ref 0–0.2)
BASOPHILS NFR BLD AUTO: 0.7 %
BILIRUB SERPL-MCNC: 0.7 MG/DL (ref 0.3–1.2)
BILIRUB UR QL STRIP.AUTO: NEGATIVE
BUN BLD-MCNC: 13 MG/DL (ref 9–23)
CALCIUM BLD-MCNC: 9.6 MG/DL (ref 8.7–10.6)
CHLORIDE SERPL-SCNC: 106 MMOL/L (ref 98–112)
CO2 SERPL-SCNC: 27 MMOL/L (ref 21–32)
COLOR UR AUTO: YELLOW
CREAT BLD-MCNC: 0.99 MG/DL
EGFRCR SERPLBLD CKD-EPI 2021: 103 ML/MIN/1.73M2 (ref 60–?)
EOSINOPHIL # BLD AUTO: 0.01 X10(3) UL (ref 0–0.7)
EOSINOPHIL NFR BLD AUTO: 0.2 %
ERYTHROCYTE [DISTWIDTH] IN BLOOD BY AUTOMATED COUNT: 12.3 %
GLOBULIN PLAS-MCNC: 2.7 G/DL (ref 2–3.5)
GLUCOSE BLD-MCNC: 103 MG/DL (ref 70–99)
GLUCOSE UR STRIP.AUTO-MCNC: NORMAL MG/DL
HCT VFR BLD AUTO: 43.9 %
HGB BLD-MCNC: 15.4 G/DL
IMM GRANULOCYTES # BLD AUTO: 0.01 X10(3) UL (ref 0–1)
IMM GRANULOCYTES NFR BLD: 0.2 %
KETONES UR STRIP.AUTO-MCNC: NEGATIVE MG/DL
LEUKOCYTE ESTERASE UR QL STRIP.AUTO: NEGATIVE
LIPASE SERPL-CCNC: 27 U/L (ref 12–53)
LYMPHOCYTES # BLD AUTO: 1.25 X10(3) UL (ref 1–4)
LYMPHOCYTES NFR BLD AUTO: 29.2 %
MCH RBC QN AUTO: 31.5 PG (ref 26–34)
MCHC RBC AUTO-ENTMCNC: 35.1 G/DL (ref 31–37)
MCV RBC AUTO: 89.8 FL
MONOCYTES # BLD AUTO: 0.27 X10(3) UL (ref 0.1–1)
MONOCYTES NFR BLD AUTO: 6.3 %
NEUTROPHILS # BLD AUTO: 2.71 X10 (3) UL (ref 1.5–7.7)
NEUTROPHILS # BLD AUTO: 2.71 X10(3) UL (ref 1.5–7.7)
NEUTROPHILS NFR BLD AUTO: 63.4 %
NITRITE UR QL STRIP.AUTO: NEGATIVE
OSMOLALITY SERPL CALC.SUM OF ELEC: 292 MOSM/KG (ref 275–295)
PH UR STRIP.AUTO: 6.5 [PH] (ref 5–8)
PLATELET # BLD AUTO: 198 10(3)UL (ref 150–450)
POTASSIUM SERPL-SCNC: 4.3 MMOL/L (ref 3.5–5.1)
PROT SERPL-MCNC: 7.7 G/DL (ref 5.7–8.2)
RBC # BLD AUTO: 4.89 X10(6)UL
SODIUM SERPL-SCNC: 141 MMOL/L (ref 136–145)
SP GR UR STRIP.AUTO: 1.02 (ref 1–1.03)
UROBILINOGEN UR STRIP.AUTO-MCNC: NORMAL MG/DL
WBC # BLD AUTO: 4.3 X10(3) UL (ref 4–11)

## 2025-03-13 PROCEDURE — 96375 TX/PRO/DX INJ NEW DRUG ADDON: CPT

## 2025-03-13 PROCEDURE — 80053 COMPREHEN METABOLIC PANEL: CPT

## 2025-03-13 PROCEDURE — 83690 ASSAY OF LIPASE: CPT

## 2025-03-13 PROCEDURE — 99285 EMERGENCY DEPT VISIT HI MDM: CPT

## 2025-03-13 PROCEDURE — 85025 COMPLETE CBC W/AUTO DIFF WBC: CPT | Performed by: EMERGENCY MEDICINE

## 2025-03-13 PROCEDURE — 74176 CT ABD & PELVIS W/O CONTRAST: CPT | Performed by: EMERGENCY MEDICINE

## 2025-03-13 PROCEDURE — 81001 URINALYSIS AUTO W/SCOPE: CPT | Performed by: EMERGENCY MEDICINE

## 2025-03-13 PROCEDURE — 83690 ASSAY OF LIPASE: CPT | Performed by: EMERGENCY MEDICINE

## 2025-03-13 PROCEDURE — 96361 HYDRATE IV INFUSION ADD-ON: CPT

## 2025-03-13 PROCEDURE — 85025 COMPLETE CBC W/AUTO DIFF WBC: CPT

## 2025-03-13 PROCEDURE — 87086 URINE CULTURE/COLONY COUNT: CPT | Performed by: UROLOGY

## 2025-03-13 PROCEDURE — 80053 COMPREHEN METABOLIC PANEL: CPT | Performed by: EMERGENCY MEDICINE

## 2025-03-13 PROCEDURE — 96376 TX/PRO/DX INJ SAME DRUG ADON: CPT

## 2025-03-13 PROCEDURE — 96374 THER/PROPH/DIAG INJ IV PUSH: CPT

## 2025-03-13 RX ORDER — HYDROMORPHONE HYDROCHLORIDE 1 MG/ML
1 INJECTION, SOLUTION INTRAMUSCULAR; INTRAVENOUS; SUBCUTANEOUS EVERY 30 MIN PRN
Status: DISCONTINUED | OUTPATIENT
Start: 2025-03-13 | End: 2025-03-13 | Stop reason: DRUGHIGH

## 2025-03-13 RX ORDER — POLYETHYLENE GLYCOL 3350 17 G/17G
17 POWDER, FOR SOLUTION ORAL DAILY PRN
Status: DISCONTINUED | OUTPATIENT
Start: 2025-03-13 | End: 2025-03-14

## 2025-03-13 RX ORDER — SODIUM PHOSPHATE, DIBASIC AND SODIUM PHOSPHATE, MONOBASIC 7; 19 G/230ML; G/230ML
1 ENEMA RECTAL ONCE AS NEEDED
Status: DISCONTINUED | OUTPATIENT
Start: 2025-03-13 | End: 2025-03-14

## 2025-03-13 RX ORDER — BISACODYL 10 MG
10 SUPPOSITORY, RECTAL RECTAL
Status: DISCONTINUED | OUTPATIENT
Start: 2025-03-13 | End: 2025-03-14

## 2025-03-13 RX ORDER — PROCHLORPERAZINE EDISYLATE 5 MG/ML
5 INJECTION INTRAMUSCULAR; INTRAVENOUS EVERY 8 HOURS PRN
Status: DISCONTINUED | OUTPATIENT
Start: 2025-03-13 | End: 2025-03-14

## 2025-03-13 RX ORDER — ONDANSETRON 2 MG/ML
4 INJECTION INTRAMUSCULAR; INTRAVENOUS EVERY 6 HOURS PRN
Status: DISCONTINUED | OUTPATIENT
Start: 2025-03-13 | End: 2025-03-14

## 2025-03-13 RX ORDER — HYDROMORPHONE HYDROCHLORIDE 1 MG/ML
1 INJECTION, SOLUTION INTRAMUSCULAR; INTRAVENOUS; SUBCUTANEOUS ONCE
Status: COMPLETED | OUTPATIENT
Start: 2025-03-13 | End: 2025-03-13

## 2025-03-13 RX ORDER — KETOROLAC TROMETHAMINE 30 MG/ML
30 INJECTION, SOLUTION INTRAMUSCULAR; INTRAVENOUS ONCE
Status: COMPLETED | OUTPATIENT
Start: 2025-03-13 | End: 2025-03-13

## 2025-03-13 RX ORDER — HYDROMORPHONE HYDROCHLORIDE 1 MG/ML
0.8 INJECTION, SOLUTION INTRAMUSCULAR; INTRAVENOUS; SUBCUTANEOUS EVERY 4 HOURS PRN
Status: DISCONTINUED | OUTPATIENT
Start: 2025-03-13 | End: 2025-03-14

## 2025-03-13 RX ORDER — HYDROMORPHONE HYDROCHLORIDE 1 MG/ML
0.2 INJECTION, SOLUTION INTRAMUSCULAR; INTRAVENOUS; SUBCUTANEOUS EVERY 4 HOURS PRN
Status: DISCONTINUED | OUTPATIENT
Start: 2025-03-13 | End: 2025-03-14

## 2025-03-13 RX ORDER — ONDANSETRON 2 MG/ML
4 INJECTION INTRAMUSCULAR; INTRAVENOUS ONCE
Status: COMPLETED | OUTPATIENT
Start: 2025-03-13 | End: 2025-03-13

## 2025-03-13 RX ORDER — SENNOSIDES 8.6 MG
17.2 TABLET ORAL NIGHTLY PRN
Status: DISCONTINUED | OUTPATIENT
Start: 2025-03-13 | End: 2025-03-14

## 2025-03-13 RX ORDER — HYDROMORPHONE HYDROCHLORIDE 1 MG/ML
0.4 INJECTION, SOLUTION INTRAMUSCULAR; INTRAVENOUS; SUBCUTANEOUS EVERY 4 HOURS PRN
Status: DISCONTINUED | OUTPATIENT
Start: 2025-03-13 | End: 2025-03-14

## 2025-03-13 RX ORDER — HYDROCODONE BITARTRATE AND ACETAMINOPHEN 5; 325 MG/1; MG/1
2 TABLET ORAL EVERY 4 HOURS PRN
Status: DISCONTINUED | OUTPATIENT
Start: 2025-03-13 | End: 2025-03-14

## 2025-03-13 RX ORDER — SODIUM CHLORIDE 9 MG/ML
INJECTION, SOLUTION INTRAVENOUS CONTINUOUS
Status: DISCONTINUED | OUTPATIENT
Start: 2025-03-13 | End: 2025-03-14

## 2025-03-13 RX ORDER — ACETAMINOPHEN 500 MG
500 TABLET ORAL EVERY 4 HOURS PRN
Status: DISCONTINUED | OUTPATIENT
Start: 2025-03-13 | End: 2025-03-14

## 2025-03-13 RX ORDER — ACETAMINOPHEN 325 MG/1
650 TABLET ORAL EVERY 4 HOURS PRN
Status: DISCONTINUED | OUTPATIENT
Start: 2025-03-13 | End: 2025-03-14

## 2025-03-13 RX ORDER — HYDROCODONE BITARTRATE AND ACETAMINOPHEN 5; 325 MG/1; MG/1
1 TABLET ORAL EVERY 4 HOURS PRN
Status: DISCONTINUED | OUTPATIENT
Start: 2025-03-13 | End: 2025-03-14

## 2025-03-13 RX ORDER — DIPHENHYDRAMINE HYDROCHLORIDE 50 MG/ML
25 INJECTION, SOLUTION INTRAMUSCULAR; INTRAVENOUS ONCE
Status: COMPLETED | OUTPATIENT
Start: 2025-03-13 | End: 2025-03-13

## 2025-03-13 RX ORDER — KETOROLAC TROMETHAMINE 15 MG/ML
15 INJECTION, SOLUTION INTRAMUSCULAR; INTRAVENOUS EVERY 6 HOURS PRN
Status: DISCONTINUED | OUTPATIENT
Start: 2025-03-13 | End: 2025-03-14

## 2025-03-13 NOTE — ED PROVIDER NOTES
Patient Seen in: [facility] Emergency Department      History     Chief Complaint   Patient presents with    Flank Pain     Stated Complaint: possible kidney stone    Subjective:   HPI      34-year-old male with history of multiple previous kidney stones presents reporting pain in the right flank and abdomen.  He says this does not really feel like a kidney stone to him.  He does report he had stenting performed on that side 2 weeks ago.  Stent was removed 1 week ago.  He reports nausea and vomiting.    Objective:     Past Medical History:    Anxiety    Calculus of kidney    Congenital obstruction of ureteropelvic junction    Depression    Renal disorder    congenital left ureter stricture               Past Surgical History:   Procedure Laterality Date    Cystoscopy,+ureteroscopy Left 7/31/10    s/p left rpg, left urs, left renoscopy, cysto, with stent placement 7-31-10 at [facility], Dr John Sheffield    Cystoscopy,+ureteroscopy Left 12/3/10    L URS, Dr. Sheffield    Cystoscopy,+ureteroscopy Left     Cysto, URS, RPG, stent Bladder Stone removal-Dr Wilson    Cystoscopy,+ureteroscopy Left 3/19/15    no stent placed, [facility] DIONY    Cystoscopy,insert ureteral stent  4/22/2014    Procedure: LITHOTRIPSY WITH CYSTOSCOPY, STENT PLACEMENT;  Surgeon: Dex Jane DO;  Location: [facility]    Cystoscopy,remv calculus,simple  12/27/07    Performed by JOHN SHEFFIELD at [facility]    Cystoscopy,remv calculus,simple  5/23/08    Performed by JOHN SHEFFIELD at [facility]    Cystourethroscopy Left 12/27/10    cysto stent removal- Dr. Sheffield    Cystourethroscopy Left 12/19/14    Cysto Stent Removal-Dr Jane    Cystourethroscopy,ureter catheter  3/10/08    Performed by JOHN SHEFFIELD at [facility]    Cystourethroscopy,ureter catheter  4/22/2014    Procedure: LITHOTRIPSY WITH CYSTOSCOPY, STENT PLACEMENT;  Surgeon: Dex Jane DO;  Location: [facility]     Fragmenting of kidney stone  9/25/07    Performed by DEBORAH CALDERON at [facility]    Fragmenting of kidney stone  9/25/07    Performed by DEBORAH CALDERON at [facility]    Fragmenting of kidney stone  4/22/2014    Procedure: LITHOTRIPSY WITH CYSTOSCOPY, STENT PLACEMENT;  Surgeon: Dex Jane DO;  Location: [facility]    Ir nephhrostomy tube  2007    Cysto stent removal, nephrostomy tube placement    Laparoscopy, surgical; pyeloplasty  Oct 11, 2007    Left UPJ repair    Lithotripsy Left June 21, 2007    Left ESWL    Other surgical history  10/8/16    Cysto, Lt RPG, Lt URS & Nephroscopy, Stone Manipulation, Stone Basket & Removal, Stent Placement-Dr. Calderon     Other surgical history      left pcnl cdh    Other surgical history      stent placement cdh    Other surgical history  12/5/16    cysto stent removal dr calderon    Other surgical history  12/11/2020    Cysto/stent removal Dr. Su    Other surgical history  06/25/2021    CYSTOSCOPY, LEFT DIAGNOSTIC URETEROSCOPY, LEFT RETROGRADE PYELOGRAM, LEFT URETERAL STENT PLACEMENT,    Other surgical history  06/11/2021    CYSTOSCOPY,LEFT URETEROSCOPY,, RETROGRADE PYELOGRAM, LEFT STENT INSERTION    Other surgical history  01/21/2022    Cysto, Lt URS, RPHG, LL, Stone Extraction, Lt Stent Placement - Dr. Steel    Other surgical history  02/03/2022    Cysto Stent Removal- Dr. Steel    Renal endoscopy  3/10/08    Performed by DEBORAH CALDERON at [facility]    Special service or report  May 9, 2007    Cysto, stone extraction    Special service or report  Sept. 20, 2006    Neph tube insertion with endopyelotomy    Special service or report  Oct 21, 2007    Larger ureteral stent placed    Urology surgery procedure unlisted  5/23/08    Performed by DEBORAH CALDERON at [facility]    X-ray antegrade pyelogram tube  3/10/08    Performed by EDBORAH CALDERON at [facility]    X-ray retrograde pyelogram   3/10/08    Performed by DEBORAH KOHLI at [facility]    X-ray retrograde pyelogram  5/23/08    Performed by DEBORAH KOHLI at [facility]    X-ray retrograde pyelogram  4/22/2014    Procedure: LITHOTRIPSY WITH CYSTOSCOPY, STENT PLACEMENT;  Surgeon: Dex Jane DO;  Location: [facility]                Social History     Socioeconomic History    Marital status: Single   Tobacco Use    Smoking status: Never    Smokeless tobacco: Never   Vaping Use    Vaping status: Never Used   Substance and Sexual Activity    Alcohol use: Yes     Comment: ocassionally    Drug use: Yes     Frequency: 7.0 times per week     Types: Cannabis     Comment: medical     Social Drivers of Health     Food Insecurity: Low Risk  (2/19/2024)    Received from [facility]    Food Insecurity     Have there been times that your food ran out, and you didn't have money to get more?: No     Are there times that you worry that this might happen?: No   Transportation Needs: Low Risk  (2/19/2024)    Received from [facility]    Transportation Needs     Do you have trouble getting transportation to medical appointments?: No                  Physical Exam     ED Triage Vitals   BP 03/13/25 1254 120/85   Pulse 03/13/25 1254 102   Resp 03/13/25 1254 20   Temp 03/13/25 1254 97.7 °F (36.5 °C)   Temp src --    SpO2 03/13/25 1254 97 %   O2 Device 03/13/25 1920 None (Room air)       Current Vitals:   Vital Signs  BP: 116/76  Pulse: 60  Resp: 16  Temp: 97.7 °F (36.5 °C)  MAP (mmHg): 87    Oxygen Therapy  SpO2: 99 %  O2 Device: None (Room air)        Physical Exam  General:  Vitals as listed.  Appears uncomfortable  Lungs: good air exchange and clear   Heart: regular rate rhythm and no murmur   Abdomen: Tenderness on palpation in the left flank and left upper quadrant.  No abdominal masses.  No peritoneal signs   Extremities: no edema, normal peripheral pulses   Neuro: Alert  oriented and nonfocal   Skin: no rashes or nodules    ED Course     Labs Reviewed   COMP METABOLIC PANEL (14) - Abnormal; Notable for the following components:       Result Value    Glucose 103 (*)     Albumin 5.0 (*)     All other components within normal limits   URINALYSIS WITH CULTURE REFLEX - Abnormal; Notable for the following components:    Clarity Urine Turbid (*)     Blood Urine Trace (*)     Protein Urine Trace (*)     RBC Urine 3-5 (*)     Squamous Epi. Cells Few (*)     All other components within normal limits   LIPASE - Normal   CBC WITH DIFFERENTIAL WITH PLATELET            CT ABDOMEN+PELVIS KIDNEYSTONE 2D RNDR(NO IV,NO ORAL)(CPT=74176)    Result Date: 3/13/2025  PROCEDURE:  CT ABDOMEN+PELVIS KIDNEYSTONE 2D RNDR(NO IV,NO ORAL)(CPT=74176)  COMPARISON:  [facility] , CT, CT ABDOMEN+PELVIS(CPT=74176), 2/24/2025, 8:17 PM.  INDICATIONS:  possible kidney stone  TECHNIQUE:  Unenhanced multislice CT scanning from above the kidneys to below the urinary bladder.  2D rendering are generated on the CT scanner workstation to localize potential stones in the cranio-caudal plane.  Dose reduction techniques were used. Dose information is transmitted to the ACR (American College of Radiology) NRDR (National Radiology Data Registry) which includes the Dose Index Registry.  PATIENT STATED HISTORY: (As transcribed by Technologist)  Pt with vomiting and left sided flank pain. Hx of kidney stone. Renal stent placed 3/3 and removed 3/5. Intermittent hematuria.   FINDINGS: Evaluation of the visceral organs is limited due to the lack of IV contrast. LUNG BASE:  Unremarkable. LIVER:  Unremarkable. BILIARY:  Unremarkable. SPLEEN:  Unremarkable. PANCREAS:  Unremarkable. ADRENALS:  Unremarkable. KIDNEYS:  Stable 6 mm cortical hyperdensity in the mid right kidney may represent a proteinaceous cyst, with other etiologies not entirely excluded.  This could be further assessed with renal ultrasound as clinically directed.  Nonobstructing  stones in the left kidney measuring up to 7 mm.  There is mild left perinephric stranding with moderate to severe left hydronephrosis that is not significantly changed when compared to the previous exam, however there is new mild urothelial thickening and fatty induration along the dilated renal pelvis extending down the left ureter.  An underlying UPJ obstruction/stricture, recently passed stone, pyelonephritis (including xanthogranulomatous pyelonephritis), or other etiologies are not entirely excluded.  Clinical correlation recommended.  Surgical clips again noted in the left perinephric region. AORTA/VASCULAR:  Unremarkable. RETROPERITONEUM:  Scattered clustered prominent retroperitoneal lymph nodes measuring under 1 cm short axis are again noted which are most likely reactive. BOWEL/MESENTERY:  Unremarkable appendix.  Scattered feces in the colon.  No large or small bowel dilatation.  Minimal free fluid in the pelvis.  No free air. ABDOMINAL WALL:  Unremarkable. PELVIC ORGANS:  Urinary bladder wall thickening with fatty induration is concerning for cystitis.  Clinical correlation recommended.  Prostate calcifications. LYMPH NODES:  Probable reactive retroperitoneal lymph nodes. BONES:  Bilateral L5 pars defects. OTHER:  None.             CONCLUSION:   1. There is mild left perinephric stranding with moderate to severe left hydronephrosis that is not significantly changed when compared to the previous exam, however there is new mild urothelial thickening and fatty induration along the dilated renal pelvis extending down the left ureter.  An underlying UPJ obstruction/stricture, recently passed stone, pyelonephritis (including xanthogranulomatous pyelonephritis), or other etiologies are not entirely excluded.  Clinical correlation recommended.  2. Urinary bladder wall thickening with fatty induration is concerning for cystitis.  Clinical correlation recommended.    3. Left nephrolithiasis.  4. Stable 6 mm  cortical hyperdensity in the mid right kidney may represent a proteinaceous cyst, with other etiologies not entirely excluded.  This could be further assessed with renal ultrasound as clinically directed.  5. Minimal nonspecific free fluid in the pelvis.     Please see above for further details.  LOCATION:  [id number]   Dictated by (CST): Scout Leblanc MD on 3/13/2025 at 5:44 PM     Finalized by (CST): Scotu Leblanc MD on 3/13/2025 at 5:51 PM              MDM      34-year-old male with history of previous kidney stones presents reporting left-sided abdominal and flank pain.  Recent stent placement for stones and stent removed 1 week ago.  Having vomiting.  Severe pain.    Additional history obtained by [facility] who reports that the patient has had numerous surgeries on the left kidney and has recurrent kidney pain.  That often improve with time and anti-inflammatories    Differential includes but is not limited to pyelonephritis, ureteral stone, musculoskeletal, a life threat.    CBC, CMP, urinalysis, lipase, CT abdomen and pelvis ordered for further evaluation.  Zofran 4 mg IV, Toradol 30 mg IV.    My independent interpretation of CT of the abdomen is that there is no free air.  I did review radiology documentation which describes some perinephric stranding with hydronephrosis on the left side.  Reviewed this imaging with Dr. Ghosh from urology who feels it is related to his recent procedure.  She does agree with plan for hospitalization for pain management as she states the patient has not typically 1 to complain.  He would like to stay in the hospital.  Has required multiple rounds of Dilaudid 1 mg for pain management here.  Admitted to the [facility] hospitalist.        Admission disposition: 3/13/2025  7:24 PM           Medical Decision Making      Disposition and Plan     Clinical Impression:  1. Flank pain         Disposition:  Admit  3/13/2025  7:24 pm    Follow-up:  No follow-up provider  specified.        Medications Prescribed:  Current Discharge Medication List              Supplementary Documentation:         Hospital Problems       Present on Admission  Date Reviewed: 5/29/2022            ICD-10-CM Noted POA    * (Principal) Flank pain R10.9 3/13/2025 Unknown

## 2025-03-13 NOTE — ED INITIAL ASSESSMENT (HPI)
Patient arrives to ED with vomiting and flank pain since 0600 this morning. History of kidney stones, renal stent placed 3/3, removed 3/5 at [place]. No fevers. Burning with urination. States he has intermittently seen blood in urine.

## 2025-03-14 VITALS
WEIGHT: 176 LBS | SYSTOLIC BLOOD PRESSURE: 131 MMHG | RESPIRATION RATE: 20 BRPM | HEART RATE: 86 BPM | TEMPERATURE: 98 F | DIASTOLIC BLOOD PRESSURE: 69 MMHG | BODY MASS INDEX: 23.33 KG/M2 | OXYGEN SATURATION: 100 % | HEIGHT: 73 IN

## 2025-03-14 LAB
ANION GAP SERPL CALC-SCNC: 6 MMOL/L (ref 0–18)
BASOPHILS # BLD AUTO: 0.04 X10(3) UL (ref 0–0.2)
BASOPHILS NFR BLD AUTO: 0.5 %
BUN BLD-MCNC: 12 MG/DL (ref 9–23)
CALCIUM BLD-MCNC: 8.6 MG/DL (ref 8.7–10.6)
CHLORIDE SERPL-SCNC: 107 MMOL/L (ref 98–112)
CO2 SERPL-SCNC: 30 MMOL/L (ref 21–32)
CREAT BLD-MCNC: 1.02 MG/DL
EGFRCR SERPLBLD CKD-EPI 2021: 100 ML/MIN/1.73M2 (ref 60–?)
EOSINOPHIL # BLD AUTO: 0.05 X10(3) UL (ref 0–0.7)
EOSINOPHIL NFR BLD AUTO: 0.6 %
ERYTHROCYTE [DISTWIDTH] IN BLOOD BY AUTOMATED COUNT: 12.3 %
GLUCOSE BLD-MCNC: 94 MG/DL (ref 70–99)
HCT VFR BLD AUTO: 39.7 %
HGB BLD-MCNC: 13.7 G/DL
IMM GRANULOCYTES # BLD AUTO: 0.03 X10(3) UL (ref 0–1)
IMM GRANULOCYTES NFR BLD: 0.3 %
LYMPHOCYTES # BLD AUTO: 1.96 X10(3) UL (ref 1–4)
LYMPHOCYTES NFR BLD AUTO: 22.2 %
MCH RBC QN AUTO: 31.1 PG (ref 26–34)
MCHC RBC AUTO-ENTMCNC: 34.5 G/DL (ref 31–37)
MCV RBC AUTO: 90.2 FL
MONOCYTES # BLD AUTO: 0.74 X10(3) UL (ref 0.1–1)
MONOCYTES NFR BLD AUTO: 8.4 %
NEUTROPHILS # BLD AUTO: 6.01 X10 (3) UL (ref 1.5–7.7)
NEUTROPHILS # BLD AUTO: 6.01 X10(3) UL (ref 1.5–7.7)
NEUTROPHILS NFR BLD AUTO: 68 %
OSMOLALITY SERPL CALC.SUM OF ELEC: 296 MOSM/KG (ref 275–295)
PLATELET # BLD AUTO: 169 10(3)UL (ref 150–450)
POTASSIUM SERPL-SCNC: 3.9 MMOL/L (ref 3.5–5.1)
RBC # BLD AUTO: 4.4 X10(6)UL
SODIUM SERPL-SCNC: 143 MMOL/L (ref 136–145)
WBC # BLD AUTO: 8.8 X10(3) UL (ref 4–11)

## 2025-03-14 PROCEDURE — 80048 BASIC METABOLIC PNL TOTAL CA: CPT | Performed by: STUDENT IN AN ORGANIZED HEALTH CARE EDUCATION/TRAINING PROGRAM

## 2025-03-14 PROCEDURE — 85025 COMPLETE CBC W/AUTO DIFF WBC: CPT | Performed by: STUDENT IN AN ORGANIZED HEALTH CARE EDUCATION/TRAINING PROGRAM

## 2025-03-14 RX ORDER — IBUPROFEN 400 MG/1
400 TABLET, FILM COATED ORAL EVERY 6 HOURS PRN
Qty: 30 TABLET | Refills: 0 | Status: SHIPPED | COMMUNITY
Start: 2025-03-14 | End: 2025-03-26 | Stop reason: CLARIF

## 2025-03-14 RX ORDER — ACETAMINOPHEN 500 MG
TABLET ORAL EVERY 4 HOURS PRN
Status: SHIPPED | COMMUNITY
Start: 2025-03-14

## 2025-03-14 RX ORDER — CEPHALEXIN 500 MG/1
500 CAPSULE ORAL 3 TIMES DAILY
Qty: 15 CAPSULE | Refills: 0 | Status: SHIPPED | OUTPATIENT
Start: 2025-03-14 | End: 2025-03-26 | Stop reason: CLARIF

## 2025-03-14 NOTE — PLAN OF CARE
Denies any difficulty urinating. C/o flank pain. Per tolering diet and Po pain meds.  Ok to go home per urology. Pt would like pain meds prescribed for home. Per urology, tly and motrin should be ok.

## 2025-03-14 NOTE — H&P
[facility] History and Physical     PCP:  FRANK LOERA      Chief Complaint: flank pain     History of Present Illness: Patient is a 34 year old male with hx of kidney stones s/p stent and removal about 1 week PTA, here with flank pain.     Pt had cysto and left urteral stent placement on 3/3/25.  On 3/5 pt had stent removed.  Pt was doing well until yesterday morning he had flank pain which was severe promptinng him to come to the ED    In the ED, pt had CT scan with mild left perinephric stranding with moderate to severe left hydronephrosis, urology consulted.     This AM pt feels better, required some pain meds earlier but now pain  has improved.  No dysuria no hematuria noted.       Current Meds  Scheduled Meds:   Continuous Infusions:    sodium chloride 100 mL/hr at 03/14/25 0645     PRN Meds:   acetaminophen    acetaminophen **OR** HYDROcodone-acetaminophen **OR** HYDROcodone-acetaminophen    HYDROmorphone **OR** HYDROmorphone **OR** HYDROmorphone    melatonin    polyethylene glycol (PEG 3350)    sennosides    bisacodyl    fleet enema    ondansetron    prochlorperazine    ketorolac    Allergies[1]     Past Medical History:    Anxiety    Calculus of kidney    Congenital obstruction of ureteropelvic junction    Depression    Renal disorder    congenital left ureter stricture       Past Surgical History:   Procedure Laterality Date    Cystoscopy,+ureteroscopy Left 7/31/10    s/p left rpg, left urs, left renoscopy, cysto, with stent placement 7-31-10 at [facility], Dr John Sheffield    Cystoscopy,+ureteroscopy Left 12/3/10    L URS, Dr. Sheffield    Cystoscopy,+ureteroscopy Left     Cysto, URS, RPG, stent Bladder Stone removal-Dr Wilson    Cystoscopy,+ureteroscopy Left 3/19/15    no stent placed, [facility] DIONY    Cystoscopy,insert ureteral stent  4/22/2014    Procedure: LITHOTRIPSY WITH CYSTOSCOPY, STENT PLACEMENT;  Surgeon: Dex Jane DO;  Location: [facility]    Cystoscopy,remv calculus,simple  12/27/07     Performed by DEBORAH CALDERON at [facility]    Cystoscopy,remv calculus,simple  5/23/08    Performed by DEBORAH CALDERON at [facility]    Cystourethroscopy Left 12/27/10    cysto stent removal- Dr. Calderon    Cystourethroscopy Left 12/19/14    Cysto Stent Removal-Dr Jane    Cystourethroscopy,ureter catheter  3/10/08    Performed by DEBORAH CALDERON at [facility]    Cystourethroscopy,ureter catheter  4/22/2014    Procedure: LITHOTRIPSY WITH CYSTOSCOPY, STENT PLACEMENT;  Surgeon: Dex Jane DO;  Location: [facility]    Fragmenting of kidney stone  9/25/07    Performed by DEBORAH CALDERON at [facility]    Fragmenting of kidney stone  9/25/07    Performed by DEBORAH CALDERON at [facility]    Fragmenting of kidney stone  4/22/2014    Procedure: LITHOTRIPSY WITH CYSTOSCOPY, STENT PLACEMENT;  Surgeon: Dex Jane DO;  Location: [facility]    Ir nephhrostomy tube  2007    Cysto stent removal, nephrostomy tube placement    Laparoscopy, surgical; pyeloplasty  Oct 11, 2007    Left UPJ repair    Lithotripsy Left June 21, 2007    Left ESWL    Other surgical history  10/8/16    Cysto, Lt RPG, Lt URS & Nephroscopy, Stone Manipulation, Stone Basket & Removal, Stent Placement-Dr. Calderon     Other surgical history      left pcnl cdh    Other surgical history      stent placement cdh    Other surgical history  12/5/16    cysto stent removal dr calderon    Other surgical history  12/11/2020    Cysto/stent removal Dr. Su    Other surgical history  06/25/2021    CYSTOSCOPY, LEFT DIAGNOSTIC URETEROSCOPY, LEFT RETROGRADE PYELOGRAM, LEFT URETERAL STENT PLACEMENT,    Other surgical history  06/11/2021    CYSTOSCOPY,LEFT URETEROSCOPY,, RETROGRADE PYELOGRAM, LEFT STENT INSERTION    Other surgical history  01/21/2022    Cysto, Lt URS, RPHG, LL, Stone Extraction, Lt Stent Placement - Dr. Steel    Other surgical history  02/03/2022    Cysto  Stent Removal- Dr. Steel    Renal endoscopy  3/10/08    Performed by DEBORAH KOHLI at [facility]    Special service or report  May 9, 2007    Cysto, stone extraction    Special service or report  Sept. 20, 2006    Neph tube insertion with endopyelotomy    Special service or report  Oct 21, 2007    Larger ureteral stent placed    Urology surgery procedure unlisted  5/23/08    Performed by DEBORAH KOHLI at [facility]    X-ray antegrade pyelogram tube  3/10/08    Performed by DEBORAH KOHLI at [facility]    X-ray retrograde pyelogram  3/10/08    Performed by DEBORAH KOHLI at [facility]    X-ray retrograde pyelogram  5/23/08    Performed by DEBORAH KOHLI at [facility]    X-ray retrograde pyelogram  4/22/2014    Procedure: LITHOTRIPSY WITH CYSTOSCOPY, STENT PLACEMENT;  Surgeon: Dex Jane DO;  Location: [facility]      Social History     Tobacco Use    Smoking status: Never    Smokeless tobacco: Never   Substance Use Topics    Alcohol use: Yes     Comment: ocassionally             Intake/Output:  I/O last 3 completed shifts:  In: 2290 [P.O.:360; I.V.:1930]  Out: 625 [Urine:625]  Wt Readings from Last 3 Encounters:   03/13/25 176 lb (79.8 kg)   03/03/25 178 lb (80.7 kg)   02/24/25 170 lb (77.1 kg)         Exam:     Temp:  [97.7 °F (36.5 °C)-98.4 °F (36.9 °C)] 98.1 °F (36.7 °C)  Pulse:  [] 61  Resp:  [10-20] 18  BP: (109-128)/(54-85) 109/54  SpO2:  [93 %-100 %] 93 %  General:  Alert, no distress, appears stated age.   Head:  Normocephalic, without obvious abnormality, atraumatic.    Eyes:  Sclera anicteric, No conjunctival pallor, EOMs intact.    Throat: MMM     Neck: Supple, symmetrical, trachea midline    Lungs:   Clear to auscultation bilaterally. Normal effort    Chest wall:  No tenderness or deformity.   Heart:  Regular rate and rhythm, no peripheral edema    Abdomen:   Soft, NT/ND, +bs    MSK: Atraumatic, no  cyanosis or edema.    Skin: No visible rashes or lesions.     Neurologic: Normal strength, no focal deficit appreciated            Labs:       Lab Results   Component Value Date    WBC 8.8 03/14/2025    HGB 13.7 03/14/2025    HCT 39.7 03/14/2025    .0 03/14/2025    CREATSERUM 1.02 03/14/2025    BUN 12 03/14/2025     03/14/2025    K 3.9 03/14/2025     03/14/2025    CO2 30.0 03/14/2025    GLU 94 03/14/2025    CA 8.6 03/14/2025    ALB 5.0 03/13/2025    ALKPHO 72 03/13/2025    BILT 0.7 03/13/2025    TP 7.7 03/13/2025    AST 20 03/13/2025    ALT 17 03/13/2025    LIP 27 03/13/2025               Assessment/Plan:  Patient is a 34 year old male with hx of kidney stones s/p stent and removal about 1 week PTA, here with flank pain.          Plan:    Left flank pain   - moderate to severe hydro on CT scan  - UA negative   - recent stent placement and subsequent removal 3/5   - NPO IV fluids, pain meds- advised to try oral pain meds and IV toradol, limit IV narcotics as able   - urology consulted     Prophylaxis:  DVT: SCD       Rochelle Reyes MD             [1]   Allergies  Allergen Reactions    Morphine RASH    Dilaudid [Hydromorphone] ITCHING     Pt states can tolerate with benadryl     Fentanyl ITCHING     Pt states can tolerate with benadryl

## 2025-03-14 NOTE — CONSULTS
[facility]  Department of Urology   Consultation Note    Austin Mcfarland Patient Status:  Inpatient    1990 MRN [id number]   Location [facility] 3NW-A Attending Salty Adams MD   Hosp Day # 1 PCP FRANK LOERA     Reason for Consultation:  Left flank pain    History of Present Illness:  Austin Mcfarland is a a(n) 34 year old male    History of left UPJ obstruction, hydronephrosis, recurrent ureteral stricture disease  S/P robotic assisted lap left ureterolysis with buccal mucosal graft ureteroplasty, stent placement, 22.     Recurrent left urolithiasis  Patient underwent cystoscopy, left RGPG, left ureteral stent placement, left URS/laser lithotripsy 3/3/25 with Dr. Johnson  Findings: There was no dilation of ureter and the lumen appeared to be completely patent at the previous reconstruction site (with typical ballooning of the interposed graft); the kidney calyces were observed to be very dilated and hydronephrotic with a small lower pole filling defect, most likely signifying the stone.    Stent removed    Patient presented with left flank pain. Pain started yesterday.  +nausea, vomiting.  No fever or chills. +hematuria.  No change in urination.       Labs:  WBC 4.3 > 8.8  Hgb 15.4 > 13.7  Platelet count 198 > 169  Serum creat 1.08 > 0.99    UA 3/13/25: 1-5 WBC/hpf, 3-5 RBC/hpf, no bacteria, few squamous epithelia cells    Abdominal/pelvic CT scan without contrast 3/13/25:  mild left perinephric stranding with moderate to severe left hydronephrosis that is not significantly changed when compared to the previous exam, however there is new mild urothelial thickening and fatty induration along the dilated renal   pelvis extending down the left ureter.  An underlying UPJ obstruction/stricture, recently passed stone, pyelonephritis (including xanthogranulomatous pyelonephritis), or other etiologies are not entirely excluded.  Clinical correlation recommended.   2.  Urinary bladder wall thickening with fatty induration is concerning for cystitis.  Clinical correlation recommended.     3. Left nephrolithiasis.     History:  Past Medical History:    Anxiety    Calculus of kidney    Congenital obstruction of ureteropelvic junction    Depression    Renal disorder    congenital left ureter stricture      Past Surgical History:   Procedure Laterality Date    Cystoscopy,+ureteroscopy Left 7/31/10    s/p left rpg, left urs, left renoscopy, cysto, with stent placement 7-31-10 at [facility], Dr John Sheffield    Cystoscopy,+ureteroscopy Left 12/3/10    L URS, Dr. Sheffield    Cystoscopy,+ureteroscopy Left     Cysto, URS, RPG, stent Bladder Stone removal-Dr Wilson    Cystoscopy,+ureteroscopy Left 3/19/15    no stent placed, [facility] DION[facility]    Cystoscopy,insert ureteral stent  4/22/2014    Procedure: LITHOTRIPSY WITH CYSTOSCOPY, STENT PLACEMENT;  Surgeon: Dex Jane DO;  Location: [facility]    Cystoscopy,remv calculus,simple  12/27/07    Performed by JOHN SHEFFIELD at [facility], [facility]    Cystoscopy,remv calculus,simple  5/23/08    Performed by JOHN SHEFFIELD at [facility], [facility]    Cystourethroscopy Left 12/27/10    cysto stent removal- Dr. Sheffield    Cystourethroscopy Left 12/19/14    Cysto Stent Removal-Dr Jane    Cystourethroscopy,ureter catheter  3/10/08    Performed by JOHN SHEFFIELD at [facility], [facility]    Cystourethroscopy,ureter catheter  4/22/2014    Procedure: LITHOTRIPSY WITH CYSTOSCOPY, STENT PLACEMENT;  Surgeon: Dex Jane DO;  Location: [facility], [facility]    Fragmenting of kidney stone  9/25/07    Performed by JOHN SHEFFIELD at [facility], [facility]    Fragmenting of kidney stone  9/25/07    Performed by JOHN SHEFFIELD at [facility], [facility]    Fragmenting of kidney stone  4/22/2014    Procedure: LITHOTRIPSY WITH CYSTOSCOPY, STENT PLACEMENT;  Surgeon: Dex Jane DO;  Location: [facility], [facility]    Ir nephhrostomy  tube  2007    Cysto stent removal, nephrostomy tube placement    Laparoscopy, surgical; pyeloplasty  Oct 11, 2007    Left UPJ repair    Lithotripsy Left June 21, 2007    Left ESWL    Other surgical history  10/8/16    Cysto, Lt RPG, Lt URS & Nephroscopy, Stone Manipulation, Stone Basket & Removal, Stent Placement-Dr. Calderon     Other surgical history      left pcnl cdh    Other surgical history      stent placement cdh    Other surgical history  12/5/16    cysto stent removal dr calderon    Other surgical history  12/11/2020    Cysto/stent removal Dr. Su    Other surgical history  06/25/2021    CYSTOSCOPY, LEFT DIAGNOSTIC URETEROSCOPY, LEFT RETROGRADE PYELOGRAM, LEFT URETERAL STENT PLACEMENT,    Other surgical history  06/11/2021    CYSTOSCOPY,LEFT URETEROSCOPY,, RETROGRADE PYELOGRAM, LEFT STENT INSERTION    Other surgical history  01/21/2022    Cysto, Lt URS, RPHG, LL, Stone Extraction, Lt Stent Placement - Dr. Steel    Other surgical history  02/03/2022    Cysto Stent Removal- Dr. Steel    Renal endoscopy  3/10/08    Performed by DEBORAH CALDERON at [facility]    Special service or report  May 9, 2007    Cysto, stone extraction    Special service or report  Sept. 20, 2006    Neph tube insertion with endopyelotomy    Special service or report  Oct 21, 2007    Larger ureteral stent placed    Urology surgery procedure unlisted  5/23/08    Performed by DEBORAH CALDERON at [facility]    X-ray antegrade pyelogram tube  3/10/08    Performed by DEBORAH CALDERON at [facility]    X-ray retrograde pyelogram  3/10/08    Performed by DEBORAH CALDERON at [facility]    X-ray retrograde pyelogram  5/23/08    Performed by DEBORAH CALDERON at [facility]    X-ray retrograde pyelogram  4/22/2014    Procedure: LITHOTRIPSY WITH CYSTOSCOPY, STENT PLACEMENT;  Surgeon: Dex Jane DO;  Location: [facility]     No family history on file.   reports that he has  never smoked. He has never used smokeless tobacco. He reports current alcohol use. He reports current drug use. Frequency: 7.00 times per week. Drug: Cannabis.    Allergies:  Allergies[1]    Medications:    Current Facility-Administered Medications:     sodium chloride 0.9% infusion, , Intravenous, Continuous    acetaminophen (Tylenol Extra Strength) tab 500 mg, 500 mg, Oral, Q4H PRN    acetaminophen (Tylenol) tab 650 mg, 650 mg, Oral, Q4H PRN **OR** HYDROcodone-acetaminophen (Norco) 5-325 MG per tab 1 tablet, 1 tablet, Oral, Q4H PRN **OR** HYDROcodone-acetaminophen (Norco) 5-325 MG per tab 2 tablet, 2 tablet, Oral, Q4H PRN    HYDROmorphone (Dilaudid) 1 MG/ML injection 0.2 mg, 0.2 mg, Intravenous, Q4H PRN **OR** HYDROmorphone (Dilaudid) 1 MG/ML injection 0.4 mg, 0.4 mg, Intravenous, Q4H PRN **OR** HYDROmorphone (Dilaudid) 1 MG/ML injection 0.8 mg, 0.8 mg, Intravenous, Q4H PRN    melatonin tab 3 mg, 3 mg, Oral, Nightly PRN    polyethylene glycol (PEG 3350) (Miralax) 17 g oral packet 17 g, 17 g, Oral, Daily PRN    sennosides (Senokot) tab 17.2 mg, 17.2 mg, Oral, Nightly PRN    bisacodyl (Dulcolax) 10 MG rectal suppository 10 mg, 10 mg, Rectal, Daily PRN    fleet enema (Fleet) rectal enema 133 mL, 1 enema, Rectal, Once PRN    ondansetron (Zofran) 4 MG/2ML injection 4 mg, 4 mg, Intravenous, Q6H PRN    prochlorperazine (Compazine) 10 MG/2ML injection 5 mg, 5 mg, Intravenous, Q8H PRN    ketorolac (Toradol) 15 MG/ML injection 15 mg, 15 mg, Intravenous, Q6H PRN    Review of Systems:  Pertinent items are noted in HPI.  10 point review of systems completed.    Physical Exam:  /54 (BP Location: Right arm)   Pulse 61   Temp 98.1 °F (36.7 °C) (Oral)   Resp 18   Ht 6' 1\" (1.854 m)   Wt 176 lb (79.8 kg)   SpO2 93%   BMI 23.22 kg/m²   GENERAL: the patient is resting in bed in no acute distress.    HEENT: Unremarkable.  NECK: Supple.    LUNGS: non-labored respirations.    ABDOMEN: The abdomen is soft and nontender  without rebound or guarding.      BACK: left CVA tenderness  SKIN: Without stigmata.   NEUROLOGIC: Grossly intact.  EXTREMITIES: Without edema.      Laboratory Data:  Lab Results   Component Value Date    WBC 8.8 03/14/2025    HGB 13.7 03/14/2025    HCT 39.7 03/14/2025    .0 03/14/2025    CREATSERUM 0.99 03/13/2025    BUN 13 03/13/2025     03/13/2025    K 4.3 03/13/2025     03/13/2025    CO2 27.0 03/13/2025     03/13/2025    CA 9.6 03/13/2025    ALB 5.0 03/13/2025    ALKPHO 72 03/13/2025    BILT 0.7 03/13/2025    TP 7.7 03/13/2025    AST 20 03/13/2025    ALT 17 03/13/2025    LIP 27 03/13/2025     WBC 4.3 > 8.8  Hgb 15.4 > 13.7  Platelet count 198 > 169  Serum creat 1.08 > 0.99    UA 3/13/25: 1-5 WBC/hpf, 3-5 RBC/hpf, no bacteria, few squamous epithelia cells    Imaging:  CT ABDOMEN+PELVIS KIDNEYSTONE 2D RNDR(NO IV,NO ORAL)(CPT=74176)    Result Date: 3/13/2025  CONCLUSION:   1. There is mild left perinephric stranding with moderate to severe left hydronephrosis that is not significantly changed when compared to the previous exam, however there is new mild urothelial thickening and fatty induration along the dilated renal pelvis extending down the left ureter.  An underlying UPJ obstruction/stricture, recently passed stone, pyelonephritis (including xanthogranulomatous pyelonephritis), or other etiologies are not entirely excluded.  Clinical correlation recommended.  2. Urinary bladder wall thickening with fatty induration is concerning for cystitis.  Clinical correlation recommended.    3. Left nephrolithiasis.  4. Stable 6 mm cortical hyperdensity in the mid right kidney may represent a proteinaceous cyst, with other etiologies not entirely excluded.  This could be further assessed with renal ultrasound as clinically directed.  5. Minimal nonspecific free fluid in the pelvis.     Please see above for further details.  LOCATION:  [facility]   Dictated by (CST): Scout Leblanc MD on 3/13/2025 at  5:44 PM     Finalized by (CST): Scout Leblanc MD on 3/13/2025 at 5:51 PM       Impression:  Patient Active Problem List   Diagnosis    Ureteropelvic junction obstruct, congenital    Recurrent kidney stones    Hydronephrosis of left kidney    Flank pain     History of left UPJ obstruction, hydronephrosis, recurrent ureteral stricture disease  S/P robotic assisted lap left ureterolysis with buccal mucosal graft ureteroplasty, stent placement, 5/27/22    Recurrent left urolithiasis  Patient underwent cystoscopy, left RGPG, left ureteral stent placement, left URS/laser lithotripsy 3/3/25 with Dr. Johnson  Findings: There was no dilation of ureter and the lumen appeared to be completely patent at the previous reconstruction site (with typical ballooning of the interposed graft); the kidney calyces were observed to be very dilated and hydronephrotic with a small lower pole filling defect  Stent removed by patient    Admitted with left flank pain, hematuria  Afebrile, VSS  WBC 4.3 > 8.8  Hgb 15.4 > 13.7  Platelet count 198 > 169  Serum creat 1.08 > 0.99  UA 3/13/25: 1-5 WBC/hpf, 3-5 RBC/hpf, no bacteria, few squamous epithelia cells  Abdominal/pelvic CT scan 3/13/25: There is mild left perinephric stranding with moderate to severe left hydronephrosis that is not significantly changed when compared to the previous exam, however there is new mild urothelial thickening and fatty induration along the dilated renal pelvis extending down the left ureter.   Recent cystoscopy/URS 3/3/25: no bladder lesions/masses.  no dilation of ureter and the lumen appeared to be completely patent at the previous reconstruction site (with typical ballooning of the interposed graft); the kidney calyces were observed to be very dilated and hydronephrotic with a small lower pole filling defect, most likely signifying the stone.     Recommendations:  Dr. Barajas reviewed CT scan  No acute urological intervention required - no ureteral stone on CT  scan.  URS from 3/3/25: there was no dilation of ureter and the lumen appeared to be completely patent at the previous reconstruction site (with typical ballooning of the interposed graft)  Pain management  Follow labs, temp    Above discussed with patient, nurse, hospitalist, Dr. Barajas.    Thank you for allowing me to participate in the care of your patient.    Addendum: 17:14> Spoke to pt by jeanne at nurse request. I had reviewed retrograde and current CT images and recent ureteroscopy note with Dr. Johnson. No obstruction suspected. Pt said he had dysuria and transient gross hematuria yesterday. No bacteriuria. I ordered a urine culture and 5 days of cephalexin 500 TID.    Monse Caldwell PA-C  [facility]  Department of Urology  3/14/2025  6:03 AM         [1]   Allergies  Allergen Reactions    Morphine RASH    Dilaudid [Hydromorphone] ITCHING     Pt states can tolerate with benadryl     Fentanyl ITCHING     Pt states can tolerate with benadryl

## 2025-03-14 NOTE — PROGRESS NOTES
NURSING ADMISSION NOTE      Patient admitted via Cart  Oriented to room.  Safety precautions initiated.  Bed in low position.  Call light in reach.    Admission navigator completed. Pt refused 2-person skin check, denies any skin breakdown.

## 2025-03-14 NOTE — PLAN OF CARE
Problem: Patient/Family Goals  Goal: Patient/Family Long Term Goal  Description: Patient's Long Term Goal:  pain mgt   Interventions:- po pain meds  - See additional Care Plan goals for specific interventions  Outcome: Progressing  Goal: Patient/Family Short Term Goal  Description: Patient's Short Term Goal: go home Interventions: - monitor fever  - See additional Care Plan goals for specific interventions  Outcome: Progressing

## 2025-03-14 NOTE — PLAN OF CARE
NEURO: A&Ox4. VSS. RA. Denies chest pain and SOB.  GI: Abdomen soft and nondistended. Pt reports belching, and passing gas. Denies N/V  : Voids with adequate output, straining urine  Pain controlled with PRN pain medications  AMB: Up ad adenike  DIET: Regular diet  FLUIDS: IVF running per order.    All appropriate safety measures in place. Patient updated on plan of care, call light within reach.

## 2025-03-26 ENCOUNTER — HOSPITAL ENCOUNTER (OUTPATIENT)
Facility: HOSPITAL | Age: 35
Setting detail: OBSERVATION
Discharge: HOME | End: 2025-03-28
Attending: EMERGENCY MEDICINE | Admitting: HOSPITALIST
Payer: COMMERCIAL

## 2025-03-26 ENCOUNTER — APPOINTMENT (OUTPATIENT)
Dept: CT IMAGING | Facility: HOSPITAL | Age: 35
End: 2025-03-26
Attending: EMERGENCY MEDICINE
Payer: COMMERCIAL

## 2025-03-26 ENCOUNTER — ANESTHESIA EVENT (OUTPATIENT)
Dept: SURGERY | Facility: HOSPITAL | Age: 35
End: 2025-03-26
Payer: COMMERCIAL

## 2025-03-26 DIAGNOSIS — N13.30 HYDRONEPHROSIS, UNSPECIFIED HYDRONEPHROSIS TYPE: Primary | ICD-10-CM

## 2025-03-26 DIAGNOSIS — N13.30 HYDRONEPHROSIS OF LEFT KIDNEY: ICD-10-CM

## 2025-03-26 LAB
ALBUMIN SERPL-MCNC: 5 G/DL (ref 3.2–4.8)
ALBUMIN/GLOB SERPL: 1.9 {RATIO} (ref 1–2)
ALP LIVER SERPL-CCNC: 80 U/L
ALT SERPL-CCNC: 14 U/L
ANION GAP SERPL CALC-SCNC: 7 MMOL/L (ref 0–18)
AST SERPL-CCNC: 16 U/L (ref ?–34)
BASOPHILS # BLD AUTO: 0.03 X10(3) UL (ref 0–0.2)
BASOPHILS NFR BLD AUTO: 0.6 %
BILIRUB SERPL-MCNC: 0.5 MG/DL (ref 0.3–1.2)
BILIRUB UR QL STRIP.AUTO: NEGATIVE
BUN BLD-MCNC: 14 MG/DL (ref 9–23)
CALCIUM BLD-MCNC: 9.7 MG/DL (ref 8.7–10.6)
CHLORIDE SERPL-SCNC: 107 MMOL/L (ref 98–112)
CO2 SERPL-SCNC: 30 MMOL/L (ref 21–32)
COLOR UR AUTO: YELLOW
CREAT BLD-MCNC: 1.05 MG/DL
EGFRCR SERPLBLD CKD-EPI 2021: 96 ML/MIN/1.73M2 (ref 60–?)
EOSINOPHIL # BLD AUTO: 0.06 X10(3) UL (ref 0–0.7)
EOSINOPHIL NFR BLD AUTO: 1.2 %
ERYTHROCYTE [DISTWIDTH] IN BLOOD BY AUTOMATED COUNT: 12.2 %
GLOBULIN PLAS-MCNC: 2.6 G/DL (ref 2–3.5)
GLUCOSE BLD-MCNC: 103 MG/DL (ref 70–99)
GLUCOSE UR STRIP.AUTO-MCNC: NORMAL MG/DL
HCT VFR BLD AUTO: 44.4 %
HGB BLD-MCNC: 15.4 G/DL
IMM GRANULOCYTES # BLD AUTO: 0.01 X10(3) UL (ref 0–1)
IMM GRANULOCYTES NFR BLD: 0.2 %
KETONES UR STRIP.AUTO-MCNC: NEGATIVE MG/DL
LEUKOCYTE ESTERASE UR QL STRIP.AUTO: NEGATIVE
LIPASE SERPL-CCNC: 39 U/L (ref 12–53)
LYMPHOCYTES # BLD AUTO: 1.48 X10(3) UL (ref 1–4)
LYMPHOCYTES NFR BLD AUTO: 30 %
MCH RBC QN AUTO: 31 PG (ref 26–34)
MCHC RBC AUTO-ENTMCNC: 34.7 G/DL (ref 31–37)
MCV RBC AUTO: 89.3 FL
MONOCYTES # BLD AUTO: 0.35 X10(3) UL (ref 0.1–1)
MONOCYTES NFR BLD AUTO: 7.1 %
NEUTROPHILS # BLD AUTO: 3.01 X10 (3) UL (ref 1.5–7.7)
NEUTROPHILS # BLD AUTO: 3.01 X10(3) UL (ref 1.5–7.7)
NEUTROPHILS NFR BLD AUTO: 60.9 %
NITRITE UR QL STRIP.AUTO: NEGATIVE
OSMOLALITY SERPL CALC.SUM OF ELEC: 299 MOSM/KG (ref 275–295)
PH UR STRIP.AUTO: 7 [PH] (ref 5–8)
PLATELET # BLD AUTO: 188 10(3)UL (ref 150–450)
POTASSIUM SERPL-SCNC: 4.2 MMOL/L (ref 3.5–5.1)
PROT SERPL-MCNC: 7.6 G/DL (ref 5.7–8.2)
PROT UR STRIP.AUTO-MCNC: NEGATIVE MG/DL
RBC # BLD AUTO: 4.97 X10(6)UL
RBC UR QL AUTO: NEGATIVE
SODIUM SERPL-SCNC: 144 MMOL/L (ref 136–145)
SP GR UR STRIP.AUTO: 1.02 (ref 1–1.03)
UROBILINOGEN UR STRIP.AUTO-MCNC: NORMAL MG/DL
WBC # BLD AUTO: 4.9 X10(3) UL (ref 4–11)

## 2025-03-26 PROCEDURE — 99223 1ST HOSP IP/OBS HIGH 75: CPT | Performed by: HOSPITALIST

## 2025-03-26 PROCEDURE — 74176 CT ABD & PELVIS W/O CONTRAST: CPT | Performed by: EMERGENCY MEDICINE

## 2025-03-26 RX ORDER — POLYETHYLENE GLYCOL 3350 17 G/17G
17 POWDER, FOR SOLUTION ORAL DAILY PRN
Status: DISCONTINUED | OUTPATIENT
Start: 2025-03-26 | End: 2025-03-28

## 2025-03-26 RX ORDER — SODIUM CHLORIDE 9 MG/ML
INJECTION, SOLUTION INTRAVENOUS CONTINUOUS
Status: DISCONTINUED | OUTPATIENT
Start: 2025-03-26 | End: 2025-03-28

## 2025-03-26 RX ORDER — SENNOSIDES 8.6 MG
17.2 TABLET ORAL NIGHTLY PRN
Status: DISCONTINUED | OUTPATIENT
Start: 2025-03-26 | End: 2025-03-28

## 2025-03-26 RX ORDER — BENZONATATE 200 MG/1
200 CAPSULE ORAL 3 TIMES DAILY PRN
Status: DISCONTINUED | OUTPATIENT
Start: 2025-03-26 | End: 2025-03-28

## 2025-03-26 RX ORDER — HYDROMORPHONE HYDROCHLORIDE 1 MG/ML
0.8 INJECTION, SOLUTION INTRAMUSCULAR; INTRAVENOUS; SUBCUTANEOUS EVERY 2 HOUR PRN
Status: DISCONTINUED | OUTPATIENT
Start: 2025-03-26 | End: 2025-03-28

## 2025-03-26 RX ORDER — KETOROLAC TROMETHAMINE 10 MG/1
10 TABLET, FILM COATED ORAL EVERY 8 HOURS PRN
COMMUNITY

## 2025-03-26 RX ORDER — HYDROMORPHONE HYDROCHLORIDE 1 MG/ML
0.4 INJECTION, SOLUTION INTRAMUSCULAR; INTRAVENOUS; SUBCUTANEOUS EVERY 2 HOUR PRN
Status: DISCONTINUED | OUTPATIENT
Start: 2025-03-26 | End: 2025-03-28

## 2025-03-26 RX ORDER — ONDANSETRON 4 MG/1
4 TABLET, ORALLY DISINTEGRATING ORAL EVERY 8 HOURS PRN
COMMUNITY

## 2025-03-26 RX ORDER — ONDANSETRON 2 MG/ML
8 INJECTION INTRAMUSCULAR; INTRAVENOUS EVERY 6 HOURS PRN
Status: DISCONTINUED | OUTPATIENT
Start: 2025-03-26 | End: 2025-03-28

## 2025-03-26 RX ORDER — HYDROMORPHONE HYDROCHLORIDE 1 MG/ML
0.2 INJECTION, SOLUTION INTRAMUSCULAR; INTRAVENOUS; SUBCUTANEOUS EVERY 2 HOUR PRN
Status: DISCONTINUED | OUTPATIENT
Start: 2025-03-26 | End: 2025-03-28

## 2025-03-26 RX ORDER — DIPHENHYDRAMINE HCL 25 MG
25 CAPSULE ORAL ONCE
Status: COMPLETED | OUTPATIENT
Start: 2025-03-26 | End: 2025-03-26

## 2025-03-26 RX ORDER — ONDANSETRON 2 MG/ML
4 INJECTION INTRAMUSCULAR; INTRAVENOUS ONCE
Status: COMPLETED | OUTPATIENT
Start: 2025-03-26 | End: 2025-03-26

## 2025-03-26 RX ORDER — BISACODYL 10 MG
10 SUPPOSITORY, RECTAL RECTAL
Status: DISCONTINUED | OUTPATIENT
Start: 2025-03-26 | End: 2025-03-28

## 2025-03-26 RX ORDER — KETOROLAC TROMETHAMINE 15 MG/ML
15 INJECTION, SOLUTION INTRAMUSCULAR; INTRAVENOUS EVERY 6 HOURS PRN
Status: DISPENSED | OUTPATIENT
Start: 2025-03-26 | End: 2025-03-28

## 2025-03-26 RX ORDER — DIPHENHYDRAMINE HYDROCHLORIDE 50 MG/ML
25 INJECTION, SOLUTION INTRAMUSCULAR; INTRAVENOUS EVERY 4 HOURS PRN
Status: DISCONTINUED | OUTPATIENT
Start: 2025-03-26 | End: 2025-03-28

## 2025-03-26 RX ORDER — HYDROMORPHONE HYDROCHLORIDE 1 MG/ML
0.5 INJECTION, SOLUTION INTRAMUSCULAR; INTRAVENOUS; SUBCUTANEOUS ONCE
Status: COMPLETED | OUTPATIENT
Start: 2025-03-26 | End: 2025-03-26

## 2025-03-26 RX ORDER — KETOROLAC TROMETHAMINE 15 MG/ML
15 INJECTION, SOLUTION INTRAMUSCULAR; INTRAVENOUS ONCE
Status: COMPLETED | OUTPATIENT
Start: 2025-03-26 | End: 2025-03-26

## 2025-03-26 RX ORDER — ACETAMINOPHEN 500 MG
500 TABLET ORAL EVERY 4 HOURS PRN
Status: DISCONTINUED | OUTPATIENT
Start: 2025-03-26 | End: 2025-03-28

## 2025-03-26 RX ORDER — ECHINACEA PURPUREA EXTRACT 125 MG
1 TABLET ORAL
Status: DISCONTINUED | OUTPATIENT
Start: 2025-03-26 | End: 2025-03-28

## 2025-03-26 NOTE — CONSULTS
[facility]  Department of Urology   Consultation Note    Austin Mcfarland Patient Status:  Emergency    1990 MRN [id number]   Location [facility] EMERGENCY DEPARTMENT Attending Victorino Avendano MD   Hosp Day # 0 PCP FRANK LOERA     Reason for Consultation:  Left flank pain  Left hydroureteronephrosis    History of Present Illness:  Austin Mcfarland is a a(n) 34 year old male.   History of left UPJ obstruction, hydronephrosis, recurrent ureteral stricture disease  S/P robotic assisted lap left ureterolysis with buccal mucosal graft ureteroplasty, stent placement, 22.      Recurrent left urolithiasis  Patient underwent cystoscopy, left RGPG, left ureteral stent placement, left URS/laser lithotripsy 3/3/25 with Dr. Johnson  Findings: There was no dilation of ureter and the lumen appeared to be completely patent at the previous reconstruction site (with typical ballooning of the interposed graft); the kidney calyces were observed to be very dilated and hydronephrotic with a small lower pole filling defect, most likely signifying the stone.    Stent removed    Patient was recently admitted to [facility] for left flank pain.  Abdominal/pelvic CT scan without contrast 3/13/25:  mild left perinephric stranding with moderate to severe left hydronephrosis that is not significantly changed when compared to the previous exam, however there is new mild urothelial thickening and fatty induration along the dilated renal   pelvis extending down the left ureter.  An underlying UPJ obstruction/stricture, recently passed stone, pyelonephritis (including xanthogranulomatous pyelonephritis), or other etiologies are not entirely excluded.  Clinical correlation recommended.   2. Urinary bladder wall thickening with fatty induration is concerning for cystitis.  Clinical correlation recommended.     3. Left nephrolithiasis.   CT reviewed by Dr. Barajas - No obstruction suspected.     Urine culture  3/13/25: <10K CFU/mL mixture of gram positive organisms isolated - probable contamination.    Patient presented with left flank pain. Patient reports left flank pain on Thursday that resolved.    Left flank pain returned at 3:30 AM today.  +nausea/vomiting  No fever or chills  Intermittent dysuria  No gross hematuria  Voiding ok    Labs:  WBC 4.9  Hgb 15.4  Platelet count 188  Serum creat 1.05    UA 3/26/25: no WBC, no RBC, no bacteria, few squamous epithelial cells    Abdominal/pelvic CT scan without contrast 3/26/25:  Moderate to severe left hydronephrosis and proximal left ureter to the level of the surgical clips anterior to the left psoas.  This appears unchanged.  The left ureter distal to this area is normal in caliber.  There is no CT evidence for obstructing urinary calculus.  There are multiple calculi noted in the mid and inferior pole of the left kidney.  Right kidney is negative for nephrolithiasis and hydronephrosis.  There is a 6 mm hyperdense cortical cyst in the right kidney. There is soft tissue stranding about the left renal pelvis and proximal left ureter unchanged.     Urology was consulted.    History:  Past Medical History:    Anxiety    Calculus of kidney    Congenital obstruction of ureteropelvic junction    Depression    Renal disorder    congenital left ureter stricture      Past Surgical History:   Procedure Laterality Date    Cystoscopy,+ureteroscopy Left 7/31/10    s/p left rpg, left urs, left renoscopy, cysto, with stent placement 7-31-10 at [facility], Dr John Sheffield    Cystoscopy,+ureteroscopy Left 12/3/10    L URS, Dr. Sheffield    Cystoscopy,+ureteroscopy Left     Cysto, URS, RPG, stent Bladder Stone removal-Dr Wilson    Cystoscopy,+ureteroscopy Left 3/19/15    no stent placed, [facility] DIONY    Cystoscopy,insert ureteral stent  4/22/2014    Procedure: LITHOTRIPSY WITH CYSTOSCOPY, STENT PLACEMENT;  Surgeon: Dex Jane DO;  Location: [facility], [facility]    Cystoscopy,remv calculus,simple   12/27/07    Performed by DEBORAH CALDERON at [facility], [facility]    Cystoscopy,remv calculus,simple  5/23/08    Performed by DEBORAH CALDERON at [facility], [facility]    Cystourethroscopy Left 12/27/10    cysto stent removal- Dr. Calderon    Cystourethroscopy Left 12/19/14    Cysto Stent Removal-Dr Jane    Cystourethroscopy,ureter catheter  3/10/08    Performed by DEBORAH CALDERON at [facility], [facility]    Cystourethroscopy,ureter catheter  4/22/2014    Procedure: LITHOTRIPSY WITH CYSTOSCOPY, STENT PLACEMENT;  Surgeon: Dex Jane DO;  Location: [facility], [facility]    Fragmenting of kidney stone  9/25/07    Performed by DEBORAH CALDERON at [facility], [facility]    Fragmenting of kidney stone  9/25/07    Performed by DEBORAH CALDERON at [facility], [facility]    Fragmenting of kidney stone  4/22/2014    Procedure: LITHOTRIPSY WITH CYSTOSCOPY, STENT PLACEMENT;  Surgeon: Dex Jane DO;  Location: [facility], [facility]    Ir nephhrostomy tube  2007    Cysto stent removal, nephrostomy tube placement    Laparoscopy, surgical; pyeloplasty  Oct 11, 2007    Left UPJ repair    Lithotripsy Left June 21, 2007    Left ESWL    Other surgical history  10/8/16    Cysto, Lt RPG, Lt URS & Nephroscopy, Stone Manipulation, Stone Basket & Removal, Stent Placement-Dr. Calderon     Other surgical history      left pcnl cdh    Other surgical history      stent placement cdh    Other surgical history  12/5/16    cysto stent removal dr calderon    Other surgical history  12/11/2020    Cysto/stent removal Dr. Su    Other surgical history  06/25/2021    CYSTOSCOPY, LEFT DIAGNOSTIC URETEROSCOPY, LEFT RETROGRADE PYELOGRAM, LEFT URETERAL STENT PLACEMENT,    Other surgical history  06/11/2021    CYSTOSCOPY,LEFT URETEROSCOPY,, RETROGRADE PYELOGRAM, LEFT STENT INSERTION    Other surgical history  01/21/2022    Cysto, Lt URS, RPHG, LL, Stone Extraction, Lt Stent Placement - Dr. Steel    Other surgical history   02/03/2022    Cysto Stent Removal- Dr. Steel    Renal endoscopy  3/10/08    Performed by DEBORAH KOHLI at [facility]    Special service or report  May 9, 2007    Cysto, stone extraction    Special service or report  Sept. 20, 2006    Neph tube insertion with endopyelotomy    Special service or report  Oct 21, 2007    Larger ureteral stent placed    Urology surgery procedure unlisted  5/23/08    Performed by DEBORAH KOHLI at [facility]    X-ray antegrade pyelogram tube  3/10/08    Performed by DEBORAH KOHLI at [facility]    X-ray retrograde pyelogram  3/10/08    Performed by DEBORAH KOHLI at [facility]    X-ray retrograde pyelogram  5/23/08    Performed by DEBORAH KOHLI at [facility]    X-ray retrograde pyelogram  4/22/2014    Procedure: LITHOTRIPSY WITH CYSTOSCOPY, STENT PLACEMENT;  Surgeon: Dex Jane DO;  Location: [facility]     No family history on file.   reports that he has never smoked. He has never used smokeless tobacco. He reports current alcohol use. He reports current drug use. Frequency: 7.00 times per week. Drug: Cannabis.    Allergies:  Allergies[1]    Medications:  No current facility-administered medications for this encounter.    Review of Systems:  Pertinent items are noted in HPI.  10 point review of systems completed.    Physical Exam:  /74 (BP Location: Right arm)   Pulse 68   Temp 97.8 °F (36.6 °C) (Oral)   Resp 18   Ht 6' 1\" (1.854 m)   Wt 170 lb (77.1 kg)   SpO2 97%   BMI 22.43 kg/m²   GENERAL: the patient is resting in bed in no acute distress.    HEENT: Unremarkable.  NECK: Supple.   LUNGS: non-labored respirations.    ABDOMEN: The abdomen is soft and nontender without rebound or guarding.     BACK: Without CVA tenderness.   SKIN: Without stigmata.   NEUROLOGIC: Grossly intact.  EXTREMITIES: Without edema.      Laboratory Data:  Lab Results   Component Value Date    WBC 4.9  03/26/2025    HGB 15.4 03/26/2025    HCT 44.4 03/26/2025    .0 03/26/2025    CREATSERUM 1.05 03/26/2025    BUN 14 03/26/2025     03/26/2025    K 4.2 03/26/2025     03/26/2025    CO2 30.0 03/26/2025     03/26/2025    CA 9.7 03/26/2025    ALB 5.0 03/26/2025    ALKPHO 80 03/26/2025    BILT 0.5 03/26/2025    TP 7.6 03/26/2025    AST 16 03/26/2025    ALT 14 03/26/2025    LIP 39 03/26/2025     UA 3/26/25: no WBC, no RBC, no bacteria, few squamous epithelial cells       Imaging:  CT ABDOMEN+PELVIS(CPT=74176)    Result Date: 3/26/2025  CONCLUSION:  Stable moderate to severe left hydronephrosis and dilatation of the proximal left ureter unchanged.  This is to the level of a surgical clip anterior to the psoas.  Findings concerning for stricture.  There is stable soft tissue stranding about the left renal pelvis and proximal left ureter.  Left nephrolithiasis without significant change.  Hyperdense cortical cyst right kidney..  LOCATION:  [facility]   Dictated by (CST): Concepción Crandall MD on 3/26/2025 at 11:21 AM     Finalized by (CST): Concepción Crandall MD on 3/26/2025 at 11:27 AM       Impression:  Patient Active Problem List   Diagnosis    Ureteropelvic junction obstruct, congenital    Recurrent kidney stones    Hydronephrosis of left kidney    Flank pain    Hydronephrosis, unspecified hydronephrosis type     History of left UPJ obstruction, hydronephrosis, recurrent ureteral stricture disease  S/P robotic assisted lap left ureterolysis with buccal mucosal graft ureteroplasty, stent placement, 5/27/22     RECURRENT LEFT UROLITHIASIS  Patient underwent cystoscopy, left RGPG, left ureteral stent placement, left URS/laser lithotripsy 3/3/25 with Dr. Johnson  Findings: There was no dilation of ureter and the lumen appeared to be completely patent at the previous reconstruction site (with typical ballooning of the interposed graft); the kidney calyces were observed to be very dilated and hydronephrotic with  a small lower pole filling defect  Stent removed by patient    ADMITTED WITH LEFT FLANK PAIN  Afebrile  UA does not appear infectious  Serum creat 1.05  Recent cystoscopy/URS 3/3/25: no bladder lesions/masses.  no dilation of ureter and the lumen appeared to be completely patent at the previous reconstruction site (with typical ballooning of the interposed graft); the kidney calyces were observed to be very dilated and hydronephrotic with a small lower pole filling defect, most likely signifying the stone.   Abdominal/pelvic CT scan without contrast 3/26/25: Moderate to severe left hydronephrosis and proximal left ureter to the level of the surgical clips anterior to the left psoas.  This appears unchanged.  The left ureter distal to this area is normal in caliber. There is no CT evidence for obstructing urinary calculus.  There are multiple calculi noted in the mid and inferior pole of the left kidney.  Right kidney is negative for nephrolithiasis and hydronephrosis.  There is a 6 mm hyperdense cortical cyst in the right kidney. There is soft tissue stranding about the left renal pelvis and proximal left ureter unchanged.     Recommendations:  Spoke with Dr. Johnson. Recommend cystourethroscopy, left retrograde pyelogram, left ureteroscopy, left ureteral stent placement tomorrow.  Reviewed risks, benefits, alternatives, and complications of the procedure including but not limited to risk of anesthesia, bladder/ureteral injury, use of nephrostomy tube, bleeding, infection, and ureteral stent related symptoms with the patient who understands and wishes to proceed. Patient informed the ureteral stent is not a permament implant and would need to exchanged or removed within 3 months. Patient agrees and understands    NPO after MN  Consent to be signed  Ancef on call to OR    Pain management  Follow labs, temp    Above discussed with patient, nurse  Will update Dr. Su and Dr. Toth    Thank you for allowing me to  participate in the care of your patient.    Monse Caldwell PA-C  [facility]  3/26/2025  12:02 PM         [1]   Allergies  Allergen Reactions    Morphine RASH    Dilaudid [Hydromorphone] ITCHING     Pt states can tolerate with benadryl     Fentanyl ITCHING     Pt states can tolerate with benadryl

## 2025-03-26 NOTE — PLAN OF CARE
Patient is A/O x 4. VSS. Afebrile. Room air. Said pain was currently controlled. NPO. Ambulatory. Voids. IVF infusing as ordered. Safety precautions in place. Call light within reach.  1700: Consent for Urological procedure signed and in chart.

## 2025-03-26 NOTE — ED INITIAL ASSESSMENT (HPI)
Pt presents to ER with hx of kidney stones and started getting a sharp pain at about 0330 this morning. Pt is nauseous but no vomiting. Pt is ambulatory in triage.

## 2025-03-26 NOTE — ED PROVIDER NOTES
Patient Seen in: [facility] Emergency Department      History     Chief Complaint   Patient presents with    Abdomen/Flank Pain     Hx of kidney stones, vomiting pain to left flank.      Stated Complaint: kidney stones    Subjective:   HPI      34-year-old man history of kidney stones,?ureteral stricture, presents with sharp left-sided flank pain that started around 330 this morning.  Sharp and severe, similar to previous kidney stones.  Associated nausea.  No fevers.  No dysuria    Objective:     Past Medical History:    Anxiety    Calculus of kidney    Congenital obstruction of ureteropelvic junction    Depression    Renal disorder    congenital left ureter stricture               Past Surgical History:   Procedure Laterality Date    Cystoscopy,+ureteroscopy Left 7/31/10    s/p left rpg, left urs, left renoscopy, cysto, with stent placement 7-31-10 at [facility], Dr John Sheffield    Cystoscopy,+ureteroscopy Left 12/3/10    L URS, Dr. Sheffield    Cystoscopy,+ureteroscopy Left     Cysto, URS, RPG, stent Bladder Stone removal-Dr Wilson    Cystoscopy,+ureteroscopy Left 3/19/15    no stent placed, [facility] DIONY    Cystoscopy,insert ureteral stent  4/22/2014    Procedure: LITHOTRIPSY WITH CYSTOSCOPY, STENT PLACEMENT;  Surgeon: Dex Jane DO;  Location: [facility]    Cystoscopy,remv calculus,simple  12/27/07    Performed by JOHN SHEFFIELD at [facility], [facility]    Cystoscopy,remv calculus,simple  5/23/08    Performed by JOHN SHEFFIELD at [facility]    Cystourethroscopy Left 12/27/10    cysto stent removal- Dr. Sheffield    Cystourethroscopy Left 12/19/14    Cysto Stent Removal-Dr Jane    Cystourethroscopy,ureter catheter  3/10/08    Performed by JOHN SHEFFIELD at [facility], [facility]    Cystourethroscopy,ureter catheter  4/22/2014    Procedure: LITHOTRIPSY WITH CYSTOSCOPY, STENT PLACEMENT;  Surgeon: Dex Jane DO;  Location: [facility]    Fragmenting of kidney stone   9/25/07    Performed by DEBORAH CALDERON at [facility], [facility]    Fragmenting of kidney stone  9/25/07    Performed by DEBORAH CALDERON at [facility], [facility]    Fragmenting of kidney stone  4/22/2014    Procedure: LITHOTRIPSY WITH CYSTOSCOPY, STENT PLACEMENT;  Surgeon: Dex Jane DO;  Location: [facility], [facility]    Ir nephhrostomy tube  2007    Cysto stent removal, nephrostomy tube placement    Laparoscopy, surgical; pyeloplasty  Oct 11, 2007    Left UPJ repair    Lithotripsy Left June 21, 2007    Left ESWL    Other surgical history  10/8/16    Cysto, Lt RPG, Lt URS & Nephroscopy, Stone Manipulation, Stone Basket & Removal, Stent Placement-Dr. Calderon     Other surgical history      left pcnl cdh    Other surgical history      stent placement cdh    Other surgical history  12/5/16    cysto stent removal dr calderon    Other surgical history  12/11/2020    Cysto/stent removal Dr. Su    Other surgical history  06/25/2021    CYSTOSCOPY, LEFT DIAGNOSTIC URETEROSCOPY, LEFT RETROGRADE PYELOGRAM, LEFT URETERAL STENT PLACEMENT,    Other surgical history  06/11/2021    CYSTOSCOPY,LEFT URETEROSCOPY,, RETROGRADE PYELOGRAM, LEFT STENT INSERTION    Other surgical history  01/21/2022    Cysto, Lt URS, RPHG, LL, Stone Extraction, Lt Stent Placement - Dr. Steel    Other surgical history  02/03/2022    Cysto Stent Removal- Dr. Steel    Renal endoscopy  3/10/08    Performed by DEBORAH CALDERON at [facility]    Special service or report  May 9, 2007    Cysto, stone extraction    Special service or report  Sept. 20, 2006    Neph tube insertion with endopyelotomy    Special service or report  Oct 21, 2007    Larger ureteral stent placed    Urology surgery procedure unlisted  5/23/08    Performed by DEBORAH CALDERON at [facility]    X-ray antegrade pyelogram tube  3/10/08    Performed by DEBORAH CALDERON at [facility]    X-ray retrograde pyelogram  3/10/08    Performed by SEUN  DEBORAH MADDOX at [facility]    X-ray retrograde pyelogram  5/23/08    Performed by DEBORAH KOHLI at [facility]    X-ray retrograde pyelogram  4/22/2014    Procedure: LITHOTRIPSY WITH CYSTOSCOPY, STENT PLACEMENT;  Surgeon: Dex Jane DO;  Location: [facility]                Social History     Socioeconomic History    Marital status: Single   Tobacco Use    Smoking status: Never    Smokeless tobacco: Never   Vaping Use    Vaping status: Never Used   Substance and Sexual Activity    Alcohol use: Yes     Comment: ocassionally    Drug use: Yes     Frequency: 7.0 times per week     Types: Cannabis     Comment: medical     Social Drivers of Health     Food Insecurity: No Food Insecurity (3/13/2025)    NCSS - Food Insecurity     Worried About Running Out of Food in the Last Year: No     Ran Out of Food in the Last Year: No   Transportation Needs: No Transportation Needs (3/13/2025)    NCSS - Transportation     Lack of Transportation: No   Housing Stability: Not At Risk (3/13/2025)    NCSS - Housing/Utilities     Has Housing: Yes     Worried About Losing Housing: No     Unable to Get Utilities: No                  Physical Exam     ED Triage Vitals [03/26/25 0842]   /81   Pulse 80   Resp 18   Temp 97.8 °F (36.6 °C)   Temp src Oral   SpO2 97 %   O2 Device None (Room air)       Current Vitals:   Vital Signs  BP: 120/76  Pulse: 82  Resp: 18  Temp: 97.8 °F (36.6 °C)  Temp src: Oral    Oxygen Therapy  SpO2: 98 %  O2 Device: None (Room air)        Physical Exam  Constitutional:       General: Uncomfortable  HENT:      Head: Normocephalic and atraumatic.      Mouth/Throat:      Mouth: Mucous membranes are moist.   Eyes:      Conjunctiva/sclera: Conjunctivae normal.      Pupils: Pupils are equal, round, and reactive to light.   Cardiovascular:      Rate and Rhythm: Normal rate and regular rhythm.   Pulmonary:      Effort: Pulmonary effort is normal. No respiratory distress.       Breath sounds: Normal breath sounds.   Abdominal: Left CVA tenderness     General: Abdomen is flat. There is no distension.      Tenderness: There is no abdominal tenderness.   Musculoskeletal:      Right lower leg: No edema.      Left lower leg: No edema.   Skin:     General: Skin is warm and dry.   Neurological:      General: No focal deficit present.      Mental Status: Is alert.       ED Course     Labs Reviewed   COMP METABOLIC PANEL (14) - Abnormal; Notable for the following components:       Result Value    Glucose 103 (*)     Calculated Osmolality 299 (*)     Albumin 5.0 (*)     All other components within normal limits   URINALYSIS WITH CULTURE REFLEX - Abnormal; Notable for the following components:    Clarity Urine Ex.Turbid (*)     Squamous Epi. Cells Few (*)     All other components within normal limits   LIPASE - Normal   CBC WITH DIFFERENTIAL WITH PLATELET   RAINBOW DRAW LAVENDER   RAINBOW DRAW LIGHT GREEN   RAINBOW DRAW BLUE   RAINBOW DRAW GOLD                   MDM      Considered all emergent etiologies, differential includes but is not limited to: Hydronephrosis, ureterolithiasis, pyelonephritis     I have independently visualized the radiology images, my focus/limited interpretation: CT scan with severe hydronephrosis, no obstructing stone     Defer to radiologist for other/incidental findings    Labs stable.  UA without evidence of infection.  CT scan with severe hydronephrosis, no obstructing stone.  Concerning for ureteral stricture.  Patient having severe pain requiring Toradol, multiple doses of Dilaudid.  Will admit for pain control and urology evaluation.  Discussed with Dr. Su for urology, Dr. Pichardo for admission.     Admission disposition: 3/26/2025 11:59 AM           Medical Decision Making      Disposition and Plan     Clinical Impression:  1. Hydronephrosis, unspecified hydronephrosis type         Disposition:  Admit  3/26/2025 11:59 am    Follow-up:  No follow-up provider  specified.        Medications Prescribed:  Current Discharge Medication List              Supplementary Documentation:         Hospital Problems       Present on Admission  Date Reviewed: 5/29/2022            ICD-10-CM Noted POA    * (Principal) Hydronephrosis, unspecified hydronephrosis type N13.30 3/26/2025 Unknown

## 2025-03-26 NOTE — ED QUICK NOTES
Orders for admission, patient is aware of plan and ready to go upstairs. Any questions, please call ED RN Juan at extension [phone].     Patient Covid vaccination status: Fully vaccinated     COVID Test Ordered in ED: None    COVID Suspicion at Admission: N/A    Running Infusions:      Mental Status/LOC at time of transport: AOx4    Other pertinent information:   CIWA score: N/A   NIH score:  N/A

## 2025-03-26 NOTE — H&P
[facility]     Austin Mcfarland Patient Status:  Emergency    1990 MRN [id number]   Location [facility] EMERGENCY DEPARTMENT Attending Victorino Avendano MD   Hosp Day # 0 PCP FRANK LOERA     Chief Complaint: flank pain    Subjective:    History of Present Illness:     Austin Mcfarland is a 34 year old male with onset of left flank pain around 4am today, sharp in nature and mostly left flank with mild radiation into left abdomen.  Mild dysuria.  No fevers or vomiting or diarrhea.  Says he has had hx of kidney stones and ureteral strictures and multiple urological procedures.    History/Other:    Past Medical History:  Past Medical History:    Anxiety    Calculus of kidney    Congenital obstruction of ureteropelvic junction    Depression    Renal disorder    congenital left ureter stricture      Past Surgical History:   Past Surgical History:   Procedure Laterality Date    Cystoscopy,+ureteroscopy Left 7/31/10    s/p left rpg, left urs, left renoscopy, cysto, with stent placement 7-31-10 at [facility], Dr John Sheffield    Cystoscopy,+ureteroscopy Left 12/3/10    L URS, Dr. Sheffield    Cystoscopy,+ureteroscopy Left     Cysto, URS, RPG, stent Bladder Stone removal-Dr Wilson    Cystoscopy,+ureteroscopy Left 3/19/15    no stent placed, [facility]    Cystoscopy,insert ureteral stent  2014    Procedure: LITHOTRIPSY WITH CYSTOSCOPY, STENT PLACEMENT;  Surgeon: Dex Jane DO;  Location: [facility]    Cystoscopy,remv calculus,simple  07    Performed by JOHN SHEFFIELD at [facility]    Cystoscopy,remv calculus,simple  08    Performed by JOHN SHEFFIELD at [facility]    Cystourethroscopy Left 12/27/10    cysto stent removal- Dr. Sheffield    Cystourethroscopy Left 14    Cysto Stent Removal-Dr Jane    Cystourethroscopy,ureter catheter  3/10/08    Performed by JOHN SHEFFIELD at [facility]    Cystourethroscopy,ureter catheter   4/22/2014    Procedure: LITHOTRIPSY WITH CYSTOSCOPY, STENT PLACEMENT;  Surgeon: Dex Jane DO;  Location: [facility]    Fragmenting of kidney stone  9/25/07    Performed by DEBORAH CALDERON at [facility]    Fragmenting of kidney stone  9/25/07    Performed by DEBORAH CALDERON at [facility]    Fragmenting of kidney stone  4/22/2014    Procedure: LITHOTRIPSY WITH CYSTOSCOPY, STENT PLACEMENT;  Surgeon: Dex Jane DO;  Location: [facility]    Ir nephhrostomy tube  2007    Cysto stent removal, nephrostomy tube placement    Laparoscopy, surgical; pyeloplasty  Oct 11, 2007    Left UPJ repair    Lithotripsy Left June 21, 2007    Left ESWL    Other surgical history  10/8/16    Cysto, Lt RPG, Lt URS & Nephroscopy, Stone Manipulation, Stone Basket & Removal, Stent Placement-Dr. Calderon     Other surgical history      left pcnl cdh    Other surgical history      stent placement cdh    Other surgical history  12/5/16    cysto stent removal dr calderon    Other surgical history  12/11/2020    Cysto/stent removal Dr. Su    Other surgical history  06/25/2021    CYSTOSCOPY, LEFT DIAGNOSTIC URETEROSCOPY, LEFT RETROGRADE PYELOGRAM, LEFT URETERAL STENT PLACEMENT,    Other surgical history  06/11/2021    CYSTOSCOPY,LEFT URETEROSCOPY,, RETROGRADE PYELOGRAM, LEFT STENT INSERTION    Other surgical history  01/21/2022    Cysto, Lt URS, RPHG, LL, Stone Extraction, Lt Stent Placement - Dr. Steel    Other surgical history  02/03/2022    Cysto Stent Removal- Dr. Steel    Renal endoscopy  3/10/08    Performed by DEBORAH CALDERON at [facility]    Special service or report  May 9, 2007    Cysto, stone extraction    Special service or report  Sept. 20, 2006    Neph tube insertion with endopyelotomy    Special service or report  Oct 21, 2007    Larger ureteral stent placed    Urology surgery procedure unlisted  5/23/08    Performed by DEBORAH CALDERON at [facility]    X-ray antegrade pyelogram tube  3/10/08    Performed by DEBORAH KOHLI at [facility]    X-ray retrograde pyelogram  3/10/08    Performed by DEBORAH KOHLI at [facility]    X-ray retrograde pyelogram  5/23/08    Performed by DEBORAH KOHLI at [facility]    X-ray retrograde pyelogram  4/22/2014    Procedure: LITHOTRIPSY WITH CYSTOSCOPY, STENT PLACEMENT;  Surgeon: Dex Jane DO;  Location: [facility]      Family History:   No family history on file.    hypertension Social History:    reports that he has never smoked. He has never used smokeless tobacco. He reports current alcohol use. He reports current drug use. Frequency: 7.00 times per week. Drug: Cannabis.     Allergies: Allergies[1]    Medications:  Medications Ordered Prior to Encounter[2]    Review of Systems:   A comprehensive 12 point review of systems was completed.    Pertinent positives and negatives noted in the HPI.    Objective:   Physical Exam:    /76   Pulse 82   Temp 97.8 °F (36.6 °C) (Oral)   Resp 18   Ht 6' 1\" (1.854 m)   Wt 170 lb (77.1 kg)   SpO2 98%   BMI 22.43 kg/m²   General: No acute distress, Alert  Respiratory: No rhonchi, no wheezes  Cardiovascular: S1, S2. Regular rate and rhythm  Abdomen: Soft,  left abdomen and flank tenderness  Neuro: No new focal deficits  Extremities: No edema      Results:    Labs:      Labs Last 24 Hours:    Recent Labs   Lab 03/26/25  0902   RBC 4.97   HGB 15.4   HCT 44.4   MCV 89.3   MCH 31.0   MCHC 34.7   RDW 12.2   NEPRELIM 3.01   WBC 4.9   .0       Recent Labs   Lab 03/26/25  0902   *   BUN 14   CREATSERUM 1.05   EGFRCR 96   CA 9.7   ALB 5.0*      K 4.2      CO2 30.0   ALKPHO 80   AST 16   ALT 14   BILT 0.5   TP 7.6       No results found for: \"PT\", \"INR\"    No results for input(s): \"TROP\", \"TROPHS\", \"CK\" in the last 168 hours.    No results for input(s): \"TROP\", \"PBNP\" in the last 168  hours.    No results for input(s): \"PCT\" in the last 168 hours.    Imaging: Imaging data reviewed in Epic.    Assessment & Plan:      #hydronephrosis, left sided and severe; probably stricture related.   NPO; fluids; prn pain meds; urology consult; likely needs cysto; do NOT suspect infection    #extensive urological ureteral surgery history    Will do med rec once review complete.    Quality:  DVT prophylaxis:  SCDs  Code Status: see chart  Michaels: NO  Michaels Duration (in days): N/A  Central line: NO  Estimated discharge date: tbd    Plan of care discussed with patient and ER MD Victorino Pichardo MD    Supplementary Documentation:                                     [1]   Allergies  Allergen Reactions    Morphine RASH    Dilaudid [Hydromorphone] ITCHING     Pt states can tolerate with benadryl     Fentanyl ITCHING     Pt states can tolerate with benadryl    [2]   Current Facility-Administered Medications on File Prior to Encounter   Medication Dose Route Frequency Provider Last Rate Last Admin    [COMPLETED] ondansetron (Zofran) 4 MG/2ML injection 4 mg  4 mg Intravenous Once Geoffrey Gong MD   4 mg at 03/13/25 1304    [COMPLETED] ketorolac (Toradol) 30 MG/ML injection 30 mg  30 mg Intravenous Once StaGeoffrey de leon MD   30 mg at 03/13/25 1629    [COMPLETED] ondansetron (Zofran) 4 MG/2ML injection 4 mg  4 mg Intravenous Once Geoffrey Gong MD   4 mg at 03/13/25 1629    [COMPLETED] sodium chloride 0.9 % IV bolus 1,000 mL  1,000 mL Intravenous Once Geoffrey Gong MD   Stopped at 03/13/25 1730    [COMPLETED] HYDROmorphone (Dilaudid) 1 MG/ML injection 1 mg  1 mg Intravenous Once StaGeoffrey de leon MD   1 mg at 03/13/25 1711    [COMPLETED] diphenhydrAMINE (Benadryl) 50 mg/mL  injection 25 mg  25 mg Intravenous Once StaGeoffrey de leon MD   25 mg at 03/13/25 1711    [COMPLETED] ondansetron (Zofran) 4 MG/2ML injection 4 mg  4 mg Intravenous Once StaGeoffrey de leon MD   4 mg at 03/13/25 1932    [COMPLETED] ceFAZolin (Ancef) 2g in  10mL IV syringe premix  2 g Intravenous Once Charlene Johnson MD   2 g at 03/03/25 1316    [COMPLETED] ondansetron (Zofran) 4 MG/2ML injection 4 mg  4 mg Intravenous Once Victorino Avendano MD   4 mg at 02/24/25 1955    [COMPLETED] ketorolac (Toradol) 15 MG/ML injection 15 mg  15 mg Intravenous Once Victorino Avendano MD   15 mg at 02/24/25 1956    [COMPLETED] diphenhydrAMINE (Benadryl) cap/tab 25 mg  25 mg Oral Once Victorino Avendano MD   25 mg at 02/24/25 2001    [COMPLETED] fentaNYL (Sublimaze) 50 mcg/mL injection 50 mcg  50 mcg Intravenous Once Victorino Avendano MD   50 mcg at 02/24/25 1957    [COMPLETED] ondansetron (Zofran) 4 MG/2ML injection 4 mg  4 mg Intravenous Once Victorino Avendano MD   4 mg at 02/24/25 2040    [COMPLETED] HYDROmorphone (Dilaudid) 1 MG/ML injection 0.5 mg  0.5 mg Intravenous Once Victorino Avendano MD   0.5 mg at 02/24/25 2102     Current Outpatient Medications on File Prior to Encounter   Medication Sig Dispense Refill    Ketorolac Tromethamine 10 MG Oral Tab Take 1 tablet (10 mg total) by mouth every 8 (eight) hours as needed for Pain.      ondansetron 4 MG Oral Tablet Dispersible Take 1 tablet (4 mg total) by mouth every 8 (eight) hours as needed for Nausea.      acetaminophen 500 MG Oral Tab Take 1-2 tablets (500-1,000 mg total) by mouth every 4 (four) hours as needed for Pain.

## 2025-03-27 ENCOUNTER — APPOINTMENT (OUTPATIENT)
Dept: GENERAL RADIOLOGY | Facility: HOSPITAL | Age: 35
End: 2025-03-27
Attending: UROLOGY
Payer: COMMERCIAL

## 2025-03-27 ENCOUNTER — ANESTHESIA (OUTPATIENT)
Dept: SURGERY | Facility: HOSPITAL | Age: 35
End: 2025-03-27
Payer: COMMERCIAL

## 2025-03-27 LAB
ANION GAP SERPL CALC-SCNC: 5 MMOL/L (ref 0–18)
BUN BLD-MCNC: 9 MG/DL (ref 9–23)
CALCIUM BLD-MCNC: 8.9 MG/DL (ref 8.7–10.6)
CHLORIDE SERPL-SCNC: 106 MMOL/L (ref 98–112)
CO2 SERPL-SCNC: 29 MMOL/L (ref 21–32)
CREAT BLD-MCNC: 0.94 MG/DL
CRP SERPL-MCNC: <0.4 MG/DL (ref ?–0.5)
EGFRCR SERPLBLD CKD-EPI 2021: 110 ML/MIN/1.73M2 (ref 60–?)
ERYTHROCYTE [DISTWIDTH] IN BLOOD BY AUTOMATED COUNT: 12.2 %
ERYTHROCYTE [SEDIMENTATION RATE] IN BLOOD: 6 MM/HR
GLUCOSE BLD-MCNC: 103 MG/DL (ref 70–99)
HCT VFR BLD AUTO: 40.2 %
HGB BLD-MCNC: 14 G/DL
MAGNESIUM SERPL-MCNC: 2.1 MG/DL (ref 1.6–2.6)
MCH RBC QN AUTO: 31.1 PG (ref 26–34)
MCHC RBC AUTO-ENTMCNC: 34.8 G/DL (ref 31–37)
MCV RBC AUTO: 89.3 FL
OSMOLALITY SERPL CALC.SUM OF ELEC: 289 MOSM/KG (ref 275–295)
PLATELET # BLD AUTO: 173 10(3)UL (ref 150–450)
POTASSIUM SERPL-SCNC: 4.3 MMOL/L (ref 3.5–5.1)
RBC # BLD AUTO: 4.5 X10(6)UL
SODIUM SERPL-SCNC: 140 MMOL/L (ref 136–145)
WBC # BLD AUTO: 4.8 X10(3) UL (ref 4–11)

## 2025-03-27 PROCEDURE — BT1F1ZZ FLUOROSCOPY OF LEFT KIDNEY, URETER AND BLADDER USING LOW OSMOLAR CONTRAST: ICD-10-PCS | Performed by: UROLOGY

## 2025-03-27 PROCEDURE — 0T778DZ DILATION OF LEFT URETER WITH INTRALUMINAL DEVICE, VIA NATURAL OR ARTIFICIAL OPENING ENDOSCOPIC: ICD-10-PCS | Performed by: UROLOGY

## 2025-03-27 PROCEDURE — 0TJ98ZZ INSPECTION OF URETER, VIA NATURAL OR ARTIFICIAL OPENING ENDOSCOPIC: ICD-10-PCS | Performed by: UROLOGY

## 2025-03-27 DEVICE — URETERAL STENT
Type: IMPLANTABLE DEVICE | Site: URETER | Status: FUNCTIONAL
Brand: CONTOUR™

## 2025-03-27 RX ORDER — FUROSEMIDE 10 MG/ML
INJECTION INTRAMUSCULAR; INTRAVENOUS AS NEEDED
Status: DISCONTINUED | OUTPATIENT
Start: 2025-03-27 | End: 2025-03-27 | Stop reason: SURG

## 2025-03-27 RX ORDER — NALOXONE HYDROCHLORIDE 0.4 MG/ML
0.08 INJECTION, SOLUTION INTRAMUSCULAR; INTRAVENOUS; SUBCUTANEOUS AS NEEDED
Status: DISCONTINUED | OUTPATIENT
Start: 2025-03-27 | End: 2025-03-27 | Stop reason: HOSPADM

## 2025-03-27 RX ORDER — SODIUM CHLORIDE, SODIUM LACTATE, POTASSIUM CHLORIDE, CALCIUM CHLORIDE 600; 310; 30; 20 MG/100ML; MG/100ML; MG/100ML; MG/100ML
INJECTION, SOLUTION INTRAVENOUS CONTINUOUS PRN
Status: DISCONTINUED | OUTPATIENT
Start: 2025-03-27 | End: 2025-03-27 | Stop reason: SURG

## 2025-03-27 RX ORDER — HYDROCODONE BITARTRATE AND ACETAMINOPHEN 5; 325 MG/1; MG/1
1 TABLET ORAL ONCE AS NEEDED
Status: DISCONTINUED | OUTPATIENT
Start: 2025-03-27 | End: 2025-03-27 | Stop reason: HOSPADM

## 2025-03-27 RX ORDER — ACETAMINOPHEN 500 MG
1000 TABLET ORAL ONCE AS NEEDED
Status: DISCONTINUED | OUTPATIENT
Start: 2025-03-27 | End: 2025-03-27 | Stop reason: HOSPADM

## 2025-03-27 RX ORDER — CEFAZOLIN SODIUM 1 G/3ML
INJECTION, POWDER, FOR SOLUTION INTRAMUSCULAR; INTRAVENOUS AS NEEDED
Status: DISCONTINUED | OUTPATIENT
Start: 2025-03-27 | End: 2025-03-27 | Stop reason: SURG

## 2025-03-27 RX ORDER — HYDROMORPHONE HYDROCHLORIDE 1 MG/ML
0.4 INJECTION, SOLUTION INTRAMUSCULAR; INTRAVENOUS; SUBCUTANEOUS EVERY 5 MIN PRN
Status: DISCONTINUED | OUTPATIENT
Start: 2025-03-27 | End: 2025-03-27 | Stop reason: HOSPADM

## 2025-03-27 RX ORDER — OXYBUTYNIN CHLORIDE 5 MG/1
5 TABLET ORAL 4 TIMES DAILY PRN
Status: DISCONTINUED | OUTPATIENT
Start: 2025-03-27 | End: 2025-03-28

## 2025-03-27 RX ORDER — MIDAZOLAM HYDROCHLORIDE 1 MG/ML
INJECTION INTRAMUSCULAR; INTRAVENOUS AS NEEDED
Status: DISCONTINUED | OUTPATIENT
Start: 2025-03-27 | End: 2025-03-27 | Stop reason: SURG

## 2025-03-27 RX ORDER — MIDAZOLAM HYDROCHLORIDE 1 MG/ML
1 INJECTION INTRAMUSCULAR; INTRAVENOUS EVERY 5 MIN PRN
Status: DISCONTINUED | OUTPATIENT
Start: 2025-03-27 | End: 2025-03-27 | Stop reason: HOSPADM

## 2025-03-27 RX ORDER — IOPAMIDOL 612 MG/ML
INJECTION, SOLUTION INTRAVASCULAR AS NEEDED
Status: DISCONTINUED | OUTPATIENT
Start: 2025-03-27 | End: 2025-03-27 | Stop reason: HOSPADM

## 2025-03-27 RX ORDER — DIPHENHYDRAMINE HYDROCHLORIDE 50 MG/ML
12.5 INJECTION, SOLUTION INTRAMUSCULAR; INTRAVENOUS AS NEEDED
Status: DISCONTINUED | OUTPATIENT
Start: 2025-03-27 | End: 2025-03-27 | Stop reason: HOSPADM

## 2025-03-27 RX ORDER — ONDANSETRON 2 MG/ML
INJECTION INTRAMUSCULAR; INTRAVENOUS AS NEEDED
Status: DISCONTINUED | OUTPATIENT
Start: 2025-03-27 | End: 2025-03-27 | Stop reason: SURG

## 2025-03-27 RX ORDER — ONDANSETRON 2 MG/ML
4 INJECTION INTRAMUSCULAR; INTRAVENOUS EVERY 6 HOURS PRN
Status: DISCONTINUED | OUTPATIENT
Start: 2025-03-27 | End: 2025-03-27 | Stop reason: HOSPADM

## 2025-03-27 RX ORDER — PROCHLORPERAZINE EDISYLATE 5 MG/ML
5 INJECTION INTRAMUSCULAR; INTRAVENOUS EVERY 8 HOURS PRN
Status: DISCONTINUED | OUTPATIENT
Start: 2025-03-27 | End: 2025-03-27 | Stop reason: HOSPADM

## 2025-03-27 RX ORDER — MEPERIDINE HYDROCHLORIDE 25 MG/ML
12.5 INJECTION INTRAMUSCULAR; INTRAVENOUS; SUBCUTANEOUS AS NEEDED
Status: DISCONTINUED | OUTPATIENT
Start: 2025-03-27 | End: 2025-03-27 | Stop reason: HOSPADM

## 2025-03-27 RX ORDER — HYDROCODONE BITARTRATE AND ACETAMINOPHEN 5; 325 MG/1; MG/1
2 TABLET ORAL ONCE AS NEEDED
Status: DISCONTINUED | OUTPATIENT
Start: 2025-03-27 | End: 2025-03-27 | Stop reason: HOSPADM

## 2025-03-27 RX ORDER — HYDROMORPHONE HYDROCHLORIDE 1 MG/ML
0.6 INJECTION, SOLUTION INTRAMUSCULAR; INTRAVENOUS; SUBCUTANEOUS EVERY 5 MIN PRN
Status: DISCONTINUED | OUTPATIENT
Start: 2025-03-27 | End: 2025-03-27 | Stop reason: HOSPADM

## 2025-03-27 RX ORDER — SODIUM CHLORIDE, SODIUM LACTATE, POTASSIUM CHLORIDE, CALCIUM CHLORIDE 600; 310; 30; 20 MG/100ML; MG/100ML; MG/100ML; MG/100ML
INJECTION, SOLUTION INTRAVENOUS CONTINUOUS
Status: DISCONTINUED | OUTPATIENT
Start: 2025-03-27 | End: 2025-03-27 | Stop reason: HOSPADM

## 2025-03-27 RX ORDER — DEXAMETHASONE SODIUM PHOSPHATE 4 MG/ML
VIAL (ML) INJECTION AS NEEDED
Status: DISCONTINUED | OUTPATIENT
Start: 2025-03-27 | End: 2025-03-27 | Stop reason: SURG

## 2025-03-27 RX ORDER — HYDROMORPHONE HYDROCHLORIDE 1 MG/ML
INJECTION, SOLUTION INTRAMUSCULAR; INTRAVENOUS; SUBCUTANEOUS
Status: COMPLETED
Start: 2025-03-27 | End: 2025-03-27

## 2025-03-27 RX ORDER — DIPHENHYDRAMINE HYDROCHLORIDE 50 MG/ML
INJECTION, SOLUTION INTRAMUSCULAR; INTRAVENOUS AS NEEDED
Status: DISCONTINUED | OUTPATIENT
Start: 2025-03-27 | End: 2025-03-27 | Stop reason: SURG

## 2025-03-27 RX ORDER — SODIUM CHLORIDE 450 MG/100ML
INJECTION, SOLUTION INTRAVENOUS CONTINUOUS
Status: DISCONTINUED | OUTPATIENT
Start: 2025-03-27 | End: 2025-03-28

## 2025-03-27 RX ORDER — LABETALOL HYDROCHLORIDE 5 MG/ML
5 INJECTION, SOLUTION INTRAVENOUS EVERY 5 MIN PRN
Status: DISCONTINUED | OUTPATIENT
Start: 2025-03-27 | End: 2025-03-27 | Stop reason: HOSPADM

## 2025-03-27 RX ORDER — MIDAZOLAM HYDROCHLORIDE 1 MG/ML
INJECTION INTRAMUSCULAR; INTRAVENOUS
Status: COMPLETED
Start: 2025-03-27 | End: 2025-03-27

## 2025-03-27 RX ORDER — LIDOCAINE HYDROCHLORIDE 20 MG/ML
JELLY TOPICAL AS NEEDED
Status: DISCONTINUED | OUTPATIENT
Start: 2025-03-27 | End: 2025-03-27 | Stop reason: HOSPADM

## 2025-03-27 RX ORDER — HYDROMORPHONE HYDROCHLORIDE 1 MG/ML
0.2 INJECTION, SOLUTION INTRAMUSCULAR; INTRAVENOUS; SUBCUTANEOUS EVERY 5 MIN PRN
Status: DISCONTINUED | OUTPATIENT
Start: 2025-03-27 | End: 2025-03-27 | Stop reason: HOSPADM

## 2025-03-27 RX ORDER — LIDOCAINE HYDROCHLORIDE 10 MG/ML
INJECTION, SOLUTION EPIDURAL; INFILTRATION; INTRACAUDAL; PERINEURAL AS NEEDED
Status: DISCONTINUED | OUTPATIENT
Start: 2025-03-27 | End: 2025-03-27 | Stop reason: SURG

## 2025-03-27 RX ADMIN — CEFAZOLIN SODIUM 2 G: 1 INJECTION, POWDER, FOR SOLUTION INTRAMUSCULAR; INTRAVENOUS at 17:54:00

## 2025-03-27 RX ADMIN — LIDOCAINE HYDROCHLORIDE 25 MG: 10 INJECTION, SOLUTION EPIDURAL; INFILTRATION; INTRACAUDAL; PERINEURAL at 17:50:00

## 2025-03-27 RX ADMIN — SODIUM CHLORIDE, SODIUM LACTATE, POTASSIUM CHLORIDE, CALCIUM CHLORIDE: 600; 310; 30; 20 INJECTION, SOLUTION INTRAVENOUS at 17:46:00

## 2025-03-27 RX ADMIN — MIDAZOLAM HYDROCHLORIDE 2 MG: 1 INJECTION INTRAMUSCULAR; INTRAVENOUS at 17:46:00

## 2025-03-27 RX ADMIN — DEXAMETHASONE SODIUM PHOSPHATE 8 MG: 4 MG/ML VIAL (ML) INJECTION at 18:11:00

## 2025-03-27 RX ADMIN — FUROSEMIDE 40 MG: 10 INJECTION INTRAMUSCULAR; INTRAVENOUS at 18:03:00

## 2025-03-27 RX ADMIN — ONDANSETRON 4 MG: 2 INJECTION INTRAMUSCULAR; INTRAVENOUS at 18:11:00

## 2025-03-27 RX ADMIN — DIPHENHYDRAMINE HYDROCHLORIDE 25 MG: 50 INJECTION, SOLUTION INTRAMUSCULAR; INTRAVENOUS at 18:07:00

## 2025-03-27 NOTE — PROGRESS NOTES
[facility]  Urology Progress Note    Austin Mcfarland Patient Status:  Observation    1990 MRN [id number]   Location [facility] 0SW-A Attending Manjit Wei*   Hosp Day # 0 PCP FRANK LOERA     Subjective:  Austin Mcfarland is a(n) 34 year old male.    Current complaints: Pain controlled.  Slight dysuria. No gross hematuria. Voiding ok.  No fever.     Objective:  General appearance: alert, appears stated age, and cooperative  Blood pressure 120/81, pulse 77, temperature 97.6 °F (36.4 °C), temperature source Oral, resp. rate 16, height 6' 1\" (1.854 m), weight 170 lb (77.1 kg), SpO2 99%.  Lungs: non-labored respirations  Abdomen: soft, non-tender  Lab Results   Component Value Date    WBC 4.8 2025    HGB 14.0 2025    HCT 40.2 2025    .0 2025    CREATSERUM 0.94 2025    BUN 9 2025     2025    K 4.3 2025     2025    CO2 29.0 2025     2025    CA 8.9 2025    ALB 5.0 2025    ALKPHO 80 2025    BILT 0.5 2025    TP 7.6 2025    AST 16 2025    ALT 14 2025    LIP 39 2025    MG 2.1 2025        CT ABDOMEN+PELVIS(CPT=74176)    Result Date: 3/26/2025  CONCLUSION:  Stable moderate to severe left hydronephrosis and dilatation of the proximal left ureter unchanged.  This is to the level of a surgical clip anterior to the psoas.  Findings concerning for stricture.  There is stable soft tissue stranding about the left renal pelvis and proximal left ureter.  Left nephrolithiasis without significant change.  Hyperdense cortical cyst right kidney..  LOCATION:  [id number]   Dictated by (CST): Concepción Crandall MD on 3/26/2025 at 11:21 AM     Finalized by (CST): Concepción Crandall MD on 3/26/2025 at 11:27 AM         Assessment:    History of left UPJ obstruction, hydronephrosis, recurrent ureteral stricture disease  S/P robotic assisted lap left ureterolysis with buccal mucosal graft  ureteroplasty, stent placement, 5/27/22     RECURRENT LEFT UROLITHIASIS  Patient underwent cystoscopy, left RGPG, left ureteral stent placement, left URS/laser lithotripsy 3/3/25 with Dr. Johnson  Findings: There was no dilation of ureter and the lumen appeared to be completely patent at the previous reconstruction site (with typical ballooning of the interposed graft); the kidney calyces were observed to be very dilated and hydronephrotic with a small lower pole filling defect  Stent removed by patient     ADMITTED WITH LEFT FLANK PAIN  Afebrile  UA does not appear infectious  Serum creat 1.05  Recent cystoscopy/URS 3/3/25: no bladder lesions/masses.  no dilation of ureter and the lumen appeared to be completely patent at the previous reconstruction site (with typical ballooning of the interposed graft); the kidney calyces were observed to be very dilated and hydronephrotic with a small lower pole filling defect, most likely signifying the stone.   Abdominal/pelvic CT scan without contrast 3/26/25: Moderate to severe left hydronephrosis and proximal left ureter to the level of the surgical clips anterior to the left psoas.  This appears unchanged.  The left ureter distal to this area is normal in caliber. There is no CT evidence for obstructing urinary calculus.  There are multiple calculi noted in the mid and inferior pole of the left kidney.  Right kidney is negative for nephrolithiasis and hydronephrosis.  There is a 6 mm hyperdense cortical cyst in the right kidney. There is soft tissue stranding about the left renal pelvis and proximal left ureter unchanged.     Plan:    NPO  Pain management  OR today for cystourethroscopy, left retrograde pyelogram, left ureteroscopy, left ureteral stent placement     Above discussed with patient, nurse, Dr. Johnson, Dr. Toth.      Monse Caldwell PA-C  [facility]  Department of Urology  3/27/2025  7:31 AM

## 2025-03-27 NOTE — PLAN OF CARE
Resumed care at 0730. Aox4, vitals stable. NPO for OR later tonight. C/o severe abdominal pain, IV pain meds given with some relief. Itching with opioid meds, given benadryl with relief. OOB to bathroom, steady gait. IVF running. Sent to OR around 1700, patient has all  belongings.

## 2025-03-27 NOTE — PLAN OF CARE
Received pt a/o x4. VSS. Afebrile. Reporting Lt flank pain, prn toradol & dilaudid given w/ improvement. IVF infusing. Medication admin per MAR. NPO at midnight for planned cysto w/ possible stent placement. Voiding, cloudy urine overnight. Call light within reach. Safety precautions in place.     Problem: PAIN - ADULT  Goal: Verbalizes/displays adequate comfort level or patient's stated pain goal  Description: INTERVENTIONS:- Encourage pt to monitor pain and request assistance- Assess pain using appropriate pain scale- Administer analgesics based on type and severity of pain and evaluate response- Implement non-pharmacological measures as appropriate and evaluate response- Consider cultural and social influences on pain and pain management- Manage/alleviate anxiety- Utilize distraction and/or relaxation techniques- Monitor for opioid side effects- Notify MD/LIP if interventions unsuccessful or patient reports new pain- Anticipate increased pain with activity and pre-medicate as appropriate  Outcome: Progressing

## 2025-03-27 NOTE — ANESTHESIA PROCEDURE NOTES
Airway  Date/Time: 3/27/2025 5:53 PM  Urgency: elective      General Information and Staff    Patient location during procedure: OR  Anesthesiologist: Kristen Thibodeaux MD  Performed: anesthesiologist   Performed by: Kristen Thibodeaux MD  Authorized by: Kristen Thibodeaux MD      Indications and Patient Condition  Indications for airway management: anesthesia  Spontaneous Ventilation: absent  Sedation level: deep  Preoxygenated: yes  Patient position: sniffing  Mask difficulty assessment: 1 - vent by mask    Final Airway Details  Final airway type: supraglottic airway      Successful airway: classic  Size 4       Number of attempts at approach: 1  Number of other approaches attempted: 0

## 2025-03-27 NOTE — ANESTHESIA POSTPROCEDURE EVALUATION
[facility]    Austin Mcfarland Patient Status:  Observation   Age/Gender 34 year old male MRN [id number]   Location [facility] SURGERY Attending Manjit Wei*   Hosp Day # 0 PCP FRANK LOERA       Anesthesia Post-op Note    CYSTOSCOPY, LEFT RETROGRADE PYELOGRAM, LEFTURETEROSCOPY, LEFT URETERAL STENT INSERTION    Procedure Summary       Date: 03/27/25 Room / Location:  MAIN OR 07 / [facility] MAIN OR    Anesthesia Start: 1746 Anesthesia Stop: 1854    Procedure: CYSTOSCOPY, LEFT RETROGRADE PYELOGRAM, LEFTURETEROSCOPY, LEFT URETERAL STENT INSERTION (Left: Ureter) Diagnosis: (LEFT FLANK PAIN, LEFT HYDRONEPHROSIS)    Surgeons: Quirino Toth MD Anesthesiologist: Kristen Thibodeaux MD    Anesthesia Type: general ASA Status: 2            Anesthesia Type: general    Vitals Value Taken Time   /83 03/27/25 1857   Temp 97.9 03/27/25 1857   Pulse 70 03/27/25 1857   Resp 15 03/27/25 1857   SpO2 100% 03/27/25 1857           Patient Location: PACU    Anesthesia Type: general    Airway Patency: patent and extubated    Postop Pain Control: adequate    Mental Status: mildly sedated but able to meaningfully participate in the post-anesthesia evaluation    Nausea/Vomiting: none    Cardiopulmonary/Hydration status: stable euvolemic    Complications: no apparent anesthesia related complications    Postop vital signs: stable    Dental Exam: Unchanged from Preop    Patient to be discharged from PACU when criteria met.

## 2025-03-27 NOTE — ANESTHESIA PREPROCEDURE EVALUATION
PRE-OP EVALUATION    Patient Name: Austin Mcfarland    Admit Diagnosis: Hydronephrosis, unspecified hydronephrosis type [N13.30]    Pre-op Diagnosis: LEFT FLANK PAIN, LEFT HYDRONEPHROSIS    CYSTOSCOPY, LEFT RETROGRADE PYELOGRAM, LEFTURETEROSCOPY, LEFT URETERAL STENT INSERTION    Anesthesia Procedure: CYSTOSCOPY, LEFT RETROGRADE PYELOGRAM, LEFTURETEROSCOPY, LEFT URETERAL STENT INSERTION (Left)    Surgeons and Role:     * Quirino Toth MD - Primary    Pre-op vitals reviewed.  Temp: 98.1 °F (36.7 °C)  Pulse: 61  Resp: 16  BP: 124/86  SpO2: 96 %  Body mass index is 22.43 kg/m².    Current medications reviewed.  Hospital Medications:   [COMPLETED] ondansetron (Zofran) 4 MG/2ML injection 4 mg  4 mg Intravenous Once    [COMPLETED] ketorolac (Toradol) 15 MG/ML injection 15 mg  15 mg Intravenous Once    [COMPLETED] diphenhydrAMINE (Benadryl) cap/tab 25 mg  25 mg Oral Once    [COMPLETED] HYDROmorphone (Dilaudid) 1 MG/ML injection 0.5 mg  0.5 mg Intravenous Once    [COMPLETED] sodium chloride 0.9 % IV bolus 1,000 mL  1,000 mL Intravenous Once    [COMPLETED] HYDROmorphone (Dilaudid) 1 MG/ML injection 0.5 mg  0.5 mg Intravenous Once    sodium chloride 0.9% infusion   Intravenous Continuous    acetaminophen (Tylenol Extra Strength) tab 500 mg  500 mg Oral Q4H PRN    melatonin tab 3 mg  3 mg Oral Nightly PRN    polyethylene glycol (PEG 3350) (Miralax) 17 g oral packet 17 g  17 g Oral Daily PRN    sennosides (Senokot) tab 17.2 mg  17.2 mg Oral Nightly PRN    bisacodyl (Dulcolax) 10 MG rectal suppository 10 mg  10 mg Rectal Daily PRN    ondansetron (Zofran) 4 MG/2ML injection 8 mg  8 mg Intravenous Q6H PRN    benzonatate (Tessalon) cap 200 mg  200 mg Oral TID PRN    glycerin-hypromellose- (Artificial Tears) 0.2-0.2-1 % ophthalmic solution 1 drop  1 drop Both Eyes QID PRN    sodium chloride (Saline Mist) 0.65 % nasal solution 1 spray  1 spray Each Nare Q3H PRN    ketorolac (Toradol) 15 MG/ML injection 15 mg  15 mg  Intravenous Q6H PRN    HYDROmorphone (Dilaudid) 1 MG/ML injection 0.2 mg  0.2 mg Intravenous Q2H PRN    Or    HYDROmorphone (Dilaudid) 1 MG/ML injection 0.4 mg  0.4 mg Intravenous Q2H PRN    Or    HYDROmorphone (Dilaudid) 1 MG/ML injection 0.8 mg  0.8 mg Intravenous Q2H PRN    diphenhydrAMINE (Benadryl) 50 mg/mL  injection 25 mg  25 mg Intravenous Q4H PRN    [COMPLETED] HYDROmorphone (Dilaudid) 1 MG/ML injection 0.5 mg  0.5 mg Intravenous Once    ceFAZolin (Ancef) 2g in 10mL IV syringe premix  2 g Intravenous On Call to OR       Outpatient Medications:   Prescriptions Prior to Admission[1]    Allergies: Morphine, Dilaudid [hydromorphone], and Fentanyl      Anesthesia Evaluation    Patient summary reviewed.    Anesthetic Complications  (-) history of anesthetic complications         GI/Hepatic/Renal             (+) chronic renal disease                    Cardiovascular    Negative cardiovascular ROS.                                                   Endo/Other    Negative endo/other ROS.                              Pulmonary    Negative pulmonary ROS.                       Neuro/Psych      (+) depression  (+) anxiety                      Kidney stones        Past Surgical History:   Procedure Laterality Date    Cystoscopy,+ureteroscopy Left 7/31/10    s/p left rpg, left urs, left renoscopy, cysto, with stent placement 7-31-10 at [facility], Dr John Sheffield    Cystoscopy,+ureteroscopy Left 12/3/10    L URS, Dr. Sheffield    Cystoscopy,+ureteroscopy Left     Cysto, URS, RPG, stent Bladder Stone removal-Dr Wilson    Cystoscopy,+ureteroscopy Left 3/19/15    no stent placed, [facility] DIONY    Cystoscopy,insert ureteral stent  4/22/2014    Procedure: LITHOTRIPSY WITH CYSTOSCOPY, STENT PLACEMENT;  Surgeon: Dex Jane DO;  Location: [facility], [facility]    Cystoscopy,remv calculus,simple  12/27/07    Performed by JOHN SHEFFIELD at [facility]    Cystoscopy,remv calculus,simple  5/23/08    Performed by JOHN SHEFFIELD at  [facility], [facility]    Cystourethroscopy Left 12/27/10    cysto stent removal- Dr. Calderon    Cystourethroscopy Left 12/19/14    Cysto Stent Removal-Dr Jane    Cystourethroscopy,ureter catheter  3/10/08    Performed by DEBORAH CALDERON at [facility], [facility]    Cystourethroscopy,ureter catheter  4/22/2014    Procedure: LITHOTRIPSY WITH CYSTOSCOPY, STENT PLACEMENT;  Surgeon: Dex Jane DO;  Location: [facility]    Fragmenting of kidney stone  9/25/07    Performed by DEBORAH CALDERON at [facility], [facility]    Fragmenting of kidney stone  9/25/07    Performed by DEBORAH CALDERON at [facility], [facility]    Fragmenting of kidney stone  4/22/2014    Procedure: LITHOTRIPSY WITH CYSTOSCOPY, STENT PLACEMENT;  Surgeon: Dex Jane DO;  Location: [facility]    Ir nephhrostomy tube  2007    Cysto stent removal, nephrostomy tube placement    Laparoscopy, surgical; pyeloplasty  Oct 11, 2007    Left UPJ repair    Lithotripsy Left June 21, 2007    Left ESWL    Other surgical history  10/8/16    Cysto, Lt RPG, Lt URS & Nephroscopy, Stone Manipulation, Stone Basket & Removal, Stent Placement-Dr. Calderon     Other surgical history      left pcnl cdh    Other surgical history      stent placement cdh    Other surgical history  12/5/16    cysto stent removal dr calderon    Other surgical history  12/11/2020    Cysto/stent removal Dr. Su    Other surgical history  06/25/2021    CYSTOSCOPY, LEFT DIAGNOSTIC URETEROSCOPY, LEFT RETROGRADE PYELOGRAM, LEFT URETERAL STENT PLACEMENT,    Other surgical history  06/11/2021    CYSTOSCOPY,LEFT URETEROSCOPY,, RETROGRADE PYELOGRAM, LEFT STENT INSERTION    Other surgical history  01/21/2022    Cysto, Lt URS, RPHG, LL, Stone Extraction, Lt Stent Placement - Dr. Steel    Other surgical history  02/03/2022    Cysto Stent Removal- Dr. Steel    Renal endoscopy  3/10/08    Performed by DEBORAH CALDERON at [facility]    Special service or  report  May 9, 2007    Cysto, stone extraction    Special service or report  Sept. 20, 2006    Neph tube insertion with endopyelotomy    Special service or report  Oct 21, 2007    Larger ureteral stent placed    Urology surgery procedure unlisted  5/23/08    Performed by DEBORAH KOHLI at [facility], [facility]    X-ray antegrade pyelogram tube  3/10/08    Performed by DEBORAH KOHLI at [facility], [facility]    X-ray retrograde pyelogram  3/10/08    Performed by DEBORAH KOHLI at [facility], [facility]    X-ray retrograde pyelogram  5/23/08    Performed by DEBORAH KOHLI at [facility], [facility]    X-ray retrograde pyelogram  4/22/2014    Procedure: LITHOTRIPSY WITH CYSTOSCOPY, STENT PLACEMENT;  Surgeon: Dex Jane DO;  Location: [facility], [facility]     Social History     Socioeconomic History    Marital status: Single   Tobacco Use    Smoking status: Never    Smokeless tobacco: Never   Vaping Use    Vaping status: Never Used   Substance and Sexual Activity    Alcohol use: Yes     Comment: ocassionally    Drug use: Yes     Frequency: 7.0 times per week     Types: Cannabis     Comment: medical     History   Drug Use    Frequency: 7.0 times per week    Types: Cannabis     Comment: medical     Available pre-op labs reviewed.  Lab Results   Component Value Date    WBC 4.8 03/27/2025    RBC 4.50 03/27/2025    HGB 14.0 03/27/2025    HCT 40.2 03/27/2025    MCV 89.3 03/27/2025    MCH 31.1 03/27/2025    MCHC 34.8 03/27/2025    RDW 12.2 03/27/2025    .0 03/27/2025     Lab Results   Component Value Date     03/27/2025    K 4.3 03/27/2025     03/27/2025    CO2 29.0 03/27/2025    BUN 9 03/27/2025    CREATSERUM 0.94 03/27/2025     (H) 03/27/2025    CA 8.9 03/27/2025            Airway      Mallampati: I  Mouth opening: >3 FB  TM distance: 4 - 6 cm  Neck ROM: full Cardiovascular      Rhythm: regular  Rate: normal     Dental             Pulmonary    Pulmonary exam  normal.                 Other findings              ASA: 2   Plan: general  NPO status verified and           Plan/risks discussed with: patient  Use of blood product(s) discussed with: patient    Consented to blood products.          Present on Admission:  **None**             [1]   Medications Prior to Admission   Medication Sig Dispense Refill Last Dose/Taking    Ketorolac Tromethamine 10 MG Oral Tab Take 1 tablet (10 mg total) by mouth every 8 (eight) hours as needed for Pain.   3/26/2025 at  3:30 AM    ondansetron 4 MG Oral Tablet Dispersible Take 1 tablet (4 mg total) by mouth every 8 (eight) hours as needed for Nausea.   3/26/2025 at  3:00 AM    acetaminophen 500 MG Oral Tab Take 1-2 tablets (500-1,000 mg total) by mouth every 4 (four) hours as needed for Pain.   3/26/2025 at  6:00 AM

## 2025-03-27 NOTE — PROGRESS NOTES
[facility]   part of [facility]     Hospitalist Progress Note     Austin Mcfarland Patient Status:  Observation    1990 MRN [id number]   Location [facility] 0SW-A Attending Manjit Wei*   Hosp Day # 0 PCP FRANK LOERA     Chief Complaint: Flank pain    Subjective:     Patient seen examined bedside.  Patient had no overnight events pain well-controlled.    Objective:    Review of Systems:   A comprehensive review of systems was completed; pertinent positive and negatives stated in subjective.    Vital signs:  Temp:  [97.8 °F (36.6 °C)-97.9 °F (36.6 °C)] 97.9 °F (36.6 °C)  Pulse:  [64-82] 76  Resp:  [18] 18  BP: (120-128)/(74-87) 128/85  SpO2:  [96 %-98 %] 96 %    Physical Exam:    General: No acute distress  Respiratory: No wheezes, no rhonchi  Cardiovascular: S1, S2, regular rate and rhythm  Abdomen: Soft, Non-tender, non-distended, positive bowel sounds  Neuro: No new focal deficits.   Extremities: No edema      Diagnostic Data:    Labs:  Recent Labs   Lab 25  0902   WBC 4.9   HGB 15.4   MCV 89.3   .0       Recent Labs   Lab 25  0902   *   BUN 14   CREATSERUM 1.05   CA 9.7   ALB 5.0*      K 4.2      CO2 30.0   ALKPHO 80   AST 16   ALT 14   BILT 0.5   TP 7.6       Estimated Glomerular Filtration Rate: 96 mL/min/1.73m2 (result from lab).    No results for input(s): \"TROP\", \"TROPHS\", \"CK\" in the last 168 hours.    No results for input(s): \"PTP\", \"INR\" in the last 168 hours.         Microbiology    No results found for this visit on 25.      Imaging: Reviewed in Epic.    Medications:    [START ON 3/27/2025] ceFAZolin  2 g Intravenous On Call to OR       Assessment & Plan:      # Hydronephrosis, left sided and severe; probably stricture related.     -NPO  -fluids   prn pain meds  - urology consult   -Cystourethroscopy with left retrograde pyelogram with left ureteroscopy and left ureteral stent placement today.        Manjit Wei,  DO    Supplementary Documentation:     Quality:  DVT Mechanical Prophylaxis:   SCDs,    DVT Pharmacologic Prophylaxis   Medication   None                Code Status: Not on file  Michaels: No urinary catheter in place  Michaels Duration (in days):   Central line:    BRANDIE:     Discharge is dependent on: Clinical improvement  At this point Mr. Mcfarland is expected to be discharge to: Home    The 21st Century Cures Act makes medical notes like these available to patients in the interest of transparency. Please be advised this is a medical document. Medical documents are intended to carry relevant information, facts as evident, and the clinical opinion of the practitioner. The medical note is intended as peer to peer communication and may appear blunt or direct. It is written in medical language and may contain abbreviations or verbiage that are unfamiliar.

## 2025-03-27 NOTE — INTERVAL H&P NOTE
Pre-op Diagnosis: LEFT FLANK PAIN, LEFT HYDRONEPHROSIS    The above referenced H&P was reviewed by Quirino Toth MD on 3/27/2025, the patient was examined and no significant changes have occurred in the patient's condition since the H&P was performed.  I discussed with the patient and/or legal representative the potential benefits, risks and side effects of this procedure; the likelihood of the patient achieving goals; and potential problems that might occur during recuperation.  I discussed reasonable alternatives to the procedure, including risks, benefits and side effects related to the alternatives and risks related to not receiving this procedure.  We will proceed with procedure as planned.      Plan  for Left URS  and Give Lasix intra-op  and RPG  and will leave  a JJ stent in an no string

## 2025-03-27 NOTE — OPERATIVE REPORT
[facility]   part of [facility]    Operative Note         Austin Mcfarland Location: OR   [facility] [id number] MRN [id number]   Admission Date 3/26/2025 Operation Date 3/27/2025   Attending Physician Manjit Wei*       Patient Name: Austin Mcfarland     Preoperative Diagnosis: LEFT FLANK PAIN, LEFT HYDRONEPHROSIS     Postoperative Diagnosis: LEFT FLANK PAIN, LEFT HYDRONEPHROSIS   LEFT RETROPERITONEAL FIBROSIS    Procedure(s):  CYSTOSCOPY, LEFT RETROGRADE PYELOGRAM, LEFTURETEROSCOPY, LEFT URETERAL STENT INSERTION   RENAL WASHOUT STUDY    Primary Surgeon: Quirino Toth MD           Anesthesia: General     Specimen: * No specimens in log *     Estimated Blood Loss: No data recorded     Complications: none      Indications for procedure: [place] and Pain      Surgical Findings: I  think  this is a stand pipe  effect  and thus from RPF      Complexity: LOW   (optional)    Operative Summary:      The patient as well as identified any operating room table.  He was draped and prepped in usual sterile fashion lithotomy position.  All pressure points were padded.  He underwent a standard timeout and received IV Ancef and Venodyne's within our surgery.  Rigid cystoscopy noted not nonobstructing urethra and prostate.  Ureteral orifice ease were normal in number size and shape.  The left ureteral orifice was actually bit gaping.  We initially put placed 5 [place] ureteral catheter up into the kidney and then went very easily and performed pullout retrograde urogram.  This noted calyces to be blunted and somewhat dilated but the ureteropelvic junction was wide open.  We then had given him 40 mg of IV Lasix at the start of the case and we sat on the table with the head up for 10 minutes.  The renal pelvis did not drain well and even doing pullout retrograde the ureter looked very stiff and did not dilate head all and seemed fixed in position.  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  We then exchanged exchanged and placed a guidewire up into the kidney and looked up with a flexible digital ureteroscope we looked in upper lower middle pole the calyces again were somewhat dilated and in the lower pole there was a ton of dust from previous ureteroscopy with small 1 to 2 mm fragments.  We used a single action pump to wash all the stones out from the lower pole moiety into the renal pelvis.  On looking down beyond the UPJ we could see on the lateral posterior aspect buccal graft.  You could see the papilla very nicely and the ureter was open again looking down through the ureter the ureter seemed stiff and but wide open consistent with retroperitoneal fibrosis.  We exchanged the guidewire and on over cystoscope to place a 6 x 28 double-J's stent good curls in the kidney and bladder noted bladder was drained the patient was transferred to the recovery room in stable condition.    Implants:   Implant Name Type Inv. Item Serial No.  Lot No. LRB No. Used Action   SET URET STNT 6FR L28CM AXXCESS CATH 6FR SFT - SNA  SET URET STNT 6FR L28CM AXXCESS CATH 6FR SFT NA [employer] WD [id number] Left 1 Implanted        Drains:  6  x  28      Condition: Stable        Quirino Toth MD

## 2025-03-28 VITALS
HEIGHT: 73 IN | TEMPERATURE: 98 F | WEIGHT: 170 LBS | DIASTOLIC BLOOD PRESSURE: 61 MMHG | RESPIRATION RATE: 20 BRPM | HEART RATE: 75 BPM | BODY MASS INDEX: 22.53 KG/M2 | OXYGEN SATURATION: 85 % | SYSTOLIC BLOOD PRESSURE: 123 MMHG

## 2025-03-28 PROCEDURE — 99239 HOSP IP/OBS DSCHRG MGMT >30: CPT | Performed by: HOSPITALIST

## 2025-03-28 RX ORDER — DIPHENHYDRAMINE HCL 25 MG
25 CAPSULE ORAL EVERY 6 HOURS PRN
Status: DISCONTINUED | OUTPATIENT
Start: 2025-03-28 | End: 2025-03-28

## 2025-03-28 RX ORDER — HYDROCODONE BITARTRATE AND ACETAMINOPHEN 5; 325 MG/1; MG/1
2 TABLET ORAL EVERY 4 HOURS PRN
Status: DISCONTINUED | OUTPATIENT
Start: 2025-03-28 | End: 2025-03-28

## 2025-03-28 RX ORDER — HYDROCODONE BITARTRATE AND ACETAMINOPHEN 5; 325 MG/1; MG/1
1 TABLET ORAL EVERY 6 HOURS PRN
Qty: 5 TABLET | Refills: 0 | Status: SHIPPED | OUTPATIENT
Start: 2025-03-28

## 2025-03-28 RX ORDER — HYDROCODONE BITARTRATE AND ACETAMINOPHEN 5; 325 MG/1; MG/1
1 TABLET ORAL EVERY 4 HOURS PRN
Status: DISCONTINUED | OUTPATIENT
Start: 2025-03-28 | End: 2025-03-28

## 2025-03-28 NOTE — PLAN OF CARE
Assumed care of pt from PACU at 2015. Pt a/o x4. VSS. Afebrile. Reporting Lt flank pain w/ urination, prn given w/ improvement. Benadryl given for itching. IVF infusing. Tolerating diet. Voiding w/o complication. Call light within reach. Safety precautions in place.     Problem: PAIN - ADULT  Goal: Verbalizes/displays adequate comfort level or patient's stated pain goal  Description: INTERVENTIONS:- Encourage pt to monitor pain and request assistance- Assess pain using appropriate pain scale- Administer analgesics based on type and severity of pain and evaluate response- Implement non-pharmacological measures as appropriate and evaluate response- Consider cultural and social influences on pain and pain management- Manage/alleviate anxiety- Utilize distraction and/or relaxation techniques- Monitor for opioid side effects- Notify MD/LIP if interventions unsuccessful or patient reports new pain- Anticipate increased pain with activity and pre-medicate as appropriate  Outcome: Progressing

## 2025-03-28 NOTE — PROGRESS NOTES
[facility]  Urology Progress Note    Austin Mcfarland Patient Status:  Outpatient in a Bed    1990 MRN [id number]   Location [facility] 4NW-A Attending Manjit Wei*   Hosp Day # 0 PCP FRANK LOERA     Subjective:  Austin Mcfarland is a(n) 34 year old male.    S/P cystoscopy, left RGPG, left URS, left ureteral stent placement, renal washout study 3/27/25 with Dr. Toth    Current complaints: Feeling better. Some pain that radiated towards kidney with urination - better this AM. No dysuria.  +hematuria, urinary frequency.  No nausea, vomiting, fever, or chills.      Objective:  General appearance: alert, appears stated age, and cooperative  Blood pressure 109/60, pulse 68, temperature 98.3 °F (36.8 °C), temperature source Oral, resp. rate 15, height 6' 1\" (1.854 m), weight 170 lb (77.1 kg), SpO2 96%.  Lungs: non-labored respirations  Abdomen: soft, non-tender  No flank tenderness.    Lab Results   Component Value Date    ESRML 6 2025         XR OR - N/C    Result Date: 3/27/2025  CONCLUSION:  Please see the procedure/operative report for further details.    LOCATION:  [place]    Dictated by (CST): Stromberg, LeRoy, MD on 3/27/2025 at 7:25 PM     Finalized by (CST): Stromberg, LeRoy, MD on 3/27/2025 at 7:25 PM       CT ABDOMEN+PELVIS(CPT=74176)    Result Date: 3/26/2025  CONCLUSION:  Stable moderate to severe left hydronephrosis and dilatation of the proximal left ureter unchanged.  This is to the level of a surgical clip anterior to the psoas.  Findings concerning for stricture.  There is stable soft tissue stranding about the left renal pelvis and proximal left ureter.  Left nephrolithiasis without significant change.  Hyperdense cortical cyst right kidney..  LOCATION:  [id number]   Dictated by (CST): Concepción Crandall MD on 3/26/2025 at 11:21 AM     Finalized by (CST): Concepción Crandall MD on 3/26/2025 at 11:27 AM         Assessment:    History of left UPJ obstruction, hydronephrosis,  recurrent ureteral stricture disease  S/P robotic assisted lap left ureterolysis with buccal mucosal graft ureteroplasty, stent placement, 5/27/22     RECURRENT LEFT UROLITHIASIS  Patient underwent cystoscopy, left RGPG, left ureteral stent placement, left URS/laser lithotripsy 3/3/25 with Dr. Johnson  Findings: There was no dilation of ureter and the lumen appeared to be completely patent at the previous reconstruction site (with typical ballooning of the interposed graft); the kidney calyces were observed to be very dilated and hydronephrotic with a small lower pole filling defect  Stent removed by patient    LEFT FLANK PAIN, LEFT HYDRONEPHROSIS  S/P cystoscopy, left RGPG, left URS, left ureteral stent placement, renal washout study 3/27/25 with Dr. Toth  Surgical Findings: Dr. Toth thinks this is a stand pipe  effect  and thus from RPF    Afebrile, VSS  UA does not appear infectious  Serum creat 1.05 > 0.94    Plan:    Ureteral stent related symptoms reviewed with patient  Pain management    Ok for discharge today from urology standpoint if pain controlled.    Patient to follow-up with Dr. Johnson in 2 weeks - discuss disposition of ureteral stent at that time. TE sent through [facility] system to help coordinate appt.    Patient reminded the ureteral stent is not a permament implant and would need to be removed or exchanged within 3 months.  Patient agrees and understands.    Above discussed with patient, nurse, Dr. Toth.    JESS [facility]  Department of Urology  3/28/2025  6:46 AM

## 2025-03-28 NOTE — DISCHARGE INSTRUCTIONS
Having a Ureteral Stent  A ureteral stent is a soft plastic tube with holes in it. It’s temporarily inserted into a ureter to help drain urine into the bladder. One end goes in the kidney. The other end goes in the bladder. A coil on each end holds the stent in place. The stent can’t be seen from outside the body. It shouldn’t interfere with your normal routine. Your stent will be put in by a doctor trained in treating the urinary tract (a urologist) or another specialist. The procedure is done in a hospital or surgery center. You’ll likely go home the same day.     THE URETERAL STENT IS NOT A PERMAMENT IMPLANT - WILL NEED TO DETERMINE DISPOSITION OF THE URETERAL STENT AT FOLLOW-UP APPOINTMENT WITH DR. FERNÁNDEZ    When is a ureteral stent used?  A ureteral stent may be used:  To bypass a blockage in a kidney or ureter.  During kidney stone removal.  To let a ureter heal after surgery.    Before the procedure  Your healthcare provider will give you instructions to prepare for the procedure. X-rays or other imaging tests of your kidneys and ureters may be done beforehand.   During the procedure  You receive medicine to prevent pain and help you relax or sleep during the procedure. Once this takes effect, the procedure starts.  The doctor inserts a cystoscope (lighted instrument) through the urethra and into the bladder. This shows the opening to the ureter.  A thin wire is carefully threaded through the cystoscope, up the ureter, and into the kidney. The stent is inserted over the wire.  A fluoroscope (special X-ray machine) is used to help position the stent. When the stent is in place, the wire and cystoscope are removed.     While you have a stent  Some discomfort is normal. Certain movements may trigger pain or a feeling that you need to urinate. You may also feel mild soreness or pressure before or during urination. These symptoms will go away a few days after the stent is removed.  Medicine to control pain or  bladder spasms or to prevent infection may be prescribed. Take this as directed.  Drink plenty of fluids to help flush out your urinary tract.  Your urine may be slightly pink or red. This is due to bleeding caused by minor irritation from the stent. This may happen on and off while you have the stent.  As with any synthetic device placed in the body, there is a risk of infection. The stent may have to be removed if this happens.      How long will you need a stent?  The stent is often taken out after the blockage in the ureter is treated or the ureter has healed. This may take 1 week to 2 weeks, or longer. If a stent is needed for a long time, it may need to be changed every few months.   When to call your healthcare provider  Contact your healthcare provider right away if:  Your urine contains blood clots or you see a large amount of blood-tinged urine  You have symptoms similar to those you had before the stent was placed  You constantly leak urine  You have a fever over 100.4°F (38°C), or as advised by your health care provider  You have chills  You experience nausea or vomiting  Your pain is not relieved with medicine  The end of the stent comes out of the urethra  You experience new or worsening symptoms  Katerine last reviewed this educational content on 4/1/2020  © 1800-7007 [employer]. [address] [place] [address], [place] [address]. All rights reserved. This information is not intended as a substitute for professional medical care. Always follow your healthcare professional's instructions.

## 2025-03-28 NOTE — DISCHARGE PLANNING
NURSING DISCHARGE NOTE    Discharged Home via Ambulatory.  Accompanied by RN  Belongings Taken by patient/family.      Patient is alert and oriented x4. Room air. Medications given per MAR. Went over discharge instructions. No questions at this time. Patient left with discharge paperwork and personal belongings.

## 2025-03-28 NOTE — PROGRESS NOTES
[facility]   part of [facility]     Hospitalist Progress Note     Austin Mcfarland Patient Status:  Observation    1990 MRN [id number]   Location [facility] 0SW-A Attending Manjit Wei*   Hosp Day # 0 PCP FRANK LOERA     Chief Complaint: Flank pain    Subjective:     Patient seen examined bedside.  Patient having some pain this am.    Objective:    Review of Systems:   A comprehensive review of systems was completed; pertinent positive and negatives stated in subjective.    Vital signs:  Temp:  [97.4 °F (36.3 °C)-98.3 °F (36.8 °C)] 98.3 °F (36.8 °C)  Pulse:  [55-88] 68  Resp:  [8-18] 15  BP: (108-127)/(60-95) 109/60  SpO2:  [93 %-100 %] 96 %    Physical Exam:    General: No acute distress  Respiratory: No wheezes, no rhonchi  Cardiovascular: S1, S2, regular rate and rhythm  Abdomen: Soft, Non-tender, non-distended, positive bowel sounds  Neuro: No new focal deficits.   Extremities: No edema      Diagnostic Data:    Labs:  Recent Labs   Lab 25  0902 25  0635   WBC 4.9 4.8   HGB 15.4 14.0   MCV 89.3 89.3   .0 173.0       Recent Labs   Lab 25  0902 25  0635   * 103*   BUN 14 9   CREATSERUM 1.05 0.94   CA 9.7 8.9   ALB 5.0*  --     140   K 4.2 4.3    106   CO2 30.0 29.0   ALKPHO 80  --    AST 16  --    ALT 14  --    BILT 0.5  --    TP 7.6  --        Estimated Glomerular Filtration Rate: 110 mL/min/1.73m2 (result from lab).    No results for input(s): \"TROP\", \"TROPHS\", \"CK\" in the last 168 hours.    No results for input(s): \"PTP\", \"INR\" in the last 168 hours.         Microbiology    No results found for this visit on 25.      Imaging: Reviewed in Epic.    Medications:         Assessment & Plan:      # Hydronephrosis, left sided and severe; probably stricture related.     -NPO  -fluids   prn pain meds  - urology consult   -Cystourethroscopy with left retrograde pyelogram with left ureteroscopy and left ureteral stent placement  yesterday. Stent placed        Manjit Wei,     Supplementary Documentation:     Quality:  DVT Mechanical Prophylaxis:   SCDs,    DVT Pharmacologic Prophylaxis   Medication   None                Code Status: Not on file  Michaels: No urinary catheter in place  Michaels Duration (in days):   Central line:    BRANDIE:     Discharge is dependent on: Clinical improvement  At this point Mr. Mcfarland is expected to be discharge to: Home    The 21st Century Cures Act makes medical notes like these available to patients in the interest of transparency. Please be advised this is a medical document. Medical documents are intended to carry relevant information, facts as evident, and the clinical opinion of the practitioner. The medical note is intended as peer to peer communication and may appear blunt or direct. It is written in medical language and may contain abbreviations or verbiage that are unfamiliar.

## 2025-03-29 PROBLEM — K68.2 RETROPERITONEAL FIBROSIS: Status: ACTIVE | Noted: 2025-03-29

## 2025-03-29 NOTE — DISCHARGE SUMMARY
[facility]     Austin Mcfarland Patient Status:  Observation    1990 MRN [id number]   Location [facility] 4NW-A Attending No att. providers found   Hosp Day # 0 PCP FRANK LOERA     Date of Admission: 3/26/2025  Date of Discharge:  3/28/2025     Discharge Disposition: Home or Self Care    Discharge Diagnosis:  1.  Hydronephrosis  2.  Retroperitoneal fibrosis    History of Present Illness: Austin Mcfarland is a 34 year old male with onset of left flank pain around 4am today, sharp in nature and mostly left flank with mild radiation into left abdomen.  Mild dysuria.  No fevers or vomiting or diarrhea.  Says he has had hx of kidney stones and ureteral strictures and multiple urological procedures.     Brief Synopsis:     # Hydronephrosis, left sided and severe; probably stricture related.     -NPO  -fluids   prn pain meds  - urology consult   -Cystourethroscopy with left retrograde pyelogram with left ureteroscopy and left ureteral stent placement yesterday. Stent placed  Lace+ Score: 57  59-90 High Risk  29-58 Medium Risk  0-28   Low Risk       TCM Follow-Up Recommendation:  LACE 29-58: Moderate Risk of readmission after discharge from the hospital.      Procedures during hospitalization:   Cystourethroscopy with left retrograde pyelogram with left ureteroscopy and left ureteral stent placement     Incidental or significant findings and recommendations (brief descriptions):  See urology's full report    Lab/Test results pending at Discharge:   N/A    Consultants:  Urology-Dr. Toth    Discharge Medication List:     Discharge Medications        START taking these medications        Instructions Prescription details   HYDROcodone-acetaminophen 5-325 MG Tabs  Commonly known as: Norco      Take 1 tablet by mouth every 6 (six) hours as needed for Pain.   Quantity: 5 tablet  Refills: 0            CONTINUE taking these medications        Instructions Prescription details   acetaminophen  500 MG Tabs  Commonly known as: Tylenol Extra Strength      Take 1-2 tablets (500-1,000 mg total) by mouth every 4 (four) hours as needed for Pain.   Refills: 0     Ketorolac Tromethamine 10 MG Tabs  Commonly known as: TORADOL      Take 1 tablet (10 mg total) by mouth every 8 (eight) hours as needed for Pain.   Refills: 0     ondansetron 4 MG Tbdp  Commonly known as: Zofran-ODT      Take 1 tablet (4 mg total) by mouth every 8 (eight) hours as needed for Nausea.   Refills: 0               Where to Get Your Medications        These medications were sent to [id number] - [place], [place] - [address] AT [facility], [phone], [phone] [id number] [facility] [address]      Phone: [phone]   HYDROcodone-acetaminophen 5-325 MG Tabs         [place]    Follow-up appointment:   Charlene Johnson MD  [address] [place] [address]  [address]  [facility] [id number] [phone]    Schedule an appointment as soon as possible for a visit in 2 week(s)      No Mendoza  [phone]    Follow up in 1 week(s)      Appointments for Next 30 Days 3/29/2025 - 2025      None            Vital signs:       Physical Exam:    General: No acute distress   Lungs: clear to auscultation  Cardiovascular: S1, S2  Abdomen: Soft      -----------------------------------------------------------------------------------------------  PATIENT DISCHARGE INSTRUCTIONS: See electronic chart    Manjit Wei, DO    Total time spent on discharge plannin minutes     The  Cures Act makes medical notes like these available to patients in the interest of transparency. Please be advised this is a medical document. Medical documents are intended to carry relevant information, facts as evident, and the clinical opinion of the practitioner. The medical note is intended as peer to peer communication and may appear blunt or direct. It is written in medical language and may contain  abbreviations or verbiage that are unfamiliar.

## 2025-05-15 ENCOUNTER — APPOINTMENT (OUTPATIENT)
Dept: ADMINISTRATIVE | Facility: HOSPITAL | Age: 35
End: 2025-05-15
Payer: COMMERCIAL

## 2025-05-15 RX ORDER — PHENAZOPYRIDINE HYDROCHLORIDE 200 MG/1
200 TABLET, FILM COATED ORAL DAILY PRN
COMMUNITY
End: 2025-05-24

## 2025-05-19 ENCOUNTER — LABORATORY ENCOUNTER (OUTPATIENT)
Dept: LAB | Age: 35
End: 2025-05-19
Attending: UROLOGY
Payer: COMMERCIAL

## 2025-05-19 DIAGNOSIS — N13.30 HYDRONEPHROSIS, UNSPECIFIED HYDRONEPHROSIS TYPE: ICD-10-CM

## 2025-05-19 LAB
ANTIBODY SCREEN: NEGATIVE
RH BLOOD TYPE: POSITIVE

## 2025-05-19 PROCEDURE — 86901 BLOOD TYPING SEROLOGIC RH(D): CPT

## 2025-05-19 PROCEDURE — 86850 RBC ANTIBODY SCREEN: CPT

## 2025-05-19 PROCEDURE — 86900 BLOOD TYPING SEROLOGIC ABO: CPT

## 2025-05-19 PROCEDURE — 36415 COLL VENOUS BLD VENIPUNCTURE: CPT

## 2025-05-23 ENCOUNTER — HOSPITAL ENCOUNTER (OUTPATIENT)
Facility: HOSPITAL | Age: 35
Discharge: HOME | End: 2025-05-24
Attending: UROLOGY | Admitting: UROLOGY
Payer: COMMERCIAL

## 2025-05-23 ENCOUNTER — ANESTHESIA (OUTPATIENT)
Dept: SURGERY | Facility: HOSPITAL | Age: 35
End: 2025-05-23
Payer: COMMERCIAL

## 2025-05-23 ENCOUNTER — ANESTHESIA EVENT (OUTPATIENT)
Dept: SURGERY | Facility: HOSPITAL | Age: 35
End: 2025-05-23
Payer: COMMERCIAL

## 2025-05-23 DIAGNOSIS — K68.2 RETROPERITONEAL FIBROSIS: ICD-10-CM

## 2025-05-23 DIAGNOSIS — N13.30 HYDRONEPHROSIS, UNSPECIFIED HYDRONEPHROSIS TYPE: Primary | ICD-10-CM

## 2025-05-23 PROCEDURE — 76942 ECHO GUIDE FOR BIOPSY: CPT | Performed by: ANESTHESIOLOGY

## 2025-05-23 RX ORDER — ACETAMINOPHEN 500 MG
1000 TABLET ORAL ONCE
Status: DISCONTINUED | OUTPATIENT
Start: 2025-05-23 | End: 2025-05-23 | Stop reason: HOSPADM

## 2025-05-23 RX ORDER — PROCHLORPERAZINE EDISYLATE 5 MG/ML
5 INJECTION INTRAMUSCULAR; INTRAVENOUS EVERY 8 HOURS PRN
Status: DISCONTINUED | OUTPATIENT
Start: 2025-05-23 | End: 2025-05-24

## 2025-05-23 RX ORDER — HEPARIN SODIUM 5000 [USP'U]/ML
5000 INJECTION, SOLUTION INTRAVENOUS; SUBCUTANEOUS ONCE
Status: COMPLETED | OUTPATIENT
Start: 2025-05-23 | End: 2025-05-23

## 2025-05-23 RX ORDER — HYDROMORPHONE HYDROCHLORIDE 1 MG/ML
INJECTION, SOLUTION INTRAMUSCULAR; INTRAVENOUS; SUBCUTANEOUS
Status: COMPLETED
Start: 2025-05-23 | End: 2025-05-23

## 2025-05-23 RX ORDER — OXYCODONE HYDROCHLORIDE 5 MG/1
5 TABLET ORAL EVERY 4 HOURS PRN
Status: DISCONTINUED | OUTPATIENT
Start: 2025-05-23 | End: 2025-05-24

## 2025-05-23 RX ORDER — MEPERIDINE HYDROCHLORIDE 25 MG/ML
INJECTION INTRAMUSCULAR; INTRAVENOUS; SUBCUTANEOUS
Status: COMPLETED
Start: 2025-05-23 | End: 2025-05-23

## 2025-05-23 RX ORDER — SODIUM CHLORIDE, SODIUM LACTATE, POTASSIUM CHLORIDE, CALCIUM CHLORIDE 600; 310; 30; 20 MG/100ML; MG/100ML; MG/100ML; MG/100ML
INJECTION, SOLUTION INTRAVENOUS CONTINUOUS
Status: DISCONTINUED | OUTPATIENT
Start: 2025-05-23 | End: 2025-05-23 | Stop reason: HOSPADM

## 2025-05-23 RX ORDER — ONDANSETRON 2 MG/ML
INJECTION INTRAMUSCULAR; INTRAVENOUS AS NEEDED
Status: DISCONTINUED | OUTPATIENT
Start: 2025-05-23 | End: 2025-05-23 | Stop reason: SURG

## 2025-05-23 RX ORDER — ROCURONIUM BROMIDE 10 MG/ML
INJECTION, SOLUTION INTRAVENOUS AS NEEDED
Status: DISCONTINUED | OUTPATIENT
Start: 2025-05-23 | End: 2025-05-23 | Stop reason: SURG

## 2025-05-23 RX ORDER — NALOXONE HYDROCHLORIDE 0.4 MG/ML
0.08 INJECTION, SOLUTION INTRAMUSCULAR; INTRAVENOUS; SUBCUTANEOUS AS NEEDED
Status: DISCONTINUED | OUTPATIENT
Start: 2025-05-23 | End: 2025-05-23 | Stop reason: HOSPADM

## 2025-05-23 RX ORDER — SODIUM CHLORIDE 9 MG/ML
INJECTION, SOLUTION INTRAVENOUS CONTINUOUS
Status: DISCONTINUED | OUTPATIENT
Start: 2025-05-23 | End: 2025-05-24

## 2025-05-23 RX ORDER — PHENAZOPYRIDINE HYDROCHLORIDE 200 MG/1
200 TABLET, FILM COATED ORAL DAILY PRN
Status: DISCONTINUED | OUTPATIENT
Start: 2025-05-23 | End: 2025-05-24

## 2025-05-23 RX ORDER — MEPERIDINE HYDROCHLORIDE 25 MG/ML
12.5 INJECTION INTRAMUSCULAR; INTRAVENOUS; SUBCUTANEOUS AS NEEDED
Status: DISCONTINUED | OUTPATIENT
Start: 2025-05-23 | End: 2025-05-23 | Stop reason: HOSPADM

## 2025-05-23 RX ORDER — HYDROMORPHONE HYDROCHLORIDE 1 MG/ML
0.8 INJECTION, SOLUTION INTRAMUSCULAR; INTRAVENOUS; SUBCUTANEOUS EVERY 2 HOUR PRN
Status: DISCONTINUED | OUTPATIENT
Start: 2025-05-23 | End: 2025-05-24

## 2025-05-23 RX ORDER — HYDROCODONE BITARTRATE AND ACETAMINOPHEN 10; 325 MG/1; MG/1
2 TABLET ORAL ONCE AS NEEDED
Status: DISCONTINUED | OUTPATIENT
Start: 2025-05-23 | End: 2025-05-23 | Stop reason: HOSPADM

## 2025-05-23 RX ORDER — PROCHLORPERAZINE EDISYLATE 5 MG/ML
5 INJECTION INTRAMUSCULAR; INTRAVENOUS EVERY 8 HOURS PRN
Status: DISCONTINUED | OUTPATIENT
Start: 2025-05-23 | End: 2025-05-23 | Stop reason: HOSPADM

## 2025-05-23 RX ORDER — HYDROCODONE BITARTRATE AND ACETAMINOPHEN 10; 325 MG/1; MG/1
1 TABLET ORAL ONCE AS NEEDED
Status: DISCONTINUED | OUTPATIENT
Start: 2025-05-23 | End: 2025-05-23 | Stop reason: HOSPADM

## 2025-05-23 RX ORDER — DOCUSATE SODIUM 100 MG/1
100 CAPSULE, LIQUID FILLED ORAL 2 TIMES DAILY
Status: DISCONTINUED | OUTPATIENT
Start: 2025-05-23 | End: 2025-05-24

## 2025-05-23 RX ORDER — HYDROMORPHONE HYDROCHLORIDE 1 MG/ML
0.4 INJECTION, SOLUTION INTRAMUSCULAR; INTRAVENOUS; SUBCUTANEOUS EVERY 5 MIN PRN
Status: DISCONTINUED | OUTPATIENT
Start: 2025-05-23 | End: 2025-05-23 | Stop reason: HOSPADM

## 2025-05-23 RX ORDER — HYDROMORPHONE HYDROCHLORIDE 1 MG/ML
0.2 INJECTION, SOLUTION INTRAMUSCULAR; INTRAVENOUS; SUBCUTANEOUS EVERY 5 MIN PRN
Status: DISCONTINUED | OUTPATIENT
Start: 2025-05-23 | End: 2025-05-23 | Stop reason: HOSPADM

## 2025-05-23 RX ORDER — ACETAMINOPHEN 500 MG
500 TABLET ORAL EVERY 4 HOURS PRN
Status: DISCONTINUED | OUTPATIENT
Start: 2025-05-23 | End: 2025-05-24

## 2025-05-23 RX ORDER — HYDROMORPHONE HYDROCHLORIDE 1 MG/ML
0.6 INJECTION, SOLUTION INTRAMUSCULAR; INTRAVENOUS; SUBCUTANEOUS EVERY 5 MIN PRN
Status: DISCONTINUED | OUTPATIENT
Start: 2025-05-23 | End: 2025-05-23 | Stop reason: HOSPADM

## 2025-05-23 RX ORDER — HYDROMORPHONE HYDROCHLORIDE 1 MG/ML
0.4 INJECTION, SOLUTION INTRAMUSCULAR; INTRAVENOUS; SUBCUTANEOUS EVERY 2 HOUR PRN
Status: DISCONTINUED | OUTPATIENT
Start: 2025-05-23 | End: 2025-05-24

## 2025-05-23 RX ORDER — ONDANSETRON 4 MG/1
4 TABLET, ORALLY DISINTEGRATING ORAL EVERY 8 HOURS PRN
Status: DISCONTINUED | OUTPATIENT
Start: 2025-05-23 | End: 2025-05-24

## 2025-05-23 RX ORDER — DEXAMETHASONE SODIUM PHOSPHATE 4 MG/ML
VIAL (ML) INJECTION AS NEEDED
Status: DISCONTINUED | OUTPATIENT
Start: 2025-05-23 | End: 2025-05-23 | Stop reason: SURG

## 2025-05-23 RX ORDER — ACETAMINOPHEN 500 MG
1000 TABLET ORAL ONCE AS NEEDED
Status: DISCONTINUED | OUTPATIENT
Start: 2025-05-23 | End: 2025-05-23 | Stop reason: HOSPADM

## 2025-05-23 RX ORDER — LIDOCAINE HYDROCHLORIDE 10 MG/ML
INJECTION, SOLUTION EPIDURAL; INFILTRATION; INTRACAUDAL; PERINEURAL AS NEEDED
Status: DISCONTINUED | OUTPATIENT
Start: 2025-05-23 | End: 2025-05-23 | Stop reason: SURG

## 2025-05-23 RX ORDER — ACETAMINOPHEN 10 MG/ML
INJECTION, SOLUTION INTRAVENOUS AS NEEDED
Status: DISCONTINUED | OUTPATIENT
Start: 2025-05-23 | End: 2025-05-23 | Stop reason: SURG

## 2025-05-23 RX ORDER — ONDANSETRON 2 MG/ML
4 INJECTION INTRAMUSCULAR; INTRAVENOUS EVERY 6 HOURS PRN
Status: DISCONTINUED | OUTPATIENT
Start: 2025-05-23 | End: 2025-05-23 | Stop reason: HOSPADM

## 2025-05-23 RX ORDER — SODIUM CHLORIDE, SODIUM LACTATE, POTASSIUM CHLORIDE, CALCIUM CHLORIDE 600; 310; 30; 20 MG/100ML; MG/100ML; MG/100ML; MG/100ML
INJECTION, SOLUTION INTRAVENOUS CONTINUOUS
Status: DISCONTINUED | OUTPATIENT
Start: 2025-05-23 | End: 2025-05-23

## 2025-05-23 RX ORDER — ONDANSETRON 2 MG/ML
4 INJECTION INTRAMUSCULAR; INTRAVENOUS EVERY 6 HOURS PRN
Status: DISCONTINUED | OUTPATIENT
Start: 2025-05-23 | End: 2025-05-24

## 2025-05-23 RX ORDER — ONDANSETRON 2 MG/ML
INJECTION INTRAMUSCULAR; INTRAVENOUS
Status: COMPLETED
Start: 2025-05-23 | End: 2025-05-23

## 2025-05-23 RX ORDER — OXYCODONE HYDROCHLORIDE 10 MG/1
10 TABLET ORAL EVERY 4 HOURS PRN
Status: DISCONTINUED | OUTPATIENT
Start: 2025-05-23 | End: 2025-05-24

## 2025-05-23 RX ADMIN — ROCURONIUM BROMIDE 10 MG: 10 INJECTION, SOLUTION INTRAVENOUS at 15:10:00

## 2025-05-23 RX ADMIN — SODIUM CHLORIDE, SODIUM LACTATE, POTASSIUM CHLORIDE, CALCIUM CHLORIDE: 600; 310; 30; 20 INJECTION, SOLUTION INTRAVENOUS at 16:03:00

## 2025-05-23 RX ADMIN — ROCURONIUM BROMIDE 10 MG: 10 INJECTION, SOLUTION INTRAVENOUS at 14:44:00

## 2025-05-23 RX ADMIN — SODIUM CHLORIDE, SODIUM LACTATE, POTASSIUM CHLORIDE, CALCIUM CHLORIDE: 600; 310; 30; 20 INJECTION, SOLUTION INTRAVENOUS at 13:23:00

## 2025-05-23 RX ADMIN — DEXAMETHASONE SODIUM PHOSPHATE 8 MG: 4 MG/ML VIAL (ML) INJECTION at 13:57:00

## 2025-05-23 RX ADMIN — ONDANSETRON 4 MG: 2 INJECTION INTRAMUSCULAR; INTRAVENOUS at 15:34:00

## 2025-05-23 RX ADMIN — ROCURONIUM BROMIDE 20 MG: 10 INJECTION, SOLUTION INTRAVENOUS at 14:16:00

## 2025-05-23 RX ADMIN — ACETAMINOPHEN 1000 MG: 10 INJECTION, SOLUTION INTRAVENOUS at 15:18:00

## 2025-05-23 RX ADMIN — ACETAMINOPHEN: 10 INJECTION, SOLUTION INTRAVENOUS at 16:03:00

## 2025-05-23 RX ADMIN — ROCURONIUM BROMIDE 50 MG: 10 INJECTION, SOLUTION INTRAVENOUS at 13:26:00

## 2025-05-23 RX ADMIN — LIDOCAINE HYDROCHLORIDE 50 MG: 10 INJECTION, SOLUTION EPIDURAL; INFILTRATION; INTRACAUDAL; PERINEURAL at 13:25:00

## 2025-05-23 NOTE — PROGRESS NOTES
NURSING ADMISSION NOTE      Patient admitted via bed  Oriented to room.  Safety precautions initiated.  Bed in low position.  Call light in reach.    Patient arrived from PACU in stable condition.

## 2025-05-23 NOTE — H&P
Pre-op Diagnosis: LEFT HYDRONEPHROSIS    The above referenced H&P by Dr Mendoza from 5/7/25  was reviewed by Charlene Johnson MD on 5/23/2025, the patient was examined and no significant changes have occurred in the patient's condition since the H&P was performed.  I discussed with the patient and/or legal representative the potential benefits, risks and side effects of this procedure; the likelihood of the patient achieving goals; and potential problems that might occur during recuperation.  I discussed reasonable alternatives to the procedure, including risks, benefits and side effects related to the alternatives and risks related to not receiving this procedure.  We will proceed with procedure as planned.    MARCELA Johnson MD   5/23/25

## 2025-05-23 NOTE — ANESTHESIA PROCEDURE NOTES
Airway  Date/Time: 5/23/2025 1:32 PM  Reason: elective    Airway not difficult    General Information and Staff   Patient location during procedure: OR  Anesthesiologist: Michele Colbert MD  Performed: anesthesiologist   Performed by: Michele Colbert MD  Authorized by: Michele Colbert MD        Indications and Patient Condition  Indications for airway management: anesthesia  Sedation level: deep      Preoxygenated: yesPatient position: sniffing    Mask difficulty assessment: 1 - vent by mask  Planned trial extubation    Final Airway Details    Final airway type: endotracheal airway    Successful airway: ETT  Cuffed: yes   Successful intubation technique: direct laryngoscopy  Endotracheal tube insertion site: oral  Blade: Alan  Blade size: #3  ETT size (mm): 7.0    Cormack-Lehane Classification: grade I - full view of glottis  Placement verified by: capnometry   Measured from: lips  ETT to lips (cm): 20  Number of attempts at approach: 1

## 2025-05-23 NOTE — ANESTHESIA POSTPROCEDURE EVALUATION
[facility]    Austin Mcfarland Patient Status:  Outpatient in a Bed   Age/Gender 34 year old male MRN [id number]   Location [facility] SURGERY Attending Charlene Johnson MD   Hosp Day # 0 PCP FRANK LOERA       Anesthesia Post-op Note    ROBOTIC ASSISTED LAPAROSCOPIC LEFT URETEROLYSIS AND OMENTAL WRAP OF URETER    Procedure Summary       Date: 05/23/25 Room / Location:  MAIN OR 09 / [facility] MAIN OR    Anesthesia Start: 1322 Anesthesia Stop: 1603    Procedure: ROBOTIC ASSISTED LAPAROSCOPIC LEFT URETEROLYSIS AND OMENTAL WRAP OF URETER (Left) Diagnosis: (HYDRONEPHROSIS)    Surgeons: Charlene Johnson MD Anesthesiologist: Michele Colbert MD    Anesthesia Type: general, regional ASA Status: 2            Anesthesia Type: general, regional    Vitals Value Taken Time   /94 05/23/25 16:04   Temp 97.3 °F (36.3 °C) 05/23/25 16:04   Pulse 101 05/23/25 16:04   Resp 16 05/23/25 16:04   SpO2 99 % 05/23/25 16:04   Vitals shown include unfiled device data.        Patient Location: PACU    Anesthesia Type: general    Airway Patency: patent    Postop Pain Control: adequate    Mental Status: mildly sedated but able to meaningfully participate in the post-anesthesia evaluation    Nausea/Vomiting: none    Cardiopulmonary/Hydration status: stable euvolemic    Complications: no apparent anesthesia related complications    Postop vital signs: stable    Dental Exam: Unchanged from Preop    Patient to be discharged from PACU when criteria met.

## 2025-05-23 NOTE — ANESTHESIA PREPROCEDURE EVALUATION
PRE-OP EVALUATION    Patient Name: Austin Mcfarland    Admit Diagnosis: HYDRONEPHROSIS    Pre-op Diagnosis: HYDRONEPHROSIS    ROBOTIC ASSISTED LAPAROSCOPIC LEFT URETEROLYSIS AND OMENTAL WRAP OF URETER    Anesthesia Procedure: ROBOTIC ASSISTED LAPAROSCOPIC LEFT URETEROLYSIS AND OMENTAL WRAP OF URETER (Left)    Surgeons and Role:     * Charlene Johnson MD - Primary    Pre-op vitals reviewed.        Body mass index is 22.69 kg/m².    Current medications reviewed.  Hospital Medications:  Current Medications[1]    Outpatient Medications:   Prescriptions Prior to Admission[2]    Allergies: Patient has no known allergies.      Anesthesia Evaluation    Patient summary reviewed.    Anesthetic Complications           GI/Hepatic/Renal                                 Cardiovascular                                                       Endo/Other                                  Pulmonary                           Neuro/Psych      (+) depression                                Past Surgical History[3]  Social Hx on file[4]  History   Drug Use    Frequency: 7.0 times per week    Types: Cannabis     Comment: medical     Available pre-op labs reviewed.  Lab Results   Component Value Date    WBC 4.8 03/27/2025    RBC 4.50 03/27/2025    HGB 14.0 03/27/2025    HCT 40.2 03/27/2025    MCV 89.3 03/27/2025    MCH 31.1 03/27/2025    MCHC 34.8 03/27/2025    RDW 12.2 03/27/2025    .0 03/27/2025     Lab Results   Component Value Date     03/27/2025    K 4.3 03/27/2025     03/27/2025    CO2 29.0 03/27/2025    BUN 9 03/27/2025    CREATSERUM 0.94 03/27/2025     (H) 03/27/2025    CA 8.9 03/27/2025            Airway      Mallampati: I  Mouth opening: >3 FB  TM distance: > 6 cm  Neck ROM: full Cardiovascular    Cardiovascular exam normal.  Rhythm: regular  Rate: normal     Dental             Pulmonary    Pulmonary exam normal.                 Other findings              ASA: 2   Plan: general and regional  NPO status  verified and patient meets guidelines.        Comment: Bilateral TAP blocks and GETA discussed with the patient. He agrees with the anesthetic plan.  Plan/risks discussed with: patient and significant other                Present on Admission:  **None**             [1]   No current facility-administered medications on file as of 5/23/2025.   [2]   Medications Prior to Admission   Medication Sig Dispense Refill Last Dose/Taking    phenazopyridine 200 MG Oral Tab Take 1 tablet (200 mg total) by mouth daily as needed for Pain.   Taking As Needed    ondansetron 4 MG Oral Tablet Dispersible Take 1 tablet (4 mg total) by mouth every 8 (eight) hours as needed for Nausea.   Taking As Needed    acetaminophen 500 MG Oral Tab Take 1-2 tablets (500-1,000 mg total) by mouth every 4 (four) hours as needed for Pain.   Taking As Needed   [3]   Past Surgical History:  Procedure Laterality Date    Cystoscopy,+ureteroscopy Left 07/31/2010    s/p left rpg, left urs, left renoscopy, cysto, with stent placement 7-31-10 at [facility], Dr John Sheffield    Cystoscopy,+ureteroscopy Left 12/03/2010    L URS, Dr. Sheffield    Cystoscopy,+ureteroscopy Left     Cysto, URS, RPG, stent Bladder Stone removal-Dr Wilson    Cystoscopy,+ureteroscopy Left 03/19/2015    no stent placed, [facility] DIONY    Cystoscopy,insert ureteral stent  04/22/2014    Procedure: LITHOTRIPSY WITH CYSTOSCOPY, STENT PLACEMENT;  Surgeon: Dex Jane DO;  Location: [facility]    Cystoscopy,remv calculus,simple  12/27/2007    Performed by JOHN SHEFFIELD at [facility]    Cystoscopy,remv calculus,simple  05/23/2008    Performed by JOHN SHEFFIELD at [facility]    Cystourethroscopy Left 12/27/2010    cysto stent removal- Dr. Sheffield    Cystourethroscopy Left 12/19/2014    Cysto Stent Removal-Dr Jane    Cystourethroscopy,ureter catheter  03/10/2008    Performed by JOHN SHEFFIELD at [facility]    Cystourethroscopy,ureter catheter   04/22/2014    Procedure: LITHOTRIPSY WITH CYSTOSCOPY, STENT PLACEMENT;  Surgeon: Dex Jane DO;  Location: [facility]    Fragmenting of kidney stone  09/25/2007    Performed by DEBORAH CALDERON at [facility]    Fragmenting of kidney stone  09/25/2007    Performed by DEBORAH CALDERON at [facility], [facility]    Fragmenting of kidney stone  04/22/2014    Procedure: LITHOTRIPSY WITH CYSTOSCOPY, STENT PLACEMENT;  Surgeon: Dex Jane DO;  Location: [facility]    Ir nephhrostomy tube  2007    Cysto stent removal, nephrostomy tube placement    Laparoscopy, surgical; pyeloplasty  10/11/2007    Left UPJ repair    Lithotripsy Left 06/21/2007    Left ESWL    Other surgical history  10/08/2016    Cysto, Lt RPG, Lt URS & Nephroscopy, Stone Manipulation, Stone Basket & Removal, Stent Placement-Dr. Calderon     Other surgical history      left pcnl cdh    Other surgical history      stent placement cdh    Other surgical history  12/05/2016    cysto stent removal dr calderon    Other surgical history  12/11/2020    Cysto/stent removal Dr. Su    Other surgical history  06/25/2021    CYSTOSCOPY, LEFT DIAGNOSTIC URETEROSCOPY, LEFT RETROGRADE PYELOGRAM, LEFT URETERAL STENT PLACEMENT,    Other surgical history  06/11/2021    CYSTOSCOPY,LEFT URETEROSCOPY,, RETROGRADE PYELOGRAM, LEFT STENT INSERTION    Other surgical history  01/21/2022    Cysto, Lt URS, RPHG, LL, Stone Extraction, Lt Stent Placement - Dr. Steel    Other surgical history  02/03/2022    Cysto Stent Removal- Dr. Steel    Renal endoscopy  03/10/2008    Performed by DEBORAH CALDERON at [facility]    Special service or report  05/09/2007    Cysto, stone extraction    Special service or report  09/20/2006    Neph tube insertion with endopyelotomy    Special service or report  10/21/2007    Larger ureteral stent placed    Urology surgery procedure unlisted  05/23/2008    Performed by DEBORAH CALDERON at [facility], [facility]    X-ray antegrade pyelogram tube  03/10/2008    Performed by DEBORAH KOHLI at [facility]    X-ray retrograde pyelogram  03/10/2008    Performed by DEBORAH KOHLI at [facility]    X-ray retrograde pyelogram  05/23/2008    Performed by DEBORAH KOHLI at [facility]    X-ray retrograde pyelogram  04/22/2014    Procedure: LITHOTRIPSY WITH CYSTOSCOPY, STENT PLACEMENT;  Surgeon: Dex Jane DO;  Location: [facility], [facility]   [4]   Social History  Socioeconomic History    Marital status: Single   Tobacco Use    Smoking status: Never    Smokeless tobacco: Never   Vaping Use    Vaping status: Never Used   Substance and Sexual Activity    Alcohol use: Yes     Comment: ocassionally    Drug use: Yes     Frequency: 7.0 times per week     Types: Cannabis     Comment: medical

## 2025-05-23 NOTE — OPERATIVE REPORT
[facility]                Operative Note      Austin Mcfarland Patient Status:  Outpatient in a Bed    1990 MRN [id number]   [facility] Attending Charlene Johnson MD    Day # 0 PCP FRANK LOERA         DATE OF SURGERY: 2025    PRIMARY SURGEON: Charlene Johnson MD    FIRST ASSISTANT (bedside): Abbie Rosenberg    PREOPERATIVE DIAGNOSES:  Left hydronephrosis, retroperitoneal fibrosis, left flank pain     POSTOPERATIVE DIAGNOSES: Same.    PROCEDURE PERFORMED:  Laparoscopic robotic-assisted left ureterolysis, omental flap of ureter     COMPLICATIONS:  None.     ESTIMATED BLOOD LOSS:  25 mL.    PATHOLOGICAL SPECIMEN: none    DRAINS:  retained left ureter stent     Please note that Abbie Rosenberg provided necessary first assistant services under my supervision throughout the robotic case.    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  He has done well since  when buccal mucosal graft was placed until earlier this year when he presented with relatively frequent incidents of left flank pain.  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   Different treatment options were reviewed with the patient in great detail.  Patient elected to undergo a laparoscopic robotic-assisted ureterolysis with omental flap after reviewing all risks, benefits, possible complications, and personel involved.  A consent was obtained.    PROCEDURE:  The patient was brought back to the operating room.  Patient was given intravenous antibiotics preoperatively.  Bilateral EPC cuffs were placed on the lower extremities.  General anesthesia was induced and the patient was then positioned in a right decubitus position with proper  padding.  A 16 Fr Michaels was inserted.  The patient was prepped and draped in the sterile fashion.      Through a periumbilical incision, a Veress needle was inserted into the peritoneum and pneumoperitoneum was obtained without complications.  An 8-mm port was then placed through the same incision and a camera was introduced.  No organ injury was observed.  The remaining ports were placed in standard fashion under direct vision including an 8-mm trocar subcostally, an 8 mm trocar in left lower abdomen,  and another 12-mm just inferior to the first camera port. The robotic arms were then attached.    We mobilized the left colon medially, exposing the retroperitoneal space; there was significant scar tissue and desmoplastic tissue changes due to prior surgeries.  The left gonadal vein and artery were identified and followed cephalad to the left renal hilum.  We were able to identify the left ureter under the gonadal vein, and the space between these 2 structures and psoas muscle was visualized; again, the ureter appeared dilated and stiff, as well as significantly embedded in the retroperitoneal scar tissues.  The left renal pelvis was dilated - likely from chronic obstruction.  Blunt dissection,  but mostly sharp dissection was then employed to release the ureter circumferentially and achieve ureterolysis from the level of UPJ area and distally just passed the common iliac vessels. Then, the abdominal omentum was brought down and under the ureter; it was used to wrap the ureter in its entirety and it was secured via Weck clips at a few spots. There were no complications during this procedure.     The 12-mm assist port was then closed with 0-Vycril interrupted suture.   The skin incisions were approximated using 4-0 Monocryl subcuticular stitches.  There were no complications during the surgery.    I was present throughout the entirety of this procedure.      DISPOSITION:  The patient was transferred to PACU in stable  condition and he was extubated without difficulty.  The patient will be admitted to hospital.    Charlene Johnson MD  [facility]  5/23/2025

## 2025-05-24 VITALS
TEMPERATURE: 98 F | HEIGHT: 73 IN | SYSTOLIC BLOOD PRESSURE: 98 MMHG | BODY MASS INDEX: 22.8 KG/M2 | DIASTOLIC BLOOD PRESSURE: 78 MMHG | WEIGHT: 172 LBS | HEART RATE: 92 BPM | RESPIRATION RATE: 18 BRPM | OXYGEN SATURATION: 98 %

## 2025-05-24 LAB
ANION GAP SERPL CALC-SCNC: 11 MMOL/L (ref 0–18)
BASOPHILS # BLD AUTO: 0.01 X10(3) UL (ref 0–0.2)
BASOPHILS NFR BLD AUTO: 0.1 %
BUN BLD-MCNC: 9 MG/DL (ref 9–23)
CALCIUM BLD-MCNC: 8.6 MG/DL (ref 8.7–10.6)
CHLORIDE SERPL-SCNC: 104 MMOL/L (ref 98–112)
CO2 SERPL-SCNC: 26 MMOL/L (ref 21–32)
CREAT BLD-MCNC: 1.06 MG/DL (ref 0.7–1.3)
EGFRCR SERPLBLD CKD-EPI 2021: 95 ML/MIN/1.73M2 (ref 60–?)
EOSINOPHIL # BLD AUTO: 0 X10(3) UL (ref 0–0.7)
EOSINOPHIL NFR BLD AUTO: 0 %
ERYTHROCYTE [DISTWIDTH] IN BLOOD BY AUTOMATED COUNT: 12.2 %
GLUCOSE BLD-MCNC: 113 MG/DL (ref 70–99)
HCT VFR BLD AUTO: 40.2 % (ref 39–53)
HGB BLD-MCNC: 13.9 G/DL (ref 13–17.5)
IMM GRANULOCYTES # BLD AUTO: 0.02 X10(3) UL (ref 0–1)
IMM GRANULOCYTES NFR BLD: 0.2 %
LYMPHOCYTES # BLD AUTO: 1.12 X10(3) UL (ref 1–4)
LYMPHOCYTES NFR BLD AUTO: 12 %
MCH RBC QN AUTO: 30.6 PG (ref 26–34)
MCHC RBC AUTO-ENTMCNC: 34.6 G/DL (ref 31–37)
MCV RBC AUTO: 88.5 FL (ref 80–100)
MONOCYTES # BLD AUTO: 0.9 X10(3) UL (ref 0.1–1)
MONOCYTES NFR BLD AUTO: 9.6 %
NEUTROPHILS # BLD AUTO: 7.29 X10 (3) UL (ref 1.5–7.7)
NEUTROPHILS # BLD AUTO: 7.29 X10(3) UL (ref 1.5–7.7)
NEUTROPHILS NFR BLD AUTO: 78.1 %
OSMOLALITY SERPL CALC.SUM OF ELEC: 291 MOSM/KG (ref 275–295)
PLATELET # BLD AUTO: 205 10(3)UL (ref 150–450)
POTASSIUM SERPL-SCNC: 4.2 MMOL/L (ref 3.5–5.1)
RBC # BLD AUTO: 4.54 X10(6)UL (ref 4.3–5.7)
SODIUM SERPL-SCNC: 141 MMOL/L (ref 136–145)
WBC # BLD AUTO: 9.3 X10(3) UL (ref 4–11)

## 2025-05-24 PROCEDURE — 85025 COMPLETE CBC W/AUTO DIFF WBC: CPT | Performed by: UROLOGY

## 2025-05-24 PROCEDURE — 80048 BASIC METABOLIC PNL TOTAL CA: CPT | Performed by: UROLOGY

## 2025-05-24 RX ORDER — HYDROCODONE BITARTRATE AND ACETAMINOPHEN 10; 325 MG/1; MG/1
1 TABLET ORAL EVERY 6 HOURS PRN
Qty: 15 TABLET | Refills: 0 | Status: SHIPPED | OUTPATIENT
Start: 2025-05-24 | End: 2025-06-03

## 2025-05-24 NOTE — PLAN OF CARE
Assumed care of patient at 0700  A/Ox4, RA, VSS  PRN Oxy for pain  Abdominal incisions C/D/I  Patient tolerating diet  Patient okay to discharge from urology's standpoint  Discharge instructions discussed with patient at the bedside. All questions were answered and patient verbalized understanding  Patient was discharged with all belongings by wheelchair to be taken home by fiance.         Problem: Patient/Family Goals  Goal: Patient/Family Long Term Goal  Description: Patient's Long Term Goal: Discharge Home    Interventions:  - Pain Tolerance  -Diet Tolerance  -Return to normal ADL's    - See additional Care Plan goals for specific interventions  Outcome: Adequate for Discharge  Goal: Patient/Family Short Term Goal  Description: Patient's Short Term Goal: Prepare to Discharge Home    Interventions:   - Transition from IV to oral pain medications  -Advance diet as tolerated  -Encourage ambulation  - See additional Care Plan goals for specific interventions  Outcome: Adequate for Discharge     Problem: GENITOURINARY - ADULT  Goal: Absence of urinary retention  Description: INTERVENTIONS:  - Assess patient’s ability to void and empty bladder  - Monitor intake/output and perform bladder scan as needed  - Follow urinary retention protocol/standard of care  - Consider collaborating with pharmacy to review patient's medication profile  - Implement strategies to promote bladder emptying  Outcome: Adequate for Discharge

## 2025-05-24 NOTE — PROGRESS NOTES
[facility]  Urology Progress Note    Austin Mcfarland Patient Status:  Outpatient in a Bed    1990 MRN [id number]   Location [facility] 3NW-A Attending Charlene Johnson MD   Hosp Day # 0 PCP FRANK LOERA     Assessment: Left hydronephrosis, retroperitoneal fibrosis.  Postoperative day #1  Laparoscopic robotic-assisted left ureterolysis, omental flap of ureter by Dr. Johnson. Recovered well. Discharge today    Plan: Discharge today. Follow up in  2-3 weeks as scheduled for cystoscopy and stent removal in office..      Summary/Hospital course to date:    Subjective:  Austin Mcfarland is a(n) 34 year old male.    Current complaints: minimal discomfort. Passing constance    Objective: No acute distress. Sitting in chair. Looks well. Smiling.  Blood pressure 98/78, pulse 92, temperature 98 °F (36.7 °C), temperature source Oral, resp. rate 18, height 6' 1\" (1.854 m), weight 172 lb (78 kg), SpO2 98%.  Lungs: Respirations not labored.  Cardiac:No murmurs, gallops or rub.  Abdomen:Soft, Not distended, Active Bowel sounds.  Incisions:  Clean and dry.  Neuro: Alert. Oriented  Lab Results   Component Value Date    WBC 9.3 2025    HGB 13.9 2025    .0 2025    CREATSERUM 1.06 2025     2025    K 4.2 2025    CO2 26.0 2025     2025    CA 8.6 2025       Aldo Barajas M.D.  [facility], Urology   [phone]  2025  12:13 PM

## 2025-05-24 NOTE — PLAN OF CARE
Patient is alert and oriented. On room air. Patient noted to be anxious d/t feeling hot, light-headed, and nausea. Every time patient moves he vomits. Emesis occurrence x3. Zofran and compazine given. Ice packs applied to back of neck. Ativan offered, however patient declined. Lap sites x5 closed with skin glue-c/d/I. Voided in urinal. Receiving IVF. Up with SB. Patient declines pain. Call light within reach.

## 2025-05-24 NOTE — DISCHARGE INSTRUCTIONS
Follow up in  2-3 weeks as scheduled for cystoscopy and stent removal in office..   No vigorous activity for or lifting over 15 lbs for 6 weeks.  OK to drive when moving around well enough to do so.  OK to shower.  Call if nausea, fever, or lightheadedness.

## 2025-05-24 NOTE — PROGRESS NOTES
A&Ox4. RA. VSS. Denies pain and SOB. Nausea present managed per MAR. 5 lap sites CDI with skin glue. Safety measures in place. All questions and concerns addressed. Call light within reach.

## 2025-06-16 ENCOUNTER — APPOINTMENT (OUTPATIENT)
Dept: GENERAL RADIOLOGY | Facility: HOSPITAL | Age: 35
End: 2025-06-16
Attending: UROLOGY
Payer: COMMERCIAL

## 2025-06-16 ENCOUNTER — HOSPITAL ENCOUNTER (EMERGENCY)
Facility: HOSPITAL | Age: 35
Discharge: HOME | End: 2025-06-16
Attending: EMERGENCY MEDICINE
Payer: COMMERCIAL

## 2025-06-16 ENCOUNTER — ANESTHESIA EVENT (OUTPATIENT)
Dept: SURGERY | Facility: HOSPITAL | Age: 35
End: 2025-06-16
Payer: COMMERCIAL

## 2025-06-16 ENCOUNTER — ANESTHESIA (OUTPATIENT)
Dept: SURGERY | Facility: HOSPITAL | Age: 35
End: 2025-06-16
Payer: COMMERCIAL

## 2025-06-16 VITALS
RESPIRATION RATE: 20 BRPM | WEIGHT: 170 LBS | HEART RATE: 63 BPM | HEIGHT: 73 IN | SYSTOLIC BLOOD PRESSURE: 119 MMHG | DIASTOLIC BLOOD PRESSURE: 84 MMHG | OXYGEN SATURATION: 100 % | TEMPERATURE: 97 F | BODY MASS INDEX: 22.53 KG/M2

## 2025-06-16 DIAGNOSIS — Z96.0 URETERAL STENT RETAINED: Primary | ICD-10-CM

## 2025-06-16 LAB
ALBUMIN SERPL-MCNC: 4.8 G/DL (ref 3.2–4.8)
ALBUMIN/GLOB SERPL: 1.8 {RATIO} (ref 1–2)
ALP LIVER SERPL-CCNC: 78 U/L (ref 45–117)
ALT SERPL-CCNC: 11 U/L (ref 10–49)
ANION GAP SERPL CALC-SCNC: 7 MMOL/L (ref 0–18)
AST SERPL-CCNC: 15 U/L (ref ?–34)
BASOPHILS # BLD AUTO: 0.04 X10(3) UL (ref 0–0.2)
BASOPHILS NFR BLD AUTO: 0.8 %
BILIRUB SERPL-MCNC: 0.7 MG/DL (ref 0.3–1.2)
BUN BLD-MCNC: 9 MG/DL (ref 9–23)
CALCIUM BLD-MCNC: 9.4 MG/DL (ref 8.7–10.6)
CHLORIDE SERPL-SCNC: 103 MMOL/L (ref 98–112)
CO2 SERPL-SCNC: 29 MMOL/L (ref 21–32)
CREAT BLD-MCNC: 1.01 MG/DL (ref 0.7–1.3)
EGFRCR SERPLBLD CKD-EPI 2021: 101 ML/MIN/1.73M2 (ref 60–?)
EOSINOPHIL # BLD AUTO: 0.07 X10(3) UL (ref 0–0.7)
EOSINOPHIL NFR BLD AUTO: 1.4 %
ERYTHROCYTE [DISTWIDTH] IN BLOOD BY AUTOMATED COUNT: 12.1 %
GLOBULIN PLAS-MCNC: 2.6 G/DL (ref 2–3.5)
GLUCOSE BLD-MCNC: 98 MG/DL (ref 70–99)
HCT VFR BLD AUTO: 41 % (ref 39–53)
HGB BLD-MCNC: 14.5 G/DL (ref 13–17.5)
IMM GRANULOCYTES # BLD AUTO: 0.01 X10(3) UL (ref 0–1)
IMM GRANULOCYTES NFR BLD: 0.2 %
LYMPHOCYTES # BLD AUTO: 1.66 X10(3) UL (ref 1–4)
LYMPHOCYTES NFR BLD AUTO: 32.7 %
MCH RBC QN AUTO: 31.3 PG (ref 26–34)
MCHC RBC AUTO-ENTMCNC: 35.4 G/DL (ref 31–37)
MCV RBC AUTO: 88.6 FL (ref 80–100)
MONOCYTES # BLD AUTO: 0.4 X10(3) UL (ref 0.1–1)
MONOCYTES NFR BLD AUTO: 7.9 %
NEUTROPHILS # BLD AUTO: 2.89 X10 (3) UL (ref 1.5–7.7)
NEUTROPHILS # BLD AUTO: 2.89 X10(3) UL (ref 1.5–7.7)
NEUTROPHILS NFR BLD AUTO: 57 %
OSMOLALITY SERPL CALC.SUM OF ELEC: 287 MOSM/KG (ref 275–295)
PLATELET # BLD AUTO: 201 10(3)UL (ref 150–450)
POTASSIUM SERPL-SCNC: 4.2 MMOL/L (ref 3.5–5.1)
PROT SERPL-MCNC: 7.4 G/DL (ref 5.7–8.2)
RBC # BLD AUTO: 4.63 X10(6)UL (ref 4.3–5.7)
RBC #/AREA URNS AUTO: >10 /HPF
SODIUM SERPL-SCNC: 139 MMOL/L (ref 136–145)
SP GR UR REFRACTOMETRY: 1.01 (ref 1–1.03)
WBC # BLD AUTO: 5.1 X10(3) UL (ref 4–11)

## 2025-06-16 PROCEDURE — 87086 URINE CULTURE/COLONY COUNT: CPT | Performed by: EMERGENCY MEDICINE

## 2025-06-16 PROCEDURE — 81001 URINALYSIS AUTO W/SCOPE: CPT | Performed by: EMERGENCY MEDICINE

## 2025-06-16 PROCEDURE — 96375 TX/PRO/DX INJ NEW DRUG ADDON: CPT

## 2025-06-16 PROCEDURE — 96361 HYDRATE IV INFUSION ADD-ON: CPT

## 2025-06-16 PROCEDURE — 99285 EMERGENCY DEPT VISIT HI MDM: CPT

## 2025-06-16 PROCEDURE — 85025 COMPLETE CBC W/AUTO DIFF WBC: CPT | Performed by: EMERGENCY MEDICINE

## 2025-06-16 PROCEDURE — 80053 COMPREHEN METABOLIC PANEL: CPT | Performed by: EMERGENCY MEDICINE

## 2025-06-16 PROCEDURE — 96376 TX/PRO/DX INJ SAME DRUG ADON: CPT

## 2025-06-16 PROCEDURE — 96374 THER/PROPH/DIAG INJ IV PUSH: CPT

## 2025-06-16 RX ORDER — DEXAMETHASONE SODIUM PHOSPHATE 4 MG/ML
VIAL (ML) INJECTION AS NEEDED
Status: DISCONTINUED | OUTPATIENT
Start: 2025-06-16 | End: 2025-06-16 | Stop reason: SURG

## 2025-06-16 RX ORDER — HYDROCODONE BITARTRATE AND ACETAMINOPHEN 5; 325 MG/1; MG/1
1 TABLET ORAL EVERY 4 HOURS PRN
Refills: 0 | Status: DISCONTINUED | OUTPATIENT
Start: 2025-06-16 | End: 2025-06-16

## 2025-06-16 RX ORDER — CEFAZOLIN SODIUM 1 G/3ML
INJECTION, POWDER, FOR SOLUTION INTRAMUSCULAR; INTRAVENOUS AS NEEDED
Status: DISCONTINUED | OUTPATIENT
Start: 2025-06-16 | End: 2025-06-16 | Stop reason: SURG

## 2025-06-16 RX ORDER — HYDROMORPHONE HYDROCHLORIDE 1 MG/ML
0.6 INJECTION, SOLUTION INTRAMUSCULAR; INTRAVENOUS; SUBCUTANEOUS EVERY 5 MIN PRN
Status: DISCONTINUED | OUTPATIENT
Start: 2025-06-16 | End: 2025-06-16

## 2025-06-16 RX ORDER — NALOXONE HYDROCHLORIDE 0.4 MG/ML
0.08 INJECTION, SOLUTION INTRAMUSCULAR; INTRAVENOUS; SUBCUTANEOUS AS NEEDED
Status: DISCONTINUED | OUTPATIENT
Start: 2025-06-16 | End: 2025-06-16

## 2025-06-16 RX ORDER — KETOROLAC TROMETHAMINE 30 MG/ML
INJECTION, SOLUTION INTRAMUSCULAR; INTRAVENOUS AS NEEDED
Status: DISCONTINUED | OUTPATIENT
Start: 2025-06-16 | End: 2025-06-16 | Stop reason: SURG

## 2025-06-16 RX ORDER — HYDROMORPHONE HYDROCHLORIDE 1 MG/ML
0.4 INJECTION, SOLUTION INTRAMUSCULAR; INTRAVENOUS; SUBCUTANEOUS EVERY 5 MIN PRN
Status: DISCONTINUED | OUTPATIENT
Start: 2025-06-16 | End: 2025-06-16

## 2025-06-16 RX ORDER — ONDANSETRON 2 MG/ML
4 INJECTION INTRAMUSCULAR; INTRAVENOUS EVERY 6 HOURS PRN
Status: DISCONTINUED | OUTPATIENT
Start: 2025-06-16 | End: 2025-06-16

## 2025-06-16 RX ORDER — PHENAZOPYRIDINE HYDROCHLORIDE 100 MG/1
100 TABLET, FILM COATED ORAL 3 TIMES DAILY PRN
Qty: 10 TABLET | Refills: 1 | Status: SHIPPED | OUTPATIENT
Start: 2025-06-16 | End: 2025-06-26

## 2025-06-16 RX ORDER — LIDOCAINE HYDROCHLORIDE 10 MG/ML
INJECTION, SOLUTION EPIDURAL; INFILTRATION; INTRACAUDAL; PERINEURAL AS NEEDED
Status: DISCONTINUED | OUTPATIENT
Start: 2025-06-16 | End: 2025-06-16 | Stop reason: SURG

## 2025-06-16 RX ORDER — HYDROMORPHONE HYDROCHLORIDE 1 MG/ML
0.2 INJECTION, SOLUTION INTRAMUSCULAR; INTRAVENOUS; SUBCUTANEOUS EVERY 5 MIN PRN
Status: DISCONTINUED | OUTPATIENT
Start: 2025-06-16 | End: 2025-06-16

## 2025-06-16 RX ORDER — SODIUM CHLORIDE, SODIUM LACTATE, POTASSIUM CHLORIDE, CALCIUM CHLORIDE 600; 310; 30; 20 MG/100ML; MG/100ML; MG/100ML; MG/100ML
INJECTION, SOLUTION INTRAVENOUS CONTINUOUS
Status: DISCONTINUED | OUTPATIENT
Start: 2025-06-16 | End: 2025-06-16

## 2025-06-16 RX ORDER — PROCHLORPERAZINE EDISYLATE 5 MG/ML
5 INJECTION INTRAMUSCULAR; INTRAVENOUS EVERY 8 HOURS PRN
Status: DISCONTINUED | OUTPATIENT
Start: 2025-06-16 | End: 2025-06-16

## 2025-06-16 RX ORDER — SODIUM CHLORIDE 9 MG/ML
INJECTION, SOLUTION INTRAVENOUS CONTINUOUS
Status: DISCONTINUED | OUTPATIENT
Start: 2025-06-16 | End: 2025-06-16

## 2025-06-16 RX ORDER — IOPAMIDOL 612 MG/ML
INJECTION, SOLUTION INTRAVASCULAR AS NEEDED
Status: DISCONTINUED | OUTPATIENT
Start: 2025-06-16 | End: 2025-06-16 | Stop reason: HOSPADM

## 2025-06-16 RX ORDER — HYDROMORPHONE HYDROCHLORIDE 1 MG/ML
1 INJECTION, SOLUTION INTRAMUSCULAR; INTRAVENOUS; SUBCUTANEOUS ONCE
Refills: 0 | Status: COMPLETED | OUTPATIENT
Start: 2025-06-16 | End: 2025-06-16

## 2025-06-16 RX ORDER — ONDANSETRON 2 MG/ML
INJECTION INTRAMUSCULAR; INTRAVENOUS AS NEEDED
Status: DISCONTINUED | OUTPATIENT
Start: 2025-06-16 | End: 2025-06-16 | Stop reason: SURG

## 2025-06-16 RX ORDER — LIDOCAINE HYDROCHLORIDE 20 MG/ML
JELLY TOPICAL AS NEEDED
Status: DISCONTINUED | OUTPATIENT
Start: 2025-06-16 | End: 2025-06-16 | Stop reason: HOSPADM

## 2025-06-16 RX ORDER — KETOROLAC TROMETHAMINE 15 MG/ML
15 INJECTION, SOLUTION INTRAMUSCULAR; INTRAVENOUS ONCE
Status: COMPLETED | OUTPATIENT
Start: 2025-06-16 | End: 2025-06-16

## 2025-06-16 RX ORDER — HYDROCODONE BITARTRATE AND ACETAMINOPHEN 5; 325 MG/1; MG/1
1-2 TABLET ORAL EVERY 4 HOURS PRN
Qty: 6 TABLET | Refills: 0 | Status: SHIPPED | OUTPATIENT
Start: 2025-06-16

## 2025-06-16 RX ORDER — SODIUM CHLORIDE, SODIUM LACTATE, POTASSIUM CHLORIDE, CALCIUM CHLORIDE 600; 310; 30; 20 MG/100ML; MG/100ML; MG/100ML; MG/100ML
INJECTION, SOLUTION INTRAVENOUS CONTINUOUS PRN
Status: DISCONTINUED | OUTPATIENT
Start: 2025-06-16 | End: 2025-06-16 | Stop reason: SURG

## 2025-06-16 RX ORDER — HYDROCODONE BITARTRATE AND ACETAMINOPHEN 5; 325 MG/1; MG/1
2 TABLET ORAL EVERY 4 HOURS PRN
Refills: 0 | Status: DISCONTINUED | OUTPATIENT
Start: 2025-06-16 | End: 2025-06-16

## 2025-06-16 RX ADMIN — ONDANSETRON 4 MG: 2 INJECTION INTRAMUSCULAR; INTRAVENOUS at 18:02:00

## 2025-06-16 RX ADMIN — DEXAMETHASONE SODIUM PHOSPHATE 8 MG: 4 MG/ML VIAL (ML) INJECTION at 17:39:00

## 2025-06-16 RX ADMIN — SODIUM CHLORIDE, SODIUM LACTATE, POTASSIUM CHLORIDE, CALCIUM CHLORIDE: 600; 310; 30; 20 INJECTION, SOLUTION INTRAVENOUS at 17:33:00

## 2025-06-16 RX ADMIN — LIDOCAINE HYDROCHLORIDE 80 MG: 10 INJECTION, SOLUTION EPIDURAL; INFILTRATION; INTRACAUDAL; PERINEURAL at 17:36:00

## 2025-06-16 RX ADMIN — KETOROLAC TROMETHAMINE 30 MG: 30 INJECTION, SOLUTION INTRAMUSCULAR; INTRAVENOUS at 18:02:00

## 2025-06-16 RX ADMIN — SODIUM CHLORIDE, SODIUM LACTATE, POTASSIUM CHLORIDE, CALCIUM CHLORIDE: 600; 310; 30; 20 INJECTION, SOLUTION INTRAVENOUS at 17:33:02

## 2025-06-16 RX ADMIN — CEFAZOLIN SODIUM 2 G: 1 INJECTION, POWDER, FOR SOLUTION INTRAMUSCULAR; INTRAVENOUS at 17:40:00

## 2025-06-16 RX ADMIN — SODIUM CHLORIDE, SODIUM LACTATE, POTASSIUM CHLORIDE, CALCIUM CHLORIDE: 600; 310; 30; 20 INJECTION, SOLUTION INTRAVENOUS at 18:10:00

## 2025-06-16 NOTE — OPERATIVE REPORT
[facility]   OPERATIVE REPORT    Austin Mcfarland Patient Status:  Emergency    1990 MRN [id number]   Location [facility] SURGERY Attending Charlene Johnson MD   Hosp Day # 0 [facility] FRANK LOERA        DATE of OPERATION: 2025  SURGEON: Charlene Johnson MD    PREOPERATIVE DIAGNOSIS: Encrusted left ureter stent, Retained left ureter stent   POSTOPERATIVE DIAGNOSIS: same    PROCEDURE PERFORMED:  Cystoscopy, Left ureter stent removal, Left retrograde ureteropyelogram     ANESTHESIA:  General.  FINDINGS: encrusted left ureter stent   EBL: 2 ml   COMPLICATIONS: None.   INDWELLING CATHETER: None   SPECIMEN: None     INDICATIONS: The patient is a 34 year old male, who was seen earlier in the day in clinic for stent removal but was found to have stent encrustation and only a part of the stent was brought down into the bladder and proximal urethra; there was significant resistance and procedure was aborted; patient was sent to ER for further treatment at the [facility]. All risks, benefits, and potential adverse event of the procedure were discussed and a consent form was signed.     TECHNIQUE:  The patient was brought back to the operating room and placed in supine position.  A general anesthesia was induced and a time-out was reviewed.  Preoperative antibiotics were administered.  The patient was prepped and draped in the dorsal lithotomy position.  Sequential compression devices were in place on the lower legs.     The cystoscope was passed into the urethra where a curled distal ureter stent was noted at the bulbar portion of the urethra;  the end of the stent had some remnants of the calcification and was then grasped with forceps, while applying gentle pressure and under live fluoroscopy viewing of the proximal stent in the left distal ureter, the proximal curled portion of the ureteral stent became straight and then we successfully removed the left ureter stent in its entirety.  There were  calcifications all along the stent observed.  The rigid cystoscope was then re-introduced in the bladder and the bladder was evaluated in a systematic manner. There were no lesions, diverticula, or masses noted; no bleeding was observed from the left ureter orifice. The ureteral orrifices were in normal position.  The cystoscope was used to guide an open ended 5 Fr ureter catheter into the left ureteral orifice and water soluble contrast was injected in a retrograde manner.  There was no extravasation of the contrast, no dilation of ureter and no filling defects were observed within the ureter (to suggest ureteral stones), however, the left kidney calyces were observed to be dilated and contrast pooled within the collecting system (this has been previously observed as a result of long-standing / chronic hydronephrosis).  There were no complications during this procedure.  The bladder was emptied of urine at the end of the procedure.     The patient was awakened and taken to the recovery area in stable condition. The patient will be discharged to home.     Charlene Johnosn MD  6/16/2025

## 2025-06-16 NOTE — ED PROVIDER NOTES
Patient Seen in: [facility] Emergency Department        History  Chief Complaint   Patient presents with    Kidney Problem    Groin Pain     Stated Complaint: attempted kidney stent removal in office, reports it got stuck, + pain    Subjective:   HPI     Austin Mcfarland is a 34 year old male who presents with pain and complications related to a kidney stent removal. He was referred by Dr. Gamboa due to complications during stent removal.    He underwent kidney stone surgery on May 23, 2025, during which scar tissue was addressed, stones were removed, and a stent was placed. The stent was scheduled to be removed today, but during the procedure, it got stuck due to calcification, necessitating referral to the emergency department for further management.    He has experienced significant pain since the attempt to remove the stent, describing a constant burning sensation and hematuria. No nausea or vomiting. He has no known allergies to medications and typically receives Toradol and Dilaudid for pain management.    He last consumed water at around 7 AM today and has not eaten since.        Objective:     Past Medical History:    Anxiety    Calculus of kidney    Congenital obstruction of ureteropelvic junction    Depression    Renal disorder    congenital left ureter stricture               Past Surgical History:   Procedure Laterality Date    Cystoscopy,+ureteroscopy Left 07/31/2010    s/p left rpg, left urs, left renoscopy, cysto, with stent placement 7-31-10 at [facility], Dr John Sheffield    Cystoscopy,+ureteroscopy Left 12/03/2010    L URS, Dr. Sheffield    Cystoscopy,+ureteroscopy Left     Cysto, URS, RPG, stent Bladder Stone removal-Dr Wilson    Cystoscopy,+ureteroscopy Left 03/19/2015    no stent placed, [facility] DIONY    Cystoscopy,insert ureteral stent  04/22/2014    Procedure: LITHOTRIPSY WITH CYSTOSCOPY, STENT PLACEMENT;  Surgeon: Dex Jane DO;  Location: [facility]    Cystoscopy,remv  calculus,simple  12/27/2007    Performed by DEBORAH CALDERON at [facility], [facility]    Cystoscopy,remv calculus,simple  05/23/2008    Performed by DEBORAH CALDERON at [facility], [facility]    Cystourethroscopy Left 12/27/2010    cysto stent removal- Dr. Calderon    Cystourethroscopy Left 12/19/2014    Cysto Stent Removal-Dr Jane    Cystourethroscopy,ureter catheter  03/10/2008    Performed by DEBORAH CALDERON at [facility], [facility]    Cystourethroscopy,ureter catheter  04/22/2014    Procedure: LITHOTRIPSY WITH CYSTOSCOPY, STENT PLACEMENT;  Surgeon: Dex Jane DO;  Location: [facility]    Fragmenting of kidney stone  09/25/2007    Performed by DEBORAH CALDERON at [facility]    Fragmenting of kidney stone  09/25/2007    Performed by DEBORAH CALDERON at [facility]    Fragmenting of kidney stone  04/22/2014    Procedure: LITHOTRIPSY WITH CYSTOSCOPY, STENT PLACEMENT;  Surgeon: Dex Jane DO;  Location: [facility]    Ir nephhrostomy tube  2007    Cysto stent removal, nephrostomy tube placement    Laparoscopy, surgical; pyeloplasty  10/11/2007    Left UPJ repair    Lithotripsy Left 06/21/2007    Left ESWL    Other surgical history  10/08/2016    Cysto, Lt RPG, Lt URS & Nephroscopy, Stone Manipulation, Stone Basket & Removal, Stent Placement-Dr. Calderon     Other surgical history      left pcnl cdh    Other surgical history      stent placement cdh    Other surgical history  12/05/2016    cysto stent removal dr calderon    Other surgical history  12/11/2020    Cysto/stent removal Dr. Su    Other surgical history  06/25/2021    CYSTOSCOPY, LEFT DIAGNOSTIC URETEROSCOPY, LEFT RETROGRADE PYELOGRAM, LEFT URETERAL STENT PLACEMENT,    Other surgical history  06/11/2021    CYSTOSCOPY,LEFT URETEROSCOPY,, RETROGRADE PYELOGRAM, LEFT STENT INSERTION    Other surgical history  01/21/2022    Cysto, Lt URS, RPHG, LL, Stone Extraction, Lt Stent Placement -   Milvia    Other surgical history  02/03/2022    Cysto Stent Removal- Dr. Steel    Renal endoscopy  03/10/2008    Performed by DEBORAH KOHLI at [facility]    Special service or report  05/09/2007    Cysto, stone extraction    Special service or report  09/20/2006    Neph tube insertion with endopyelotomy    Special service or report  10/21/2007    Larger ureteral stent placed    Urology surgery procedure unlisted  05/23/2008    Performed by DEBORAH KOHLI at [facility]    X-ray antegrade pyelogram tube  03/10/2008    Performed by DEBORAH KOHLI at [facility]    X-ray retrograde pyelogram  03/10/2008    Performed by DEBORAH KOHLI at [facility]    X-ray retrograde pyelogram  05/23/2008    Performed by DEBORAH KOHLI at [facility]    X-ray retrograde pyelogram  04/22/2014    Procedure: LITHOTRIPSY WITH CYSTOSCOPY, STENT PLACEMENT;  Surgeon: Dex Jane DO;  Location: [facility]                Social History     Socioeconomic History    Marital status: Single   Tobacco Use    Smoking status: Never    Smokeless tobacco: Never   Vaping Use    Vaping status: Never Used   Substance and Sexual Activity    Alcohol use: Yes     Comment: ocassionally    Drug use: Yes     Frequency: 7.0 times per week     Types: Cannabis     Comment: medical     Social Drivers of Health     Food Insecurity: No Food Insecurity (5/23/2025)    NCSS - Food Insecurity     Worried About Running Out of Food in the Last Year: No     Ran Out of Food in the Last Year: No   Transportation Needs: No Transportation Needs (5/23/2025)    NCSS - Transportation     Lack of Transportation: No   Housing Stability: Not At Risk (5/23/2025)    NCSS - Housing/Utilities     Has Housing: Yes     Worried About Losing Housing: No     Unable to Get Utilities: No                                Physical Exam    ED Triage Vitals [06/16/25 1218]   /76   Pulse 87   Resp 18   Temp  98.4 °F (36.9 °C)   Temp src Temporal   SpO2 97 %   O2 Device None (Room air)       Current Vitals:   Vital Signs  BP: (!) 118/94  Pulse: 62  Resp: 18  Temp: 98.4 °F (36.9 °C)  Temp src: Temporal  MAP (mmHg): (!) 103    Oxygen Therapy  SpO2: 100 %  O2 Device: None (Room air)            Physical Exam  Patient is alert orient x 3 appears uncomfortable HEENT exam within normal limits neck there is no JVD lungs are clear cardiovascular exam shows regular rate and rhythm without murmurs abdomen soft with some mild left lower quadrant tenderness to the incisional wounds appear to be healing well no signs of infection no masses or guarding no rebound extremities normal skin is no rash        ED Course  Labs Reviewed   CBC WITH DIFFERENTIAL WITH PLATELET   URINALYSIS WITH CULTURE REFLEX   COMP METABOLIC PANEL (14)   RAINBOW DRAW BLUE          Abnormal Labs Reviewed - No abnormal labs to display  CBC was normal                  MDM     Initial differential diagnoses considered but not limited to includes renal colic urinary tract infection pyelonephritis the pain related to the stent patient has renal colic pain associated with the stent it with inability remove the stent patient be taken to the OR for stent removal.        Medical Decision Making      Disposition and Plan     Clinical Impression:  1. Ureteral stent retained         Disposition:  There is no disposition on file for this visit.  There is no disposition time on file for this visit.    Follow-up:  No follow-up provider specified.        Medications Prescribed:  Current Discharge Medication List                Supplementary Documentation:

## 2025-06-16 NOTE — ED INITIAL ASSESSMENT (HPI)
Patient presents for evaluation of pain and retained stent. He was at his urologist this morning and was having a kidney stent removed and the physician was unable to remove it and sent him here.

## 2025-06-16 NOTE — ANESTHESIA POSTPROCEDURE EVALUATION
[facility]    Austin Mcfarland Patient Status:  Emergency   Age/Gender 34 year old male MRN [id number]   [facility] SURGERY Attending Charlene Johnson MD   Hosp Day # 0 PCP FRANK LOERA       Anesthesia Post-op Note    CYSTOSCOPY, LEFT RETROGRADE PYLEOGRAM,  LEFT STENT REMOVAL    Procedure Summary       Date: 06/16/25 Room / Location:  MAIN OR 07 /  MAIN OR    Anesthesia Start: 1733 Anesthesia Stop: 1811    Procedure: CYSTOSCOPY, LEFT RETROGRADE PYLEOGRAM,  LEFT STENT REMOVAL (Left: Urethra) Diagnosis:       Left ureteral stone      (Left ureteral stone [N20.1])    Surgeons: Charlene Johnson MD Anesthesiologist: Dandre Beclher MD    Anesthesia Type: general ASA Status: 2            Anesthesia Type: general    Vitals Value Taken Time   /68 06/16/25 18:11   Temp 97.4 06/16/25 18:11   Pulse 64 06/16/25 18:11   Resp 14 06/16/25 18:11   SpO2 100 06/16/25 18:11           Patient Location: PACU    Anesthesia Type: general    Airway Patency: patent and extubated    Postop Pain Control: adequate    Mental Status: preanesthetic baseline    Nausea/Vomiting: none    Cardiopulmonary/Hydration status: stable euvolemic    Complications: no apparent anesthesia related complications    Postop vital signs: stable    Dental Exam: Unchanged from Preop    Patient to be discharged from PACU when criteria met.

## 2025-06-16 NOTE — H&P
[facility]  [facility]  H&P Note    Austin Mcfarland Patient Status:  Emergency    1990 MRN [id number]   Location [facility] PRE OP HOLDING Attending Charlene Johnson MD   Hosp Day # 0 PCP FRANK LOERA     Chief complaint:  Left encrusted ureter stent     History of Present Illness:  Austin Mcfarland is a a(n) 34 year old male, seen earlier in the day in clinic for stent removal but was found to have stent encrustation and only a part of the stent was brought down into the bladder and proximal urethra, there was significant resistance and procedure was aborted; patient was sent to ER for further treatment at the [facility] OR.     History:  Past Medical History[1]  Past Surgical History[2]  Family History[3]   reports that he has never smoked. He has never used smokeless tobacco. He reports current alcohol use. He reports current drug use. Frequency: 7.00 times per week. Drug: Cannabis.    Allergies:  Allergies[4]    Medications:  Current Hospital Medications[5]    Review of Systems:  Pertinent items are noted in HPI.    Physical Exam:  /89   Pulse 61   Temp 98.4 °F (36.9 °C) (Temporal)   Resp 22   Ht 6' 1\" (1.854 m)   Wt 170 lb (77.1 kg)   SpO2 99%   BMI 22.43 kg/m²   GENERAL: well developed, well nourished, no apparent distress  EYES: sclera non-icteric, no redness   LUNGS: normal respiratory motion without distress  GI: Abdomen soft without organomegally, no tenderness  NEURO: Alert and Oriented, motor and sensory grossly non-focal   LYMPH:  No appreciable axillary, neck adenopathy  SKIN: No rashes, no discoloration  EXTREMITIES: No appreciable joint deformities     Laboratory Data:  Lab Results   Component Value Date    WBC 5.1 2025    HGB 14.5 2025    HCT 41.0 2025    .0 2025    CREATSERUM 1.01 2025    BUN 9 2025     2025    K 4.2 2025     2025    CO2 29.0 2025    GLU 98 2025    CA 9.4  06/16/2025    ALB 4.8 06/16/2025    ALKPHO 78 06/16/2025    BILT 0.7 06/16/2025    TP 7.4 06/16/2025    AST 15 06/16/2025    ALT 11 06/16/2025           Impression:  Left ureter stent encrustation, retained ureter stent     Recommendations:  Proceed with cystoscopy, left ureteroscopy with stone laser lithotripsy, left retrograde study, and left stent exchange     Charlene Johnson MD  [facility]  6/16/2025           [1]   Past Medical History:   Anxiety    Calculus of kidney    Congenital obstruction of ureteropelvic junction    Depression    Renal disorder    congenital left ureter stricture    [2]   Past Surgical History:  Procedure Laterality Date    Cystoscopy,+ureteroscopy Left 07/31/2010    s/p left rpg, left urs, left renoscopy, cysto, with stent placement 7-31-10 at [facility], Dr John Sheffield    Cystoscopy,+ureteroscopy Left 12/03/2010    L URS, Dr. Sheffield    Cystoscopy,+ureteroscopy Left     Cysto, URS, RPG, stent Bladder Stone removal-Dr Wilson    Cystoscopy,+ureteroscopy Left 03/19/2015    no stent placed, [facility] DIONY    Cystoscopy,insert ureteral stent  04/22/2014    Procedure: LITHOTRIPSY WITH CYSTOSCOPY, STENT PLACEMENT;  Surgeon: Dex Jane DO;  Location: [facility]    Cystoscopy,remv calculus,simple  12/27/2007    Performed by JOHN SHEFFIELD at [facility], [facility]    Cystoscopy,remv calculus,simple  05/23/2008    Performed by JOHN SHEFFIELD at [facility]    Cystourethroscopy Left 12/27/2010    cysto stent removal- Dr. Sheffield    Cystourethroscopy Left 12/19/2014    Cysto Stent Removal-Dr Jane    Cystourethroscopy,ureter catheter  03/10/2008    Performed by JOHN SHEFFIELD at [facility], [facility]    Cystourethroscopy,ureter catheter  04/22/2014    Procedure: LITHOTRIPSY WITH CYSTOSCOPY, STENT PLACEMENT;  Surgeon: Dex Jane DO;  Location: [facility]    Fragmenting of kidney stone  09/25/2007    Performed by JOHN SHEFFIELD at [facility]    Fragmenting of kidney stone  09/25/2007    Performed by DEBORAH CALDERON at [facility]    Fragmenting of kidney stone  04/22/2014    Procedure: LITHOTRIPSY WITH CYSTOSCOPY, STENT PLACEMENT;  Surgeon: Dex Jane DO;  Location: [facility]    Ir nephhrostomy tube  2007    Cysto stent removal, nephrostomy tube placement    Laparoscopy, surgical; pyeloplasty  10/11/2007    Left UPJ repair    Lithotripsy Left 06/21/2007    Left ESWL    Other surgical history  10/08/2016    Cysto, Lt RPG, Lt URS & Nephroscopy, Stone Manipulation, Stone Basket & Removal, Stent Placement-Dr. Calderon     Other surgical history      left pcnl cdh    Other surgical history      stent placement cdh    Other surgical history  12/05/2016    cysto stent removal dr calderon    Other surgical history  12/11/2020    Cysto/stent removal Dr. Su    Other surgical history  06/25/2021    CYSTOSCOPY, LEFT DIAGNOSTIC URETEROSCOPY, LEFT RETROGRADE PYELOGRAM, LEFT URETERAL STENT PLACEMENT,    Other surgical history  06/11/2021    CYSTOSCOPY,LEFT URETEROSCOPY,, RETROGRADE PYELOGRAM, LEFT STENT INSERTION    Other surgical history  01/21/2022    Cysto, Lt URS, RPHG, LL, Stone Extraction, Lt Stent Placement - Dr. Steel    Other surgical history  02/03/2022    Cysto Stent Removal- Dr. Steel    Renal endoscopy  03/10/2008    Performed by DEBORAH CALDERON at [facility]    Special service or report  05/09/2007    Cysto, stone extraction    Special service or report  09/20/2006    Neph tube insertion with endopyelotomy    Special service or report  10/21/2007    Larger ureteral stent placed    Urology surgery procedure unlisted  05/23/2008    Performed by DEBORAH CALDERON at [facility]    X-ray antegrade pyelogram tube  03/10/2008    Performed by DEBORAH CALDERON at [facility]    X-ray retrograde pyelogram  03/10/2008    Performed by DEBORAH CALDERON at [facility]    X-ray  retrograde pyelogram  05/23/2008    Performed by DEBORAH KOHLI at [facility]    X-ray retrograde pyelogram  04/22/2014    Procedure: LITHOTRIPSY WITH CYSTOSCOPY, STENT PLACEMENT;  Surgeon: Dex Jane DO;  Location: [facility]   [3] No family history on file.  [4] No Known Allergies  [5]   Current Facility-Administered Medications:     [COMPLETED] sodium chloride 0.9 % IV bolus 500 mL, 500 mL, Intravenous, Once **FOLLOWED BY** sodium chloride 0.9% infusion, , Intravenous, Continuous

## 2025-06-16 NOTE — ANESTHESIA PROCEDURE NOTES
Airway  Date/Time: 6/16/2025 5:37 PM  Reason: elective      General Information and Staff   Patient location during procedure: OR  Anesthesiologist: Dandre Belcher MD  Performed: anesthesiologist   Performed by: Dandre Belcher MD  Authorized by: Dandre Belcher MD        Indications and Patient Condition  Indications for airway management: anesthesia  Sedation level: deep      Preoxygenated: yesPatient position: sniffing    Mask difficulty assessment: 1 - vent by mask    Final Airway Details    Final airway type: supraglottic airway      Successful airway: classic  Size: 3     Number of attempts at approach: 1

## 2025-06-16 NOTE — ANESTHESIA PREPROCEDURE EVALUATION
PRE-OP EVALUATION    Patient Name: Austin Mcfarland    Admit Diagnosis: Ureteral stent retained [Z96.0]    Pre-op Diagnosis: Left ureteral stone [N20.1]    CYSTOSCOPY, LEFT RETROGRADE PYLEOGRAM, LEFT URETEROSCOPY, LASER LITHOTRIPSY, LEFT STENT EXCHANGE    Anesthesia Procedure: CYSTOSCOPY, LEFT RETROGRADE PYLEOGRAM, LEFT URETEROSCOPY, LASER LITHOTRIPSY, LEFT STENT EXCHANGE (Left: Urethra)    Surgeons and Role:     * Charlene Johnson MD - Primary    Pre-op vitals reviewed.  Temp: 98.4 °F (36.9 °C)  Pulse: 61  Resp: 22  BP: 120/89  SpO2: 99 %  Body mass index is 22.43 kg/m².    Current medications reviewed.  Hospital Medications:  Current Medications[1]    Outpatient Medications:   Prescriptions Prior to Admission[2]    Allergies: Patient has no known allergies.      Anesthesia Evaluation    Patient summary reviewed.    Anesthetic Complications  (-) history of anesthetic complications         GI/Hepatic/Renal             (+) chronic renal disease                    Cardiovascular    Negative cardiovascular ROS.    Exercise tolerance: good     MET: >4                                           Endo/Other    Negative endo/other ROS.                              Pulmonary  Comment: Smokes cannabis on daily basis: denies any in the past 2 hours  Negative pulmonary ROS.                       Neuro/Psych    Negative neuro/psych ROS.                          Patient Active Problem List:     Ureteropelvic junction obstruct, congenital     Recurrent kidney stones     Hydronephrosis of left kidney     Flank pain     Hydronephrosis, unspecified hydronephrosis type     Retroperitoneal fibrosis     Ureteral stent retained           Past Surgical History[3]  Social Hx on file[4]  History   Drug Use    Frequency: 7.0 times per week    Types: Cannabis     Comment: medical     Available pre-op labs reviewed.  Lab Results   Component Value Date    WBC 5.1 06/16/2025    RBC 4.63 06/16/2025    HGB 14.5 06/16/2025    HCT 41.0 06/16/2025     MCV 88.6 06/16/2025    MCH 31.3 06/16/2025    MCHC 35.4 06/16/2025    RDW 12.1 06/16/2025    .0 06/16/2025     Lab Results   Component Value Date     06/16/2025    K 4.2 06/16/2025     06/16/2025    CO2 29.0 06/16/2025    BUN 9 06/16/2025    CREATSERUM 1.01 06/16/2025    GLU 98 06/16/2025    CA 9.4 06/16/2025            Airway      Mallampati: I  Mouth opening: >3 FB  TM distance: 4 - 6 cm  Neck ROM: full Cardiovascular    Cardiovascular exam normal.  Rhythm: regular  Rate: normal     Dental  Comment: No gross abnormalities noted on exam. Patient denies any loose/missing/cracked teeth.              Pulmonary    Pulmonary exam normal.                 Other findings              ASA: 2   Plan: general  NPO status verified and patient meets guidelines.        Comment: I explained the intrinsics of general anesthesia to Austin Mcfarland, common downsides like sleepiness, PONV, sore throat and hoarseness from airway manipulation, post-operative pain/discomfort, as well as those rare incidents like aspiration, dental /lip injury, corneal abrasion, pressure/nerve injuries from positioning, and exceedingly rare events like intraoperative awareness, allergic/febrile reactions, and life-threatening cardiopulmonary events. All questions were answered and patient demonstrated understanding of realistic expectations and risks of undergoing general anesthesia. Informed consent was signed by patient.        Plan/risks discussed with: patient  Use of blood product(s) discussed with: patient    Consented to blood products.          Present on Admission:  **None**             [1]    [COMPLETED] ketorolac (Toradol) 15 MG/ML injection 15 mg  15 mg Intravenous Once    [COMPLETED] sodium chloride 0.9 % IV bolus 500 mL  500 mL Intravenous Once    Followed by    sodium chloride 0.9% infusion   Intravenous Continuous    [COMPLETED] HYDROmorphone (Dilaudid) 1 MG/ML injection 1 mg  1 mg Intravenous Once    [COMPLETED]  HYDROmorphone (Dilaudid) 1 MG/ML injection 1 mg  1 mg Intravenous Once   [2]   Medications Prior to Admission   Medication Sig Dispense Refill Last Dose/Taking    [] HYDROcodone-acetaminophen  MG Oral Tab Take 1 tablet by mouth every 6 (six) hours as needed for Pain. 15 tablet 0     acetaminophen 500 MG Oral Tab Take 1-2 tablets (500-1,000 mg total) by mouth every 4 (four) hours as needed for Pain.      [3]   Past Surgical History:  Procedure Laterality Date    Cystoscopy,+ureteroscopy Left 2010    s/p left rpg, left urs, left renoscopy, cysto, with stent placement 7-31-10 at [facility], Dr John Sheffield    Cystoscopy,+ureteroscopy Left 2010    L URS, Dr. Sheffield    Cystoscopy,+ureteroscopy Left     Cysto, URS, RPG, stent Bladder Stone removal-Dr Wilson    Cystoscopy,+ureteroscopy Left 2015    no stent placed, [facility] DIONY    Cystoscopy,insert ureteral stent  2014    Procedure: LITHOTRIPSY WITH CYSTOSCOPY, STENT PLACEMENT;  Surgeon: Dex Jane DO;  Location: [facility]    Cystoscopy,remv calculus,simple  2007    Performed by JOHN SHEFFIELD at [facility]    Cystoscopy,remv calculus,simple  2008    Performed by JOHN SHEFFIELD at [facility]    Cystourethroscopy Left 2010    cysto stent removal- Dr. Sheffield    Cystourethroscopy Left 2014    Cysto Stent Removal-Dr Jane    Cystourethroscopy,ureter catheter  03/10/2008    Performed by JOHN SHEFFIELD at [facility]    Cystourethroscopy,ureter catheter  2014    Procedure: LITHOTRIPSY WITH CYSTOSCOPY, STENT PLACEMENT;  Surgeon: Dex Jane DO;  Location: [facility]    Fragmenting of kidney stone  2007    Performed by JOHN SHEFFIELD at [facility]    Fragmenting of kidney stone  2007    Performed by JOHN SHEFFIELD at [facility]    Fragmenting of kidney stone  2014    Procedure: LITHOTRIPSY WITH  CYSTOSCOPY, STENT PLACEMENT;  Surgeon: Dex Jane DO;  Location: [facility], [facility]    Ir nephhrostomy tube  2007    Cysto stent removal, nephrostomy tube placement    Laparoscopy, surgical; pyeloplasty  10/11/2007    Left UPJ repair    Lithotripsy Left 06/21/2007    Left ESWL    Other surgical history  10/08/2016    Cysto, Lt RPG, Lt URS & Nephroscopy, Stone Manipulation, Stone Basket & Removal, Stent Placement-Dr. Calderon     Other surgical history      left pcnl cdh    Other surgical history      stent placement cdh    Other surgical history  12/05/2016    cysto stent removal dr calderon    Other surgical history  12/11/2020    Cysto/stent removal Dr. Su    Other surgical history  06/25/2021    CYSTOSCOPY, LEFT DIAGNOSTIC URETEROSCOPY, LEFT RETROGRADE PYELOGRAM, LEFT URETERAL STENT PLACEMENT,    Other surgical history  06/11/2021    CYSTOSCOPY,LEFT URETEROSCOPY,, RETROGRADE PYELOGRAM, LEFT STENT INSERTION    Other surgical history  01/21/2022    Cysto, Lt URS, RPHG, LL, Stone Extraction, Lt Stent Placement - Dr. Steel    Other surgical history  02/03/2022    Cysto Stent Removal- Dr. tSeel    Renal endoscopy  03/10/2008    Performed by DEBORAH CALDERON at [facility], [facility]    Special service or report  05/09/2007    Cysto, stone extraction    Special service or report  09/20/2006    Neph tube insertion with endopyelotomy    Special service or report  10/21/2007    Larger ureteral stent placed    Urology surgery procedure unlisted  05/23/2008    Performed by DEBORAH CALDERON at [facility]    X-ray antegrade pyelogram tube  03/10/2008    Performed by DEBORAH CALDERON at [facility], [facility]    X-ray retrograde pyelogram  03/10/2008    Performed by DEBORAH CALDERON at [facility], [facility]    X-ray retrograde pyelogram  05/23/2008    Performed by DEBORAH CALDERON at [facility], [facility]    X-ray retrograde pyelogram  04/22/2014    Procedure: LITHOTRIPSY WITH CYSTOSCOPY, STENT  PLACEMENT;  Surgeon: Dex Jane DO;  Location: [facility] [place], [facility]   [4]   Social History  Socioeconomic History    Marital status: Single   Tobacco Use    Smoking status: Never    Smokeless tobacco: Never   Vaping Use    Vaping status: Never Used   Substance and Sexual Activity    Alcohol use: Yes     Comment: ocassionally    Drug use: Yes     Frequency: 7.0 times per week     Types: Cannabis     Comment: medical

## 2025-06-16 NOTE — DISCHARGE INSTRUCTIONS
Home Care Instructions Following Your Retrograde Pyelogram     Austin-  We hope you were pleased with your care at [facility].  We wish you the best outcome and overall experience with your operation.  These instructions will help to minimize pain and improve the likelihood of a successful result.    You Should Expect:  Bloody urine for one week  Burning with urination for one week    Bathing/Showers  You can resume normal bathing, showers, swimming, and hot tubs tomorrow    Pain Medication:  Dr. Johnson will likely prescribe a narcotic medication (Norco, Vicodin, or Tylenol #3) to alleviate your pain.  Take one or two tablets every four hours as needed for pain.  Do not exceed 8 (eight) tablets each day  Do not take pain medication, if you do not have pain.  Pain medication can cause constipation.  Use stool softeners, such as Milk of Magnesia or Colace.  Fruit ( prunes, apricots) can also help to prevent constipation    Over-The-Counter Medication:  Azo (phenazopyridine hydrochloride 97.5 mg) can help to alleviate burning with urination and bladder spasms  Take 2 tablets 3 times daily with meals as needed   Take with a full glass of water  You can also take OTC Tylenol and/or ibuprofen as needed for pain.  Take medication as directed on the bottle    Home Medication  Resume your home medications as instructed    Diet  Resume your normal diet    Activity  No strenuous activity or heavy lifting for one month.  Refrain from physical exercise or gym workouts for one month  You can go up and down the stairs as tolerated.  Use common sense  You cannot return to work, if your work requires strenuous activity.    Driving  Do not drive, if you are taking pain medication.    Return to Work or School  You can return to work/school tomorrow as tolerated, if your work does not involve strenuous activity.  Contact Dr. Johnson's office, if you need a medical note.     Follow-up Appointment with   Alex  Call Dr. Johnson's office tomorrow for an appointment as discussed .    Verify the appointment date, time, and office location when you call.  Dr. Johnson will remove your urine catheter in the office, assess your progress, and discuss any additional concerns.    Questions or Concerns  Call Dr. Johnson immediately if you have any of the following:   Chills or fever above 100.4°F (38°C)   Increased spasms (uncontrollable twitching) in your legs, abdomen, or bladder (occasional mild spasms are normal)   Nausea and vomiting   Aching in the lower back   Large blood clots in your urine   Unable to urinate  If your call is made after office hours, a physician's assistant or nurse practitioner will be available to help you.  There is always a surgeon covering Dr. Johnson's patients, if he is unavailable.    Austin  Thank you for coming to [facility] for your operation.  The nurses and the anesthesiologist try very hard to make sure you receive the best care possible.  Your trust in them is greatly appreciated.    Thanks so much,  Dr. Johnson      You received a drug called Toradol which is an Anti Inflammatory at: 6:02pm.  If you are allowed to take Anti inflammatories:    Do not take any Anti Inflammatory like Motrin, Aleve or Ibuprophen until after: 12:02am.  Please report any suspected allergic reactions or bleeding issues to your doctor

## 2025-06-23 ENCOUNTER — APPOINTMENT (OUTPATIENT)
Dept: CT IMAGING | Facility: HOSPITAL | Age: 35
End: 2025-06-23
Attending: STUDENT IN AN ORGANIZED HEALTH CARE EDUCATION/TRAINING PROGRAM
Payer: COMMERCIAL

## 2025-06-23 ENCOUNTER — APPOINTMENT (OUTPATIENT)
Dept: GENERAL RADIOLOGY | Facility: HOSPITAL | Age: 35
End: 2025-06-23
Attending: UROLOGY
Payer: COMMERCIAL

## 2025-06-23 ENCOUNTER — HOSPITAL ENCOUNTER (OUTPATIENT)
Facility: HOSPITAL | Age: 35
Setting detail: OBSERVATION
Discharge: HOME | End: 2025-06-23
Attending: STUDENT IN AN ORGANIZED HEALTH CARE EDUCATION/TRAINING PROGRAM | Admitting: HOSPITALIST
Payer: COMMERCIAL

## 2025-06-23 ENCOUNTER — ANESTHESIA EVENT (OUTPATIENT)
Dept: SURGERY | Facility: HOSPITAL | Age: 35
End: 2025-06-23
Payer: COMMERCIAL

## 2025-06-23 ENCOUNTER — APPOINTMENT (OUTPATIENT)
Dept: GENERAL RADIOLOGY | Facility: HOSPITAL | Age: 35
End: 2025-06-23
Attending: STUDENT IN AN ORGANIZED HEALTH CARE EDUCATION/TRAINING PROGRAM
Payer: COMMERCIAL

## 2025-06-23 ENCOUNTER — ANESTHESIA (OUTPATIENT)
Dept: SURGERY | Facility: HOSPITAL | Age: 35
End: 2025-06-23
Payer: COMMERCIAL

## 2025-06-23 VITALS
DIASTOLIC BLOOD PRESSURE: 85 MMHG | SYSTOLIC BLOOD PRESSURE: 127 MMHG | OXYGEN SATURATION: 97 % | WEIGHT: 170 LBS | HEIGHT: 73.03 IN | HEART RATE: 70 BPM | BODY MASS INDEX: 22.53 KG/M2 | TEMPERATURE: 98 F | RESPIRATION RATE: 16 BRPM

## 2025-06-23 DIAGNOSIS — R10.9 LEFT FLANK PAIN: ICD-10-CM

## 2025-06-23 DIAGNOSIS — N13.30 HYDRONEPHROSIS OF LEFT KIDNEY: Primary | ICD-10-CM

## 2025-06-23 LAB
ALBUMIN SERPL-MCNC: 5 G/DL (ref 3.2–4.8)
ALBUMIN/GLOB SERPL: 1.8 {RATIO} (ref 1–2)
ALP LIVER SERPL-CCNC: 82 U/L (ref 45–117)
ALT SERPL-CCNC: 13 U/L (ref 10–49)
ANION GAP SERPL CALC-SCNC: 6 MMOL/L (ref 0–18)
AST SERPL-CCNC: 16 U/L (ref ?–34)
BASOPHILS # BLD AUTO: 0.02 X10(3) UL (ref 0–0.2)
BASOPHILS NFR BLD AUTO: 0.4 %
BILIRUB SERPL-MCNC: 0.7 MG/DL (ref 0.3–1.2)
BILIRUB UR QL STRIP.AUTO: NEGATIVE
BUN BLD-MCNC: 12 MG/DL (ref 9–23)
CALCIUM BLD-MCNC: 9.6 MG/DL (ref 8.7–10.6)
CHLORIDE SERPL-SCNC: 104 MMOL/L (ref 98–112)
CLARITY UR REFRACT.AUTO: CLEAR
CO2 SERPL-SCNC: 30 MMOL/L (ref 21–32)
COLOR UR AUTO: YELLOW
CREAT BLD-MCNC: 1.07 MG/DL (ref 0.7–1.3)
EGFRCR SERPLBLD CKD-EPI 2021: 94 ML/MIN/1.73M2 (ref 60–?)
EOSINOPHIL # BLD AUTO: 0.19 X10(3) UL (ref 0–0.7)
EOSINOPHIL NFR BLD AUTO: 3.8 %
ERYTHROCYTE [DISTWIDTH] IN BLOOD BY AUTOMATED COUNT: 12.1 %
GLOBULIN PLAS-MCNC: 2.8 G/DL (ref 2–3.5)
GLUCOSE BLD-MCNC: 109 MG/DL (ref 70–99)
GLUCOSE UR STRIP.AUTO-MCNC: NEGATIVE MG/DL
HCT VFR BLD AUTO: 43.6 % (ref 39–53)
HGB BLD-MCNC: 15.5 G/DL (ref 13–17.5)
IMM GRANULOCYTES # BLD AUTO: 0 X10(3) UL (ref 0–1)
IMM GRANULOCYTES NFR BLD: 0 %
KETONES UR STRIP.AUTO-MCNC: NEGATIVE MG/DL
LEUKOCYTE ESTERASE UR QL STRIP.AUTO: NEGATIVE
LIPASE SERPL-CCNC: 42 U/L (ref 12–53)
LYMPHOCYTES # BLD AUTO: 1.71 X10(3) UL (ref 1–4)
LYMPHOCYTES NFR BLD AUTO: 33.9 %
MCH RBC QN AUTO: 30.9 PG (ref 26–34)
MCHC RBC AUTO-ENTMCNC: 35.6 G/DL (ref 31–37)
MCV RBC AUTO: 87 FL (ref 80–100)
MONOCYTES # BLD AUTO: 0.34 X10(3) UL (ref 0.1–1)
MONOCYTES NFR BLD AUTO: 6.7 %
NEUTROPHILS # BLD AUTO: 2.78 X10 (3) UL (ref 1.5–7.7)
NEUTROPHILS # BLD AUTO: 2.78 X10(3) UL (ref 1.5–7.7)
NEUTROPHILS NFR BLD AUTO: 55.2 %
NITRITE UR QL STRIP.AUTO: NEGATIVE
OSMOLALITY SERPL CALC.SUM OF ELEC: 290 MOSM/KG (ref 275–295)
PH UR STRIP.AUTO: 7.5 [PH] (ref 5–8)
PLATELET # BLD AUTO: 207 10(3)UL (ref 150–450)
POTASSIUM SERPL-SCNC: 4 MMOL/L (ref 3.5–5.1)
PROT SERPL-MCNC: 7.8 G/DL (ref 5.7–8.2)
PROT UR STRIP.AUTO-MCNC: NEGATIVE MG/DL
RBC # BLD AUTO: 5.01 X10(6)UL (ref 4.3–5.7)
RBC UR QL AUTO: NEGATIVE
SODIUM SERPL-SCNC: 140 MMOL/L (ref 136–145)
SP GR UR STRIP.AUTO: 1.02 (ref 1–1.03)
UROBILINOGEN UR STRIP.AUTO-MCNC: 0.2 MG/DL
WBC # BLD AUTO: 5 X10(3) UL (ref 4–11)

## 2025-06-23 PROCEDURE — 71045 X-RAY EXAM CHEST 1 VIEW: CPT | Performed by: STUDENT IN AN ORGANIZED HEALTH CARE EDUCATION/TRAINING PROGRAM

## 2025-06-23 PROCEDURE — 74177 CT ABD & PELVIS W/CONTRAST: CPT | Performed by: STUDENT IN AN ORGANIZED HEALTH CARE EDUCATION/TRAINING PROGRAM

## 2025-06-23 PROCEDURE — 99236 HOSP IP/OBS SAME DATE HI 85: CPT | Performed by: HOSPITALIST

## 2025-06-23 DEVICE — URETERAL STENT
Type: IMPLANTABLE DEVICE | Site: URETER | Status: FUNCTIONAL
Brand: ASCERTA™

## 2025-06-23 RX ORDER — LIDOCAINE HYDROCHLORIDE 20 MG/ML
JELLY TOPICAL AS NEEDED
Status: DISCONTINUED | OUTPATIENT
Start: 2025-06-23 | End: 2025-06-23 | Stop reason: HOSPADM

## 2025-06-23 RX ORDER — SODIUM CHLORIDE, SODIUM LACTATE, POTASSIUM CHLORIDE, CALCIUM CHLORIDE 600; 310; 30; 20 MG/100ML; MG/100ML; MG/100ML; MG/100ML
INJECTION, SOLUTION INTRAVENOUS CONTINUOUS PRN
Status: DISCONTINUED | OUTPATIENT
Start: 2025-06-23 | End: 2025-06-23 | Stop reason: SURG

## 2025-06-23 RX ORDER — BISACODYL 10 MG
10 SUPPOSITORY, RECTAL RECTAL
Status: DISCONTINUED | OUTPATIENT
Start: 2025-06-23 | End: 2025-06-24

## 2025-06-23 RX ORDER — NALOXONE HYDROCHLORIDE 0.4 MG/ML
0.08 INJECTION, SOLUTION INTRAMUSCULAR; INTRAVENOUS; SUBCUTANEOUS AS NEEDED
Status: DISCONTINUED | OUTPATIENT
Start: 2025-06-23 | End: 2025-06-23 | Stop reason: HOSPADM

## 2025-06-23 RX ORDER — SODIUM PHOSPHATE, DIBASIC AND SODIUM PHOSPHATE, MONOBASIC 7; 19 G/230ML; G/230ML
1 ENEMA RECTAL ONCE AS NEEDED
Status: DISCONTINUED | OUTPATIENT
Start: 2025-06-23 | End: 2025-06-24

## 2025-06-23 RX ORDER — SENNOSIDES 8.6 MG
17.2 TABLET ORAL NIGHTLY PRN
Status: DISCONTINUED | OUTPATIENT
Start: 2025-06-23 | End: 2025-06-24

## 2025-06-23 RX ORDER — PROCHLORPERAZINE EDISYLATE 5 MG/ML
5 INJECTION INTRAMUSCULAR; INTRAVENOUS EVERY 8 HOURS PRN
Status: DISCONTINUED | OUTPATIENT
Start: 2025-06-23 | End: 2025-06-23 | Stop reason: HOSPADM

## 2025-06-23 RX ORDER — PROCHLORPERAZINE EDISYLATE 5 MG/ML
5 INJECTION INTRAMUSCULAR; INTRAVENOUS EVERY 8 HOURS PRN
Status: DISCONTINUED | OUTPATIENT
Start: 2025-06-23 | End: 2025-06-24

## 2025-06-23 RX ORDER — KETOROLAC TROMETHAMINE 15 MG/ML
15 INJECTION, SOLUTION INTRAMUSCULAR; INTRAVENOUS ONCE
Status: COMPLETED | OUTPATIENT
Start: 2025-06-23 | End: 2025-06-23

## 2025-06-23 RX ORDER — SODIUM CHLORIDE, SODIUM LACTATE, POTASSIUM CHLORIDE, CALCIUM CHLORIDE 600; 310; 30; 20 MG/100ML; MG/100ML; MG/100ML; MG/100ML
INJECTION, SOLUTION INTRAVENOUS CONTINUOUS
Status: DISCONTINUED | OUTPATIENT
Start: 2025-06-23 | End: 2025-06-23 | Stop reason: HOSPADM

## 2025-06-23 RX ORDER — ACETAMINOPHEN 500 MG
1000 TABLET ORAL EVERY 4 HOURS PRN
Status: DISCONTINUED | OUTPATIENT
Start: 2025-06-23 | End: 2025-06-24

## 2025-06-23 RX ORDER — OXYBUTYNIN CHLORIDE 5 MG/1
5 TABLET ORAL 4 TIMES DAILY PRN
Qty: 30 TABLET | Refills: 0 | Status: SHIPPED | OUTPATIENT
Start: 2025-06-23

## 2025-06-23 RX ORDER — LIDOCAINE HYDROCHLORIDE 10 MG/ML
INJECTION, SOLUTION EPIDURAL; INFILTRATION; INTRACAUDAL; PERINEURAL AS NEEDED
Status: DISCONTINUED | OUTPATIENT
Start: 2025-06-23 | End: 2025-06-23 | Stop reason: SURG

## 2025-06-23 RX ORDER — ECHINACEA PURPUREA EXTRACT 125 MG
1 TABLET ORAL
Status: DISCONTINUED | OUTPATIENT
Start: 2025-06-23 | End: 2025-06-24

## 2025-06-23 RX ORDER — ACETAMINOPHEN 500 MG
1000 TABLET ORAL ONCE AS NEEDED
Status: DISCONTINUED | OUTPATIENT
Start: 2025-06-23 | End: 2025-06-23 | Stop reason: HOSPADM

## 2025-06-23 RX ORDER — ONDANSETRON 2 MG/ML
4 INJECTION INTRAMUSCULAR; INTRAVENOUS EVERY 6 HOURS PRN
Status: DISCONTINUED | OUTPATIENT
Start: 2025-06-23 | End: 2025-06-24

## 2025-06-23 RX ORDER — ONDANSETRON 2 MG/ML
4 INJECTION INTRAMUSCULAR; INTRAVENOUS ONCE
Status: COMPLETED | OUTPATIENT
Start: 2025-06-23 | End: 2025-06-23

## 2025-06-23 RX ORDER — HYDROMORPHONE HYDROCHLORIDE 1 MG/ML
0.6 INJECTION, SOLUTION INTRAMUSCULAR; INTRAVENOUS; SUBCUTANEOUS EVERY 5 MIN PRN
Status: DISCONTINUED | OUTPATIENT
Start: 2025-06-23 | End: 2025-06-23 | Stop reason: HOSPADM

## 2025-06-23 RX ORDER — MORPHINE SULFATE 4 MG/ML
4 INJECTION, SOLUTION INTRAMUSCULAR; INTRAVENOUS EVERY 2 HOUR PRN
Status: DISCONTINUED | OUTPATIENT
Start: 2025-06-23 | End: 2025-06-24

## 2025-06-23 RX ORDER — HYDROMORPHONE HYDROCHLORIDE 1 MG/ML
0.5 INJECTION, SOLUTION INTRAMUSCULAR; INTRAVENOUS; SUBCUTANEOUS ONCE
Refills: 0 | Status: COMPLETED | OUTPATIENT
Start: 2025-06-23 | End: 2025-06-23

## 2025-06-23 RX ORDER — DEXAMETHASONE SODIUM PHOSPHATE 4 MG/ML
VIAL (ML) INJECTION AS NEEDED
Status: DISCONTINUED | OUTPATIENT
Start: 2025-06-23 | End: 2025-06-23 | Stop reason: SURG

## 2025-06-23 RX ORDER — HYDROMORPHONE HYDROCHLORIDE 1 MG/ML
0.2 INJECTION, SOLUTION INTRAMUSCULAR; INTRAVENOUS; SUBCUTANEOUS EVERY 5 MIN PRN
Status: DISCONTINUED | OUTPATIENT
Start: 2025-06-23 | End: 2025-06-23 | Stop reason: HOSPADM

## 2025-06-23 RX ORDER — MORPHINE SULFATE 2 MG/ML
2 INJECTION, SOLUTION INTRAMUSCULAR; INTRAVENOUS EVERY 2 HOUR PRN
Status: DISCONTINUED | OUTPATIENT
Start: 2025-06-23 | End: 2025-06-24

## 2025-06-23 RX ORDER — HYDROCODONE BITARTRATE AND ACETAMINOPHEN 5; 325 MG/1; MG/1
1 TABLET ORAL ONCE AS NEEDED
Status: DISCONTINUED | OUTPATIENT
Start: 2025-06-23 | End: 2025-06-23 | Stop reason: HOSPADM

## 2025-06-23 RX ORDER — ONDANSETRON 2 MG/ML
4 INJECTION INTRAMUSCULAR; INTRAVENOUS EVERY 6 HOURS PRN
Status: DISCONTINUED | OUTPATIENT
Start: 2025-06-23 | End: 2025-06-23 | Stop reason: HOSPADM

## 2025-06-23 RX ORDER — HYDROMORPHONE HYDROCHLORIDE 1 MG/ML
1 INJECTION, SOLUTION INTRAMUSCULAR; INTRAVENOUS; SUBCUTANEOUS ONCE
Refills: 0 | Status: COMPLETED | OUTPATIENT
Start: 2025-06-23 | End: 2025-06-23

## 2025-06-23 RX ORDER — HYDROMORPHONE HYDROCHLORIDE 1 MG/ML
INJECTION, SOLUTION INTRAMUSCULAR; INTRAVENOUS; SUBCUTANEOUS
Status: COMPLETED
Start: 2025-06-23 | End: 2025-06-23

## 2025-06-23 RX ORDER — IOPAMIDOL 612 MG/ML
INJECTION, SOLUTION INTRAVASCULAR AS NEEDED
Status: DISCONTINUED | OUTPATIENT
Start: 2025-06-23 | End: 2025-06-23 | Stop reason: HOSPADM

## 2025-06-23 RX ORDER — CEPHALEXIN 500 MG/1
500 CAPSULE ORAL 3 TIMES DAILY
Qty: 15 CAPSULE | Refills: 0 | Status: SHIPPED | OUTPATIENT
Start: 2025-06-23 | End: 2025-06-25 | Stop reason: CLARIF

## 2025-06-23 RX ORDER — POLYETHYLENE GLYCOL 3350 17 G/17G
17 POWDER, FOR SOLUTION ORAL DAILY PRN
Status: DISCONTINUED | OUTPATIENT
Start: 2025-06-23 | End: 2025-06-24

## 2025-06-23 RX ORDER — ONDANSETRON 2 MG/ML
INJECTION INTRAMUSCULAR; INTRAVENOUS AS NEEDED
Status: DISCONTINUED | OUTPATIENT
Start: 2025-06-23 | End: 2025-06-23 | Stop reason: SURG

## 2025-06-23 RX ORDER — HYDROMORPHONE HYDROCHLORIDE 1 MG/ML
0.4 INJECTION, SOLUTION INTRAMUSCULAR; INTRAVENOUS; SUBCUTANEOUS EVERY 5 MIN PRN
Status: DISCONTINUED | OUTPATIENT
Start: 2025-06-23 | End: 2025-06-23 | Stop reason: HOSPADM

## 2025-06-23 RX ORDER — HYDROCODONE BITARTRATE AND ACETAMINOPHEN 5; 325 MG/1; MG/1
2 TABLET ORAL ONCE AS NEEDED
Status: DISCONTINUED | OUTPATIENT
Start: 2025-06-23 | End: 2025-06-23 | Stop reason: HOSPADM

## 2025-06-23 RX ADMIN — DEXAMETHASONE SODIUM PHOSPHATE 4 MG: 4 MG/ML VIAL (ML) INJECTION at 18:43:00

## 2025-06-23 RX ADMIN — ONDANSETRON 4 MG: 2 INJECTION INTRAMUSCULAR; INTRAVENOUS at 18:43:00

## 2025-06-23 RX ADMIN — SODIUM CHLORIDE, SODIUM LACTATE, POTASSIUM CHLORIDE, CALCIUM CHLORIDE: 600; 310; 30; 20 INJECTION, SOLUTION INTRAVENOUS at 18:21:00

## 2025-06-23 RX ADMIN — LIDOCAINE HYDROCHLORIDE 50 MG: 10 INJECTION, SOLUTION EPIDURAL; INFILTRATION; INTRACAUDAL; PERINEURAL at 18:28:00

## 2025-06-23 NOTE — PLAN OF CARE
Admission Navigator completed.  Med Rec completed. Only takes PRN medications.  Ambulates independently.  Lives at home with parents.  Alert and oriented x4.

## 2025-06-23 NOTE — ANESTHESIA PREPROCEDURE EVALUATION
PRE-OP EVALUATION    Patient Name: Austin Mcfarland    Admit Diagnosis: Left flank pain [R10.9]  Hydronephrosis of left kidney [N13.30]    Pre-op Diagnosis: Ureteral calculi [N20.1]    CYSTOSCOPY, LEFT STENT INSERTION    Anesthesia Procedure: CYSTOSCOPY, LEFT STENT INSERTION (Left: Ureter)    Surgeons and Role:     * Quirino Toth MD - Primary    Pre-op vitals reviewed.  Temp: 97.8 °F (36.6 °C)  Pulse: 59  Resp: 17  BP: 129/82  SpO2: 100 %  Body mass index is 22.41 kg/m².    Current medications reviewed.  Hospital Medications:  Current Medications[1]    Outpatient Medications:   Prescriptions Prior to Admission[2]    Allergies: Patient has no known allergies.      Anesthesia Evaluation        Anesthetic Complications  (-) history of anesthetic complications         GI/Hepatic/Renal                                 Cardiovascular                                                       Endo/Other                                  Pulmonary                           Neuro/Psych      (+) depression                        Hydronephrosis d/t congenital issue        Past Surgical History[3]  Social Hx on file[4]  History   Drug Use    Frequency: 7.0 times per week    Types: Cannabis     Comment: medical     Available pre-op labs reviewed.  Lab Results   Component Value Date    WBC 5.0 06/23/2025    RBC 5.01 06/23/2025    HGB 15.5 06/23/2025    HCT 43.6 06/23/2025    MCV 87.0 06/23/2025    MCH 30.9 06/23/2025    MCHC 35.6 06/23/2025    RDW 12.1 06/23/2025    .0 06/23/2025     Lab Results   Component Value Date     06/23/2025    K 4.0 06/23/2025     06/23/2025    CO2 30.0 06/23/2025    BUN 12 06/23/2025    CREATSERUM 1.07 06/23/2025     (H) 06/23/2025    CA 9.6 06/23/2025            Airway      Mallampati: II  Mouth opening: 3 FB  TM distance: 4 - 6 cm  Neck ROM: full Cardiovascular             Dental             Pulmonary                     Other findings              ASA: 2   Plan:  general  NPO status verified and patient meets guidelines.    Post-procedure pain management plan discussed with surgeon and patient.      Plan/risks discussed with: patient                Present on Admission:  **None**             [1]    [COMPLETED] ondansetron (Zofran) 4 MG/2ML injection 4 mg  4 mg Intravenous Once    [COMPLETED] sodium chloride 0.9 % IV bolus 1,000 mL  1,000 mL Intravenous Once    [COMPLETED] HYDROmorphone (Dilaudid) 1 MG/ML injection 0.5 mg  0.5 mg Intravenous Once    [COMPLETED] iopamidol 76% (ISOVUE-370) injection for power injector  100 mL Intravenous ONCE PRN    [COMPLETED] ketorolac (Toradol) 15 MG/ML injection 15 mg  15 mg Intravenous Once    [COMPLETED] HYDROmorphone (Dilaudid) 1 MG/ML injection 1 mg  1 mg Intravenous Once    [COMPLETED] HYDROmorphone (Dilaudid) 1 MG/ML injection 1 mg  1 mg Intravenous Once    ceFAZolin (Ancef) 2g in 10mL IV syringe premix  2 g Intravenous On Call to OR    [Transfer Hold] acetaminophen (Tylenol Extra Strength) tab 1,000 mg  1,000 mg Oral Q4H PRN    [Transfer Hold] melatonin tab 3 mg  3 mg Oral Nightly PRN    [Transfer Hold] glycerin-hypromellose- (Artificial Tears) 0.2-0.2-1 % ophthalmic solution 1 drop  1 drop Both Eyes QID PRN    [Transfer Hold] sodium chloride (Saline Mist) 0.65 % nasal solution 1 spray  1 spray Each Nare Q3H PRN    [Transfer Hold] ondansetron (Zofran) 4 MG/2ML injection 4 mg  4 mg Intravenous Q6H PRN    [Transfer Hold] prochlorperazine (Compazine) 10 MG/2ML injection 5 mg  5 mg Intravenous Q8H PRN    [Transfer Hold] polyethylene glycol (PEG 3350) (Miralax) 17 g oral packet 17 g  17 g Oral Daily PRN    [Transfer Hold] sennosides (Senokot) tab 17.2 mg  17.2 mg Oral Nightly PRN    [Transfer Hold] bisacodyl (Dulcolax) 10 MG rectal suppository 10 mg  10 mg Rectal Daily PRN    [Transfer Hold] fleet enema (Fleet) rectal enema 133 mL  1 enema Rectal Once PRN    [Transfer Hold] morphINE PF 2 MG/ML injection 2 mg  2 mg Intravenous Q2H  PRN    Or    [Transfer Hold] morphINE PF 4 MG/ML injection 4 mg  4 mg Intravenous Q2H PRN   [2]   Medications Prior to Admission   Medication Sig Dispense Refill Last Dose/Taking    HYDROcodone-acetaminophen 5-325 MG Oral Tab Take 1-2 tablets by mouth every 4 (four) hours as needed for Pain. 6 tablet 0 2025 Morning    phenazopyridine 100 MG Oral Tab Take 1 tablet (100 mg total) by mouth 3 (three) times daily as needed. 10 tablet 1 Past Week    acetaminophen 500 MG Oral Tab Take 1-2 tablets (500-1,000 mg total) by mouth every 4 (four) hours as needed for Pain.   2025 Morning    Naloxone HCl 4 MG/0.1ML Nasal Liquid 4 mg by Nasal route as needed. If patient remains unresponsive, repeat dose in other nostril 2-5 minutes after first dose. 1 kit 0     [] HYDROcodone-acetaminophen  MG Oral Tab Take 1 tablet by mouth every 6 (six) hours as needed for Pain. 15 tablet 0    [3]   Past Surgical History:  Procedure Laterality Date    Cystoscopy,+ureteroscopy Left 2010    s/p left rpg, left urs, left renoscopy, cysto, with stent placement 7-31-10 at [facility], Dr John Sheffield    Cystoscopy,+ureteroscopy Left 2010    L URS, Dr. Sheffield    Cystoscopy,+ureteroscopy Left     Cysto, URS, RPG, stent Bladder Stone removal-Dr Wilson    Cystoscopy,+ureteroscopy Left 2015    no stent placed, [facility] DIONY    Cystoscopy,insert ureteral stent  2014    Procedure: LITHOTRIPSY WITH CYSTOSCOPY, STENT PLACEMENT;  Surgeon: Dex Jane DO;  Location: [facility] [place], [facility]    Cystoscopy,remv calculus,simple  2007    Performed by JOHN SHEFFIELD at [facility], [facility]    Cystoscopy,remv calculus,simple  2008    Performed by JOHN SHEFFIELD at [facility]    Cystourethroscopy Left 2010    cysto stent removal- Dr. Sheffield    Cystourethroscopy Left 2014    Cysto Stent Removal-Dr Jane    Cystourethroscopy,ureter catheter  03/10/2008    Performed by JOHN SHEFFIELD at [facility]  [facility], [facility]    Cystourethroscopy,ureter catheter  04/22/2014    Procedure: LITHOTRIPSY WITH CYSTOSCOPY, STENT PLACEMENT;  Surgeon: Dex Jane DO;  Location: [facility]    Fragmenting of kidney stone  09/25/2007    Performed by DEBORAH CALDERON at [facility]    Fragmenting of kidney stone  09/25/2007    Performed by DEBORAH CALDERON at [facility], [facility]    Fragmenting of kidney stone  04/22/2014    Procedure: LITHOTRIPSY WITH CYSTOSCOPY, STENT PLACEMENT;  Surgeon: Dex Jane DO;  Location: [facility]    Ir nephhrostomy tube  2007    Cysto stent removal, nephrostomy tube placement    Laparoscopy, surgical; pyeloplasty  10/11/2007    Left UPJ repair    Lithotripsy Left 06/21/2007    Left ESWL    Other surgical history  10/08/2016    Cysto, Lt RPG, Lt URS & Nephroscopy, Stone Manipulation, Stone Basket & Removal, Stent Placement-Dr. Calderon     Other surgical history      left pcnl cdh    Other surgical history      stent placement cdh    Other surgical history  12/05/2016    cysto stent removal dr calderon    Other surgical history  12/11/2020    Cysto/stent removal Dr. Su    Other surgical history  06/25/2021    CYSTOSCOPY, LEFT DIAGNOSTIC URETEROSCOPY, LEFT RETROGRADE PYELOGRAM, LEFT URETERAL STENT PLACEMENT,    Other surgical history  06/11/2021    CYSTOSCOPY,LEFT URETEROSCOPY,, RETROGRADE PYELOGRAM, LEFT STENT INSERTION    Other surgical history  01/21/2022    Cysto, Lt URS, RPHG, LL, Stone Extraction, Lt Stent Placement - Dr. Steel    Other surgical history  02/03/2022    Cysto Stent Removal- Dr. Steel    Renal endoscopy  03/10/2008    Performed by DEBORAH CALDERON at [facility]    Special service or report  05/09/2007    Cysto, stone extraction    Special service or report  09/20/2006    Neph tube insertion with endopyelotomy    Special service or report  10/21/2007    Larger ureteral stent placed    Urology surgery procedure  unlisted  05/23/2008    Performed by DEBORAH KOHLI at [facility]    X-ray antegrade pyelogram tube  03/10/2008    Performed by DEBORAH KOHLI at [facility]    X-ray retrograde pyelogram  03/10/2008    Performed by DEBORAH KOHLI at [facility]    X-ray retrograde pyelogram  05/23/2008    Performed by DEBORAH KOHLI at [facility]    X-ray retrograde pyelogram  04/22/2014    Procedure: LITHOTRIPSY WITH CYSTOSCOPY, STENT PLACEMENT;  Surgeon: Dex Jane DO;  Location: [facility]   [4]   Social History  Socioeconomic History    Marital status: Single   Tobacco Use    Smoking status: Never    Smokeless tobacco: Never   Vaping Use    Vaping status: Never Used   Substance and Sexual Activity    Alcohol use: Yes     Comment: ocassionally    Drug use: Yes     Frequency: 7.0 times per week     Types: Cannabis     Comment: medical

## 2025-06-23 NOTE — H&P
[facility]     Austin Mcfarland Patient Status:  Observation    1990 MRN [id number]   Location [facility] 3NW-A Attending Huy Alanis MD   Hosp Day # 0 PCP FRANK LOERA     Chief Complaint:   Chief Complaint   Patient presents with    Abdomen/Flank Pain    Nausea/Vomiting/Diarrhea       Subjective:    History of Present Illness:     Austin Mcfarland is a 34 year old male with a past medical history of anxiety and kidney stones.  He developed left flank pain and had repeat CT that showed left hydronephrosis.      History/Other:    Past Medical History:  Past Medical History[1]  Past Surgical History:   Past Surgical History[2]   Family History:   Family History[3]  Social History:    reports that he has never smoked. He has never used smokeless tobacco. He reports current alcohol use. He reports current drug use. Frequency: 7.00 times per week. Drug: Cannabis.     Allergies: Allergies[4]    Medications:  Medications Ordered Prior to Encounter[5]    Review of Systems:   A comprehensive review of systems was completed.    Pertinent positives and negatives noted in the HPI.    Objective:   Physical Exam:    /82 (BP Location: Left arm)   Pulse 59   Temp 97.8 °F (36.6 °C) (Oral)   Resp 17   Ht 6' 1.03\" (1.855 m)   Wt 170 lb (77.1 kg)   SpO2 100%   BMI 22.41 kg/m²   General: No acute distress, Alert  Respiratory: No rhonchi, no wheezes  Cardiovascular: S1, S2.   Abdomen: Soft, Non-tender, Non-distended, Positive bowel sounds  Neuro: No new focal deficits  Extremities: No edema    Results:    Labs:      Labs Last 24 Hours:  Recent Labs   Lab 25  0635   WBC 5.0   HGB 15.5   MCV 87.0   .0       Recent Labs   Lab 25  0635   *   BUN 12   CREATSERUM 1.07   CA 9.6   ALB 5.0*      K 4.0      CO2 30.0   ALKPHO 82   AST 16   ALT 13   BILT 0.7   TP 7.8       Estimated Glomerular Filtration Rate: 94 mL/min/1.73m2 (result from lab).    No  results for input(s): \"TROP\", \"TROPHS\", \"CK\" in the last 168 hours.    No results for input(s): \"PTP\", \"INR\" in the last 168 hours.    No results for input(s): \"TROP\", \"CK\" in the last 168 hours.      Imaging: Imaging data reviewed in Epic.    Assessment & Plan:      #Left hydronephrosis  Cysto and stent today  U/A neg    #Recurrent nephrolithiasis    All diagnosis' and recommendations discussed with patient and/or family in detail.      Plan of care discussed with ED physician      Huy Alanis MD    Supplementary Documentation:     The 21st Century Cures Act makes medical notes like these available to patients in the interest of transparency. Please be advised this is a medical document. Medical documents are intended to carry relevant information, facts as evident, and the clinical opinion of the practitioner. The medical note is intended as peer to peer communication and may appear blunt or direct. It is written in medical language and may contain abbreviations or verbiage that are unfamiliar.                                         [1]   Past Medical History:   Anxiety    Calculus of kidney    Congenital obstruction of ureteropelvic junction    Depression    Renal disorder    congenital left ureter stricture    [2]   Past Surgical History:  Procedure Laterality Date    Cystoscopy,+ureteroscopy Left 07/31/2010    s/p left rpg, left urs, left renoscopy, cysto, with stent placement 7-31-10 at [facility], Dr John Sheffield    Cystoscopy,+ureteroscopy Left 12/03/2010    L URS, Dr. Sheffield    Cystoscopy,+ureteroscopy Left     Cysto, URS, RPG, stent Bladder Stone removal-Dr Wilson    Cystoscopy,+ureteroscopy Left 03/19/2015    no stent placed, [facility] DIONY    Cystoscopy,insert ureteral stent  04/22/2014    Procedure: LITHOTRIPSY WITH CYSTOSCOPY, STENT PLACEMENT;  Surgeon: Dex Jane DO;  Location: [facility], [facility]    Cystoscopy,remv calculus,simple  12/27/2007    Performed by JOHN SHEFFIELD at [facility], [facility]     Cystoscopy,remv calculus,simple  05/23/2008    Performed by DEBORAH CALDERON at [facility], [facility]    Cystourethroscopy Left 12/27/2010    cysto stent removal- Dr. Calderon    Cystourethroscopy Left 12/19/2014    Cysto Stent Removal-Dr Jane    Cystourethroscopy,ureter catheter  03/10/2008    Performed by DEBORAH CALDERON at [facility], [facility]    Cystourethroscopy,ureter catheter  04/22/2014    Procedure: LITHOTRIPSY WITH CYSTOSCOPY, STENT PLACEMENT;  Surgeon: Dex Jane DO;  Location: [facility], [facility]    Fragmenting of kidney stone  09/25/2007    Performed by DEBORAH CALDERON at [facility], [facility]    Fragmenting of kidney stone  09/25/2007    Performed by DEBORAH CALDERON at [facility], [facility]    Fragmenting of kidney stone  04/22/2014    Procedure: LITHOTRIPSY WITH CYSTOSCOPY, STENT PLACEMENT;  Surgeon: Dex Jane DO;  Location: [facility]    Ir nephhrostomy tube  2007    Cysto stent removal, nephrostomy tube placement    Laparoscopy, surgical; pyeloplasty  10/11/2007    Left UPJ repair    Lithotripsy Left 06/21/2007    Left ESWL    Other surgical history  10/08/2016    Cysto, Lt RPG, Lt URS & Nephroscopy, Stone Manipulation, Stone Basket & Removal, Stent Placement-Dr. Calderon     Other surgical history      left pcnl cdh    Other surgical history      stent placement cdh    Other surgical history  12/05/2016    cysto stent removal dr calderon    Other surgical history  12/11/2020    Cysto/stent removal Dr. Su    Other surgical history  06/25/2021    CYSTOSCOPY, LEFT DIAGNOSTIC URETEROSCOPY, LEFT RETROGRADE PYELOGRAM, LEFT URETERAL STENT PLACEMENT,    Other surgical history  06/11/2021    CYSTOSCOPY,LEFT URETEROSCOPY,, RETROGRADE PYELOGRAM, LEFT STENT INSERTION    Other surgical history  01/21/2022    Cysto, Lt URS, RPHG, LL, Stone Extraction, Lt Stent Placement - Dr. Steel    Other surgical history  02/03/2022    Cysto Stent Removal- Dr. Steel    Renal  endoscopy  03/10/2008    Performed by DEBORAH KOHLI at [facility]    Special service or report  05/09/2007    Cysto, stone extraction    Special service or report  09/20/2006    Neph tube insertion with endopyelotomy    Special service or report  10/21/2007    Larger ureteral stent placed    Urology surgery procedure unlisted  05/23/2008    Performed by DEBORAH KOHLI at [facility]    X-ray antegrade pyelogram tube  03/10/2008    Performed by DEBORAH KOHLI at [facility]    X-ray retrograde pyelogram  03/10/2008    Performed by DEBORAH KOHLI at [facility]    X-ray retrograde pyelogram  05/23/2008    Performed by DEBORAH KOHLI at [facility]    X-ray retrograde pyelogram  04/22/2014    Procedure: LITHOTRIPSY WITH CYSTOSCOPY, STENT PLACEMENT;  Surgeon: Dex Jane DO;  Location: [facility]   [3] No family history on file.  [4] No Known Allergies  [5]   No current facility-administered medications on file prior to encounter.     Current Outpatient Medications on File Prior to Encounter   Medication Sig Dispense Refill    HYDROcodone-acetaminophen 5-325 MG Oral Tab Take 1-2 tablets by mouth every 4 (four) hours as needed for Pain. 6 tablet 0    phenazopyridine 100 MG Oral Tab Take 1 tablet (100 mg total) by mouth 3 (three) times daily as needed. 10 tablet 1    acetaminophen 500 MG Oral Tab Take 1-2 tablets (500-1,000 mg total) by mouth every 4 (four) hours as needed for Pain.      Naloxone HCl 4 MG/0.1ML Nasal Liquid 4 mg by Nasal route as needed. If patient remains unresponsive, repeat dose in other nostril 2-5 minutes after first dose. 1 kit 0

## 2025-06-23 NOTE — PROGRESS NOTES
Patient is saline locked, NPO for surgery tonight, on room air, voiding well, Morphine given for pain, ambulates independently, denies nausea. Possible discharge tonight vs tomorrow pending Urology clearance after surgery. Patient updated on plan of care.

## 2025-06-23 NOTE — ED INITIAL ASSESSMENT (HPI)
Pt to ED for c/o abdominal pain, left flank pain and N/V. Pt had left ureteral stent removal recently.

## 2025-06-23 NOTE — CONSULTS
[facility]  Department of Urology   Consultation Note    Austin Mcfarland Patient Status:  Emergency    1990 MRN [id number]   Location [facility] EMERGENCY DEPARTMENT Attending Roula Donato MD   Hosp Day # 0 PCP FRANK LOERA     Reason for Consultation:  Abdominal/flank pain  Left hydronephrosis    History of Present Illness:  Austin Mcfarland is a a(n) 34 year old male.     History of left UPJ obstruction, hydronephrosis, recurrent ureteral stricture disease  S/P robotic assisted lap left ureterolysis with buccal mucosal graft ureteroplasty, stent placement, 22.     Recurrent left urolithiasis  Patient underwent cystoscopy, left RGPG, left ureteral stent placement, left URS/laser lithotripsy 3/3/25 with Dr. Johnson  Findings: There was no dilation of ureter and the lumen appeared to be completely patent at the previous reconstruction site (with typical ballooning of the interposed graft); the kidney calyces were observed to be very dilated and hydronephrotic with a small lower pole filling defect, most likely signifying the stone.    Stent removed    Admitted to [facility] in 2025 with left flank pain, left hydronephrosis  S/P cystoscopy, left RGPG, left ureteroscopy, left ureteral stent placement, renal washout study 3/27/25 with Dr. Toth  Findings: Dr. Toth thinks this is a stand pipe effect and thus from RPF     S/P laparoscopic robotic-assisted left ureterolysis, omental flap of ureter 25 with Dr. Johnson.    Seen in urology office on 25 - found to have stent encrustation and only a part of the stent was brought down into the bladder and proximal urethra.  There was significant resistance and procedure was aborted.  Sent to ER for further treatment.  S/P cystoscopy, left ureteral stent removal, left retrograde ureteropyelogram 25 with Dr. Johnson.    FINDINGS: encrusted left ureter stent.  No extravasation of contrast, no  dilation of ureter, and no filling defects were observed within the ureter. However, the left kidney calyces were observed to be dilated and contrast pooled within the collecting system (this has been previously observed as a result of long-standing / chronic hydronephrosis).       Patient presented with abdominal pain/left flank pain.  He reports the onset of pain at 3 AM today.  +nausea/vomiting.  No fever or chills.  +dysuria.  Reports some intermittency.      Labs:  WBC 5  Hgb 15.5  Serum creat 1.07    UA 6/23/25: negative    Abdominal/pelvic CT scan with contrast 6/23/25:  CONCLUSION:    1. There is moderate to severe left-sided hydronephrosis with significant mucosal thickening involving the inferior left renal pelvis and proximal to mid left ureter either representing infectious etiologies, inflammatory changes or post treatment   changes.  Overall decrease in the left-sided nephrolithiasis.  There is mild to moderate bladder wall thickening with a single air bubble which may be related to recent instrumentation with other etiologies not excluded.       History:  Past Medical History[1]  Past Surgical History[2]  Family History[3]   reports that he has never smoked. He has never used smokeless tobacco. He reports current alcohol use. He reports current drug use. Frequency: 7.00 times per week. Drug: Cannabis.    Allergies:  Allergies[4]    Medications:  Current Hospital Medications[5]    Review of Systems:  Pertinent items are noted in HPI.  10 point review of systems completed.    Physical Exam:  /82 (BP Location: Left arm)   Pulse 59   Temp 97.8 °F (36.6 °C) (Oral)   Resp 17   Ht 6' 1.03\" (1.855 m)   Wt 170 lb (77.1 kg)   SpO2 100%   BMI 22.41 kg/m²   GENERAL: the patient is sitting up in chair in no acute distress.    HEENT: Unremarkable.  NECK: Supple.    LUNGS: non-labored respirations.    ABDOMEN: The abdomen is soft and nontender without rebound or guarding.  Previous incisions healing  well.    BACK: left CVA tenderness.    SKIN: Without stigmata.   NEUROLOGIC: Grossly intact.  EXTREMITIES: Without edema.      Laboratory Data:  Lab Results   Component Value Date    WBC 5.0 06/23/2025    HGB 15.5 06/23/2025    HCT 43.6 06/23/2025    .0 06/23/2025    CREATSERUM 1.07 06/23/2025    BUN 12 06/23/2025     06/23/2025    K 4.0 06/23/2025     06/23/2025    CO2 30.0 06/23/2025     06/23/2025    CA 9.6 06/23/2025    ALB 5.0 06/23/2025    ALKPHO 82 06/23/2025    BILT 0.7 06/23/2025    TP 7.8 06/23/2025    AST 16 06/23/2025    ALT 13 06/23/2025    LIP 42 06/23/2025     UA 6/23/25: negative     Imaging:  CT ABDOMEN+PELVIS(CONTRAST ONLY)(CPT=74177)  Result Date: 6/23/2025  CONCLUSION:  1. There is moderate to severe left-sided hydronephrosis with significant mucosal thickening involving the inferior left renal pelvis and proximal to mid left ureter either representing infectious etiologies, inflammatory changes or post treatment changes.  Overall decrease in the left-sided nephrolithiasis.  There is mild to moderate bladder wall thickening with a single air bubble which may be related to recent instrumentation with other etiologies not excluded.    LOCATION:  [id number] [id number]   Dictated by (CST): Richard Lynch MD on 6/23/2025 at 7:56 AM     Finalized by (CST): Richard Lynch MD on 6/23/2025 at 8:07 AM       XR CHEST AP PORTABLE  (CPT=71045)  Result Date: 6/23/2025  CONCLUSION:  Normal examination.    LOCATION:  [id number]      Dictated by (CST): Richard Lynch MD on 6/23/2025 at 7:08 AM     Finalized by (CST): Richard Lynch MD on 6/23/2025 at 7:09 AM        Impression:  Problem List[6]    LEFT HYDRONEPHROSIS, RPF, LEFT FLANK PAIN  S/P laparoscopic robotic-assisted left ureterolysis, omental flap of ureter 5/23/25 with Dr. Miocinovic    ENCRUSTED LEFT URETERAL STENT, RETAINED URETERAL STENT  S/P Cystoscopy, Left ureter stent removal, Left retrograde ureteropyelogram 6/16/25 with  Dr. Johnson    ADMITTED WITH ABDOMINAL PAIN/LEFT FLANK PAIN, LEFT HYDRONEPHROSIS  Afebrile  No leukocytosis  Serum creat 1.07  UA does not appear infectious    Recommendations:  Recommend cystoscopy, left retrograde pyelogram, and insertion of a left ureteral stent. Reviewed risks, benefits, alternatives, and complications of the procedure including but not limited to risk of anesthesia, bladder/ureteral injury, use of nephrostomy tube, bleeding, infection, and ureteral stent related symptoms with the patient who understands and wishes to proceed.  Patient informed the ureteral stent is not a permament implant and would eventually need to be removed or exchanged pending patient's clinical course.  Patient agrees and understands.      NPO  Consent to be signed  Ancef on call to OR    In the interim, continue with pain management, supportive care    Check PVR bladder scan.      Above discussed with patient, nurse, Dr. Toth.   Will update Dr. Johnson.      Thank you for allowing me to participate in the care of your patient.    Monse Caldwell PA-C  [facility]  Department of Urology  6/23/2025  10:48 AM         [1]   Past Medical History:   Anxiety    Calculus of kidney    Congenital obstruction of ureteropelvic junction    Depression    Renal disorder    congenital left ureter stricture    [2]   Past Surgical History:  Procedure Laterality Date    Cystoscopy,+ureteroscopy Left 07/31/2010    s/p left rpg, left urs, left renoscopy, cysto, with stent placement 7-31-10 at [facility], Dr John Sheffield    Cystoscopy,+ureteroscopy Left 12/03/2010    L URS, Dr. Sheffield    Cystoscopy,+ureteroscopy Left     Cysto, URS, RPG, stent Bladder Stone removal-Dr Wilson    Cystoscopy,+ureteroscopy Left 03/19/2015    no stent placed, [facility] DIONY    Cystoscopy,insert ureteral stent  04/22/2014    Procedure: LITHOTRIPSY WITH CYSTOSCOPY, STENT PLACEMENT;  Surgeon: Dex Jane DO;  Location: [facility]    Cystoscopy,[facility]  calculus,simple  12/27/2007    Performed by DEBORAH CALDERON at [facility], [facility]    Cystoscopy,remv calculus,simple  05/23/2008    Performed by DEBORAH CALDERON at [facility], [facility]    Cystourethroscopy Left 12/27/2010    cysto stent removal- Dr. Calderon    Cystourethroscopy Left 12/19/2014    Cysto Stent Removal-Dr Jane    Cystourethroscopy,ureter catheter  03/10/2008    Performed by DEBORAH CALDERON at [facility], [facility]    Cystourethroscopy,ureter catheter  04/22/2014    Procedure: LITHOTRIPSY WITH CYSTOSCOPY, STENT PLACEMENT;  Surgeon: Dex Jane DO;  Location: [facility], [facility]    Fragmenting of kidney stone  09/25/2007    Performed by DEBORAH CALDERON at [facility], [facility]    Fragmenting of kidney stone  09/25/2007    Performed by DEBORAH CALDERON at [facility], [facility]    Fragmenting of kidney stone  04/22/2014    Procedure: LITHOTRIPSY WITH CYSTOSCOPY, STENT PLACEMENT;  Surgeon: Dex Jane DO;  Location: [facility], [facility]    Ir nephhrostomy tube  2007    Cysto stent removal, nephrostomy tube placement    Laparoscopy, surgical; pyeloplasty  10/11/2007    Left UPJ repair    Lithotripsy Left 06/21/2007    Left ESWL    Other surgical history  10/08/2016    Cysto, Lt RPG, Lt URS & Nephroscopy, Stone Manipulation, Stone Basket & Removal, Stent Placement-Dr. Calderon     Other surgical history      left pcnl cdh    Other surgical history      stent placement cdh    Other surgical history  12/05/2016    cysto stent removal dr calderon    Other surgical history  12/11/2020    Cysto/stent removal Dr. Su    Other surgical history  06/25/2021    CYSTOSCOPY, LEFT DIAGNOSTIC URETEROSCOPY, LEFT RETROGRADE PYELOGRAM, LEFT URETERAL STENT PLACEMENT,    Other surgical history  06/11/2021    CYSTOSCOPY,LEFT URETEROSCOPY,, RETROGRADE PYELOGRAM, LEFT STENT INSERTION    Other surgical history  01/21/2022    Cysto, Lt URS, RPHG, LL, Stone Extraction, Lt Stent Placement -   [place]    Other surgical history  02/03/2022    Cysto Stent Removal- Dr. Steel    Renal endoscopy  03/10/2008    Performed by DEBORAH KOHLI at [facility]    Special service or report  05/09/2007    Cysto, stone extraction    Special service or report  09/20/2006    Neph tube insertion with endopyelotomy    Special service or report  10/21/2007    Larger ureteral stent placed    Urology surgery procedure unlisted  05/23/2008    Performed by DEBORAH KOHLI at [facility]    X-ray antegrade pyelogram tube  03/10/2008    Performed by DEBORAH KOHLI at [facility]    X-ray retrograde pyelogram  03/10/2008    Performed by DEBORAH KOHLI at [facility]    X-ray retrograde pyelogram  05/23/2008    Performed by DEBORAH KOHLI at [facility]    X-ray retrograde pyelogram  04/22/2014    Procedure: LITHOTRIPSY WITH CYSTOSCOPY, STENT PLACEMENT;  Surgeon: Dex Jane DO;  Location: [facility]   [3] No family history on file.  [4] No Known Allergies  [5]   Current Facility-Administered Medications:     HYDROmorphone (Dilaudid) 1 MG/ML injection 1 mg, 1 mg, Intravenous, Once  [6]   Patient Active Problem List  Diagnosis    Ureteropelvic junction obstruct, congenital    Recurrent kidney stones    Hydronephrosis of left kidney    Flank pain    Hydronephrosis, unspecified hydronephrosis type    Retroperitoneal fibrosis    Ureteral stent retained

## 2025-06-23 NOTE — PROGRESS NOTES
NURSING ADMISSION NOTE      Patient admitted via Cart  Oriented to room.  Safety precautions initiated.  Bed in low position.  Call light in reach.    Patient arrived to the floor via cart from ER in stable condition at 1125.     1132: Paged  for admission orders. Orders placed.     ** 2 person skin check completed with MEGAN Roldan. No wounds or redness noted.     1738: Patient transported to surgery in stable condition. Consent signed, pre-op checklist completed, pre-op antibiotic sent down with chart.

## 2025-06-23 NOTE — ED PROVIDER NOTES
Patient Seen in: [facility] Emergency Department        History  Chief Complaint   Patient presents with    Abdomen/Flank Pain    Nausea/Vomiting/Diarrhea     Stated Complaint: Hx of kidney with abdominal pain and vomiting since 0330 am    Subjective:   HPI            Patient is a 34-year-old male with history of kidney stones, ureteral stricture who had a left stent removal about 1 week ago presenting with acute onset of periumbilical pain.  Symptoms started around 3 AM and has had vomiting since.  He is had no fevers or chills.  He notes he has had some burning with urination but was told by his urologist this would be the case after his stent removal.      Objective:     Past Medical History:    Anxiety    Calculus of kidney    Congenital obstruction of ureteropelvic junction    Depression    Renal disorder    congenital left ureter stricture               Past Surgical History:   Procedure Laterality Date    Cystoscopy,+ureteroscopy Left 07/31/2010    s/p left rpg, left urs, left renoscopy, cysto, with stent placement 7-31-10 at [facility], Dr John Sheffield    Cystoscopy,+ureteroscopy Left 12/03/2010    L URS, Dr. Sheffield    Cystoscopy,+ureteroscopy Left     Cysto, URS, RPG, stent Bladder Stone removal-Dr Wilson    Cystoscopy,+ureteroscopy Left 03/19/2015    no stent placed, [facility] DIONY    Cystoscopy,insert ureteral stent  04/22/2014    Procedure: LITHOTRIPSY WITH CYSTOSCOPY, STENT PLACEMENT;  Surgeon: Dex Jane DO;  Location: [facility]    Cystoscopy,remv calculus,simple  12/27/2007    Performed by JOHN SHEFFIELD at [facility]    Cystoscopy,remv calculus,simple  05/23/2008    Performed by JOHN SHEFFIELD at [facility]    Cystourethroscopy Left 12/27/2010    cysto stent removal- Dr. Sheffield    Cystourethroscopy Left 12/19/2014    Cysto Stent Removal-Dr Jane    Cystourethroscopy,ureter catheter  03/10/2008    Performed by JOHN SHEFFIELD at [facility]     Cystourethroscopy,ureter catheter  04/22/2014    Procedure: LITHOTRIPSY WITH CYSTOSCOPY, STENT PLACEMENT;  Surgeon: Dex Jane DO;  Location: [facility]    Fragmenting of kidney stone  09/25/2007    Performed by DEBORAH CALDERON at [facility]    Fragmenting of kidney stone  09/25/2007    Performed by DEBORAH CALDERON at [facility], [facility]    Fragmenting of kidney stone  04/22/2014    Procedure: LITHOTRIPSY WITH CYSTOSCOPY, STENT PLACEMENT;  Surgeon: Dex Jane DO;  Location: [facility]    Ir nephhrostomy tube  2007    Cysto stent removal, nephrostomy tube placement    Laparoscopy, surgical; pyeloplasty  10/11/2007    Left UPJ repair    Lithotripsy Left 06/21/2007    Left ESWL    Other surgical history  10/08/2016    Cysto, Lt RPG, Lt URS & Nephroscopy, Stone Manipulation, Stone Basket & Removal, Stent Placement-Dr. Calderon     Other surgical history      left pcnl cdh    Other surgical history      stent placement cdh    Other surgical history  12/05/2016    cysto stent removal dr calderon    Other surgical history  12/11/2020    Cysto/stent removal Dr. Su    Other surgical history  06/25/2021    CYSTOSCOPY, LEFT DIAGNOSTIC URETEROSCOPY, LEFT RETROGRADE PYELOGRAM, LEFT URETERAL STENT PLACEMENT,    Other surgical history  06/11/2021    CYSTOSCOPY,LEFT URETEROSCOPY,, RETROGRADE PYELOGRAM, LEFT STENT INSERTION    Other surgical history  01/21/2022    Cysto, Lt URS, RPHG, LL, Stone Extraction, Lt Stent Placement - Dr. Steel    Other surgical history  02/03/2022    Cysto Stent Removal- Dr. Steel    Renal endoscopy  03/10/2008    Performed by DEBORAH CALDERON at [facility]    Special service or report  05/09/2007    Cysto, stone extraction    Special service or report  09/20/2006    Neph tube insertion with endopyelotomy    Special service or report  10/21/2007    Larger ureteral stent placed    Urology surgery procedure unlisted  05/23/2008     Performed by DEBORAH KOHLI at [facility]    X-ray antegrade pyelogram tube  03/10/2008    Performed by DEBORAH KOHLI at [facility]    X-ray retrograde pyelogram  03/10/2008    Performed by DEBORAH KOHLI at [facility]    X-ray retrograde pyelogram  05/23/2008    Performed by DEBORAH KOHLI at [facility]    X-ray retrograde pyelogram  04/22/2014    Procedure: LITHOTRIPSY WITH CYSTOSCOPY, STENT PLACEMENT;  Surgeon: Dex Jane DO;  Location: [facility]                Social History     Socioeconomic History    Marital status: Single   Tobacco Use    Smoking status: Never    Smokeless tobacco: Never   Vaping Use    Vaping status: Never Used   Substance and Sexual Activity    Alcohol use: Yes     Comment: ocassionally    Drug use: Yes     Frequency: 7.0 times per week     Types: Cannabis     Comment: medical     Social Drivers of Health     Food Insecurity: No Food Insecurity (5/23/2025)    NCSS - Food Insecurity     Worried About Running Out of Food in the Last Year: No     Ran Out of Food in the Last Year: No   Transportation Needs: No Transportation Needs (5/23/2025)    NCSS - Transportation     Lack of Transportation: No   Housing Stability: Not At Risk (5/23/2025)    NCSS - Housing/Utilities     Has Housing: Yes     Worried About Losing Housing: No     Unable to Get Utilities: No                                Physical Exam    ED Triage Vitals [06/23/25 0631]   /77   Pulse 88   Resp 20   Temp 97.4 °F (36.3 °C)   Temp src Temporal   SpO2 100 %   O2 Device None (Room air)       Current Vitals:   Vital Signs  BP: 114/81  Pulse: 75  Resp: 17  Temp: 97.4 °F (36.3 °C)  Temp src: Temporal  MAP (mmHg): (!) 104    Oxygen Therapy  SpO2: 98 %  O2 Device: None (Room air)            Physical Exam  Vitals and nursing note reviewed.   Constitutional:       General: He is not in acute distress.     Appearance: Normal appearance.   HENT:       Head: Normocephalic.      Nose: Nose normal.      Mouth/Throat:      Mouth: Mucous membranes are moist.   Eyes:      Extraocular Movements: Extraocular movements intact.      Pupils: Pupils are equal, round, and reactive to light.   Cardiovascular:      Rate and Rhythm: Normal rate and regular rhythm.      Pulses: Normal pulses.   Pulmonary:      Effort: Pulmonary effort is normal.   Abdominal:      General: Abdomen is flat. Bowel sounds are normal. There is no distension.      Palpations: Abdomen is soft.      Tenderness: There is generalized abdominal tenderness. There is no right CVA tenderness, left CVA tenderness, guarding or rebound.      Hernia: No hernia is present.   Musculoskeletal:         General: No swelling or tenderness. Normal range of motion.      Cervical back: Normal range of motion.   Skin:     General: Skin is warm and dry.   Neurological:      Mental Status: He is alert and oriented to person, place, and time. Mental status is at baseline.   Psychiatric:         Mood and Affect: Mood normal.                 ED Course  Labs Reviewed   COMP METABOLIC PANEL (14) - Abnormal; Notable for the following components:       Result Value    Glucose 109 (*)     Albumin 5.0 (*)     All other components within normal limits   LIPASE - Normal   CBC WITH DIFFERENTIAL WITH PLATELET   URINALYSIS WITH CULTURE REFLEX   RAINBOW DRAW BLUE                            [facility]         Admission disposition: 6/23/2025  9:03 AM           Medical Decision Making  The differential includes the following  Bowel perforation, appendicitis, recurrent renal stone, constpiation     Pertinent comorbidities include  As detailed above     Pertinent social history includes  As detailed above     Labs  Nl wbc, nl renal function, UA normal     Imaging studies  I reviewed the images and my independent interpreation after review is constipation. Additionaly, I reviewd the radiology report that states the following CT ABDOMEN+PELVIS(CONTRAST  ONLY)(CPT=74177)  Result Date: 6/23/2025  CONCLUSION:  1. There is moderate to severe left-sided hydronephrosis with significant mucosal thickening involving the inferior left renal pelvis and proximal to mid left ureter either representing infectious etiologies, inflammatory changes or post treatment changes.  Overall decrease in the left-sided nephrolithiasis.  There is mild to moderate bladder wall thickening with a single air bubble which may be related to recent instrumentation with other etiologies not excluded.    LOCATION:  [id number]   Dictated by (CST): Richard Lynch MD on 6/23/2025 at 7:56 AM     Finalized by (CST): Richard Lynch MD on 6/23/2025 at 8:07 AM       XR CHEST AP PORTABLE  (CPT=71045)  Result Date: 6/23/2025  CONCLUSION:  Normal examination.    LOCATION:  [id number]      Dictated by (CST): Richard Lynch MD on 6/23/2025 at 7:08 AM     Finalized by (CST): Richard Lynch MD on 6/23/2025 at 7:09 AM           External data reviewed  Prior urology note     Discussion of management with external providers  Message sent to [facility] urology via [employer]     ER course  Patient's initial vitals afebrile, HDS,.  Surgical incisions clean dry intact.  Patient has some diffuse tenderness in the lower quadrants and some left CVA tenderness.  He has been given Dilaudid and Zofran along with fluids followed by Toradol after CT scan.  Chest x-ray unremarkable.  CT with continued left hydro.  Will discuss with urology.    9:03 AM   Case discussed with Dr. Toth who recommends admission and plan for OR for replacement of stent.  Patient endorsed to the [facility].    Disposition and Plan     Clinical Impression:  1. Hydronephrosis of left kidney    2. Left flank pain         Disposition:  Admit  6/23/2025  9:03 am    Follow-up:  No follow-up provider specified.        Medications Prescribed:  Current Discharge Medication List                Supplementary Documentation:         Hospital  Problems       Present on Admission  Date Reviewed: 5/15/2025          ICD-10-CM Noted POA    * (Principal) Hydronephrosis of left kidney N13.30 5/27/2022 Unknown

## 2025-06-23 NOTE — ANESTHESIA POSTPROCEDURE EVALUATION
[facility]    Austin Mcfarland Patient Status:  Observation   Age/Gender 34 year old male MRN [id number]   Location [facility] SURGERY Attending Huy Alanis MD   Hosp Day # 0 PCP FRANK LOERA       Anesthesia Post-op Note    CYSTOSCOPY, LEFT STENT INSERTION, LEFT RETROGRADE PYELOGRAM    Procedure Summary       Date: 06/23/25 Room / Location:  MAIN OR 07 /  MAIN OR    Anesthesia Start: 1821 Anesthesia Stop: 1858    Procedure: CYSTOSCOPY, LEFT STENT INSERTION, LEFT RETROGRADE PYELOGRAM (Left: Ureter) Diagnosis:       Ureteral calculi      (Ureteral calculi [N20.1])    Surgeons: Quirino Toth MD Anesthesiologist: Rajiv Lara MD    Anesthesia Type: general ASA Status: Not recorded            Anesthesia Type: general    Vitals Value Taken Time   /58 06/23/25 18:58   Temp 97.8 °F (36.6 °C) 06/23/25 18:58   Pulse 66 06/23/25 18:58   Resp 13 06/23/25 18:58   SpO2 96 % 06/23/25 18:58           Patient Location: PACU    Anesthesia Type: general    Airway Patency: patent    Postop Pain Control: adequate    Mental Status: mildly sedated but able to meaningfully participate in the post-anesthesia evaluation    Nausea/Vomiting: none    Cardiopulmonary/Hydration status: stable euvolemic    Complications: no apparent anesthesia related complications    Postop vital signs: stable    Dental Exam: Unchanged from Preop    Patient to be discharged from PACU when criteria met.

## 2025-06-23 NOTE — ED QUICK NOTES
N2N report received from VILMA Tomlinson. Patient is awaiting admission at this time. Plan of care reviewed. Pain management till transport to unit. Last Dilaudid just given.

## 2025-06-23 NOTE — CONSULTS
All risks, benefits and alternatives to surgery were discusssed in detail with the patient. Complications such as urosepsis, bleeding, infection, hematuria, urinary retention, clot retention, and the rare risk of urethral, bladder and/ or ureteral injury were detailed to the patient. The patient was given ample time to ask questions. All questions were answered.  After weighing the risks, benefits and alternatives, patient elects to proceed with surgery as planned.

## 2025-06-23 NOTE — ED QUICK NOTES
Rounding completed.    Plan of care reviewed with patient and/or family.  Patient's vitals and pain updated; as per documentation.     Patient awaiting admission to unit.     Patient requesting nothing at this time; pain mild/10. Introduction, updated, and assessment provided. Call light within reach.

## 2025-06-23 NOTE — ED QUICK NOTES
Orders for admission, patient is aware of plan and ready to go upstairs. Any questions, please call ED RN  at extension [phone].     Patient Covid vaccination status: Fully vaccinated     COVID Test Ordered in ED: None    COVID Suspicion at Admission: N/A    Running Infusions: Medication Infusions[1] None    Mental Status/LOC at time of transport: AXO4    Other pertinent information: pain managed w/toradol and dilaudid; significant kidney urinary hx    CIWA score: N/A   NIH score:  N/A               [1]

## 2025-06-24 NOTE — DISCHARGE INSTRUCTIONS
Keflex  500 mg  3  x a day  x  5 days     As needed Ditropan     Will plan on getting a urodynamics study of the bladder -- Ana  will call you to schedule as an out patient   The bladder looked all fibrotic and trabeculated     Before we do any  big  surgery on the ureter again - We need to better evaluate the bladder     The ureter looked exactly the same and before the last ureterolysis surgery. Unchanged

## 2025-06-24 NOTE — OPERATIVE REPORT
[facility]   part of [facility]    Operative Note         Austin Mcfarland Location: OR   [facility] [id number] MRN [id number]   Admission Date 6/23/2025 Operation Date 6/23/2025   Attending Physician Huy Alanis MD       Patient Name: Austin Mcfarland     Preoperative Diagnosis:    LEFT Retroperitoneal fibrosis     LEFT  Hydronephrosis     Postoperative Diagnosis:      LEFT Retroperitoneal fibrosis     LEFT  Hydronephrosis     Symptoms of Dysfunctional Voiding     Procedure(s):  CYSTOSCOPY, LEFT STENT INSERTION, LEFT RETROGRADE PYELOGRAM   EXAMINATION UNDER ANESTHESIA  RECTAL     Primary Surgeon: Quirino Toth MD           Anesthesia: General     Specimen: * No specimens in log *     Estimated Blood Loss: No data recorded     Complications: none      Indications for procedure: Pain      Surgical Findings: Moderate to severe trabeculations  Pipe stem like ureter - unchanged from prior imaging      Complexity: LOW   (optional)    Operative Summary:      The patient was well identified in the operating room table and then prepped and draped in the usual sterile fashion in the lithotomy position.  All pressure points were padded.  She underwent a standard timeout procedure and received IV  antibiotics and Venodyne's within 1 hour of our surgery.  We initially performed rigid cystoscopy which noted a moderate to severe trabeculated bladder with  no evidence of tumor or stone in the bladder. The mucosa  was very friable  We identified the LEFT  ureteral orifice and performed a LEFT  retrograde urogram to confirm the anatomy and renal pelvis and calyces. The ureter  looked like a pipe - narrow throughout  and very rigid and fixed in position. The retrograde showed a bifid collecting system and the contrast  did not drain well beyond the UPJ. The retrograde looked just like the prior RPG  from prior to ureterolysis.  Because the bladder ws all trabeculated and the prostate wide open - we did  a SAIRA that  showed a small and smooth prostate. I suspect he is a dysfunctional voider. Would like to get a URODYNAMICS study as an out patient .   We then placed a sensor guidewire under fluoroscopy.  Over the  guidewire we placed a 6 x 28   double-J stent ,  with good curls in the kidney and bladder noted.  .  The patient was then  subsequently transferred to the recovery room in stable condition.          Implants:   Implant Name Type Inv. Item Serial No.  Lot No. LRB No. Used Action   STENT URET [id number] ASCERTA - SNA  STENT URET [id number] ASCERTA NA [employer]  [id number] Left 1 Implanted        Drains: 6  x 28 JJ stent      Condition: Stable        Quirino Toth MD

## 2025-06-25 ENCOUNTER — APPOINTMENT (OUTPATIENT)
Dept: CT IMAGING | Facility: HOSPITAL | Age: 35
End: 2025-06-25
Attending: EMERGENCY MEDICINE
Payer: COMMERCIAL

## 2025-06-25 ENCOUNTER — HOSPITAL ENCOUNTER (OUTPATIENT)
Facility: HOSPITAL | Age: 35
Setting detail: OBSERVATION
Discharge: HOME | End: 2025-06-27
Attending: EMERGENCY MEDICINE | Admitting: EMERGENCY MEDICINE
Payer: COMMERCIAL

## 2025-06-25 DIAGNOSIS — N23 URETERAL COLIC: Primary | ICD-10-CM

## 2025-06-25 LAB
ALBUMIN SERPL-MCNC: 5 G/DL (ref 3.2–4.8)
ALBUMIN/GLOB SERPL: 1.9 {RATIO} (ref 1–2)
ALP LIVER SERPL-CCNC: 79 U/L (ref 45–117)
ALT SERPL-CCNC: 10 U/L (ref 10–49)
ANION GAP SERPL CALC-SCNC: 7 MMOL/L (ref 0–18)
AST SERPL-CCNC: 14 U/L (ref ?–34)
BASOPHILS # BLD AUTO: 0.03 X10(3) UL (ref 0–0.2)
BASOPHILS NFR BLD AUTO: 0.5 %
BILIRUB SERPL-MCNC: 0.9 MG/DL (ref 0.3–1.2)
BILIRUB UR QL STRIP.AUTO: NEGATIVE
BUN BLD-MCNC: 13 MG/DL (ref 9–23)
CALCIUM BLD-MCNC: 9.5 MG/DL (ref 8.7–10.6)
CHLORIDE SERPL-SCNC: 103 MMOL/L (ref 98–112)
CLARITY UR REFRACT.AUTO: CLEAR
CO2 SERPL-SCNC: 30 MMOL/L (ref 21–32)
COLOR UR AUTO: COLORLESS
CREAT BLD-MCNC: 1.04 MG/DL (ref 0.7–1.3)
EGFRCR SERPLBLD CKD-EPI 2021: 97 ML/MIN/1.73M2 (ref 60–?)
EOSINOPHIL # BLD AUTO: 0.08 X10(3) UL (ref 0–0.7)
EOSINOPHIL NFR BLD AUTO: 1.4 %
ERYTHROCYTE [DISTWIDTH] IN BLOOD BY AUTOMATED COUNT: 11.9 %
GLOBULIN PLAS-MCNC: 2.7 G/DL (ref 2–3.5)
GLUCOSE BLD-MCNC: 95 MG/DL (ref 70–99)
GLUCOSE UR STRIP.AUTO-MCNC: NORMAL MG/DL
HCT VFR BLD AUTO: 42.1 % (ref 39–53)
HGB BLD-MCNC: 15.2 G/DL (ref 13–17.5)
IMM GRANULOCYTES # BLD AUTO: 0.01 X10(3) UL (ref 0–1)
IMM GRANULOCYTES NFR BLD: 0.2 %
KETONES UR STRIP.AUTO-MCNC: NEGATIVE MG/DL
LEUKOCYTE ESTERASE UR QL STRIP.AUTO: 75
LIPASE SERPL-CCNC: 26 U/L (ref 12–53)
LYMPHOCYTES # BLD AUTO: 1.87 X10(3) UL (ref 1–4)
LYMPHOCYTES NFR BLD AUTO: 33.9 %
MCH RBC QN AUTO: 31.1 PG (ref 26–34)
MCHC RBC AUTO-ENTMCNC: 36.1 G/DL (ref 31–37)
MCV RBC AUTO: 86.1 FL (ref 80–100)
MONOCYTES # BLD AUTO: 0.44 X10(3) UL (ref 0.1–1)
MONOCYTES NFR BLD AUTO: 8 %
NEUTROPHILS # BLD AUTO: 3.09 X10 (3) UL (ref 1.5–7.7)
NEUTROPHILS # BLD AUTO: 3.09 X10(3) UL (ref 1.5–7.7)
NEUTROPHILS NFR BLD AUTO: 56 %
NITRITE UR QL STRIP.AUTO: NEGATIVE
OSMOLALITY SERPL CALC.SUM OF ELEC: 290 MOSM/KG (ref 275–295)
PH UR STRIP.AUTO: 6.5 [PH] (ref 5–8)
PLATELET # BLD AUTO: 199 10(3)UL (ref 150–450)
POTASSIUM SERPL-SCNC: 3.9 MMOL/L (ref 3.5–5.1)
PROT SERPL-MCNC: 7.7 G/DL (ref 5.7–8.2)
PROT UR STRIP.AUTO-MCNC: 30 MG/DL
RBC # BLD AUTO: 4.89 X10(6)UL (ref 4.3–5.7)
RBC #/AREA URNS AUTO: >10 /HPF
SODIUM SERPL-SCNC: 140 MMOL/L (ref 136–145)
SP GR UR STRIP.AUTO: 1.01 (ref 1–1.03)
UROBILINOGEN UR STRIP.AUTO-MCNC: NORMAL MG/DL
WBC # BLD AUTO: 5.5 X10(3) UL (ref 4–11)

## 2025-06-25 PROCEDURE — 74176 CT ABD & PELVIS W/O CONTRAST: CPT | Performed by: EMERGENCY MEDICINE

## 2025-06-25 PROCEDURE — 99223 1ST HOSP IP/OBS HIGH 75: CPT | Performed by: HOSPITALIST

## 2025-06-25 RX ORDER — KETOROLAC TROMETHAMINE 15 MG/ML
15 INJECTION, SOLUTION INTRAMUSCULAR; INTRAVENOUS EVERY 6 HOURS PRN
Status: DISCONTINUED | OUTPATIENT
Start: 2025-06-25 | End: 2025-06-27

## 2025-06-25 RX ORDER — MORPHINE SULFATE 4 MG/ML
1 INJECTION, SOLUTION INTRAMUSCULAR; INTRAVENOUS EVERY 2 HOUR PRN
Status: DISCONTINUED | OUTPATIENT
Start: 2025-06-25 | End: 2025-06-27

## 2025-06-25 RX ORDER — ECHINACEA PURPUREA EXTRACT 125 MG
1 TABLET ORAL
Status: DISCONTINUED | OUTPATIENT
Start: 2025-06-25 | End: 2025-06-27

## 2025-06-25 RX ORDER — CEPHALEXIN 500 MG/1
500 CAPSULE ORAL 3 TIMES DAILY
Status: CANCELLED | OUTPATIENT
Start: 2025-06-25

## 2025-06-25 RX ORDER — PROCHLORPERAZINE EDISYLATE 5 MG/ML
5 INJECTION INTRAMUSCULAR; INTRAVENOUS EVERY 8 HOURS PRN
Status: DISCONTINUED | OUTPATIENT
Start: 2025-06-25 | End: 2025-06-27

## 2025-06-25 RX ORDER — ACETAMINOPHEN 500 MG
500 TABLET ORAL EVERY 4 HOURS PRN
Status: DISCONTINUED | OUTPATIENT
Start: 2025-06-25 | End: 2025-06-27

## 2025-06-25 RX ORDER — SODIUM CHLORIDE, SODIUM LACTATE, POTASSIUM CHLORIDE, CALCIUM CHLORIDE 600; 310; 30; 20 MG/100ML; MG/100ML; MG/100ML; MG/100ML
INJECTION, SOLUTION INTRAVENOUS CONTINUOUS
Status: DISCONTINUED | OUTPATIENT
Start: 2025-06-25 | End: 2025-06-27

## 2025-06-25 RX ORDER — KETOROLAC TROMETHAMINE 30 MG/ML
30 INJECTION, SOLUTION INTRAMUSCULAR; INTRAVENOUS ONCE
Status: COMPLETED | OUTPATIENT
Start: 2025-06-25 | End: 2025-06-25

## 2025-06-25 RX ORDER — BISACODYL 10 MG
10 SUPPOSITORY, RECTAL RECTAL
Status: DISCONTINUED | OUTPATIENT
Start: 2025-06-25 | End: 2025-06-27

## 2025-06-25 RX ORDER — MORPHINE SULFATE 4 MG/ML
0.5 INJECTION, SOLUTION INTRAMUSCULAR; INTRAVENOUS EVERY 2 HOUR PRN
Status: DISCONTINUED | OUTPATIENT
Start: 2025-06-25 | End: 2025-06-27

## 2025-06-25 RX ORDER — ONDANSETRON 2 MG/ML
4 INJECTION INTRAMUSCULAR; INTRAVENOUS EVERY 6 HOURS PRN
Status: DISCONTINUED | OUTPATIENT
Start: 2025-06-25 | End: 2025-06-27

## 2025-06-25 RX ORDER — SENNOSIDES 8.6 MG
17.2 TABLET ORAL NIGHTLY PRN
Status: DISCONTINUED | OUTPATIENT
Start: 2025-06-25 | End: 2025-06-27

## 2025-06-25 RX ORDER — MORPHINE SULFATE 4 MG/ML
4 INJECTION, SOLUTION INTRAMUSCULAR; INTRAVENOUS EVERY 30 MIN PRN
Status: DISCONTINUED | OUTPATIENT
Start: 2025-06-25 | End: 2025-06-25

## 2025-06-25 RX ORDER — ONDANSETRON 2 MG/ML
4 INJECTION INTRAMUSCULAR; INTRAVENOUS ONCE
Status: COMPLETED | OUTPATIENT
Start: 2025-06-25 | End: 2025-06-25

## 2025-06-25 RX ORDER — MORPHINE SULFATE 4 MG/ML
2 INJECTION, SOLUTION INTRAMUSCULAR; INTRAVENOUS EVERY 2 HOUR PRN
Status: DISCONTINUED | OUTPATIENT
Start: 2025-06-25 | End: 2025-06-27

## 2025-06-25 RX ORDER — SODIUM CHLORIDE 9 MG/ML
INJECTION, SOLUTION INTRAVENOUS CONTINUOUS
Status: DISCONTINUED | OUTPATIENT
Start: 2025-06-25 | End: 2025-06-25

## 2025-06-25 RX ORDER — CEPHALEXIN 500 MG/1
500 CAPSULE ORAL 2 TIMES DAILY
COMMUNITY
Start: 2025-06-24 | End: 2025-07-14 | Stop reason: CLARIF

## 2025-06-25 RX ORDER — ONDANSETRON 2 MG/ML
4 INJECTION INTRAMUSCULAR; INTRAVENOUS EVERY 4 HOURS PRN
Status: DISCONTINUED | OUTPATIENT
Start: 2025-06-25 | End: 2025-06-25

## 2025-06-25 RX ORDER — OXYBUTYNIN CHLORIDE 5 MG/1
5 TABLET ORAL 4 TIMES DAILY PRN
Status: DISCONTINUED | OUTPATIENT
Start: 2025-06-25 | End: 2025-06-27

## 2025-06-25 RX ORDER — HYDROCODONE BITARTRATE AND ACETAMINOPHEN 5; 325 MG/1; MG/1
1 TABLET ORAL EVERY 4 HOURS PRN
Status: DISCONTINUED | OUTPATIENT
Start: 2025-06-25 | End: 2025-06-27

## 2025-06-25 RX ORDER — SODIUM PHOSPHATE, DIBASIC AND SODIUM PHOSPHATE, MONOBASIC 7; 19 G/230ML; G/230ML
1 ENEMA RECTAL ONCE AS NEEDED
Status: DISCONTINUED | OUTPATIENT
Start: 2025-06-25 | End: 2025-06-27

## 2025-06-25 RX ORDER — DIPHENHYDRAMINE HYDROCHLORIDE 12.5 MG/5ML
12.5 SOLUTION ORAL 4 TIMES DAILY PRN
Status: DISCONTINUED | OUTPATIENT
Start: 2025-06-25 | End: 2025-06-27

## 2025-06-25 RX ORDER — METOCLOPRAMIDE HYDROCHLORIDE 5 MG/ML
5 INJECTION INTRAMUSCULAR; INTRAVENOUS ONCE
Status: COMPLETED | OUTPATIENT
Start: 2025-06-25 | End: 2025-06-25

## 2025-06-25 RX ORDER — MORPHINE SULFATE 4 MG/ML
4 INJECTION, SOLUTION INTRAMUSCULAR; INTRAVENOUS ONCE
Status: COMPLETED | OUTPATIENT
Start: 2025-06-25 | End: 2025-06-25

## 2025-06-25 RX ORDER — POLYETHYLENE GLYCOL 3350 17 G/17G
17 POWDER, FOR SOLUTION ORAL DAILY PRN
Status: DISCONTINUED | OUTPATIENT
Start: 2025-06-25 | End: 2025-06-27

## 2025-06-25 NOTE — ED INITIAL ASSESSMENT (HPI)
Pt presents to the ED with c/o known kidney stone with stent placement on Monday. Pt now c/o N/V and worse pain. Pt reports  decreased urine output. Pt reports taking norco and tylenol for pain. Pt awake and  alert, skin w/d,resps appear unlabored.

## 2025-06-25 NOTE — ED QUICK NOTES
Orders for admission, patient is aware of plan and ready to go upstairs. Any questions, please call ED RN Terri at extension [phone].     Patient Covid vaccination status: Fully vaccinated     COVID Test Ordered in ED: None    COVID Suspicion at Admission: N/A    Running Infusions: Medication Infusions[1] None    Mental Status/LOC at time of transport: AOx4    Other pertinent information:   CIWA score: N/A   NIH score:  N/A             [1]

## 2025-06-25 NOTE — H&P
[facility]     Austin Mcfarland Patient Status:  Emergency    1990 MRN [id number]   Location [facility] EMERGENCY DEPARTMENT Attending Harrison Bowens MD   Hosp Day # 0 PCP FRANK LOERA     Chief Complaint: Left flank pain    Subjective:    History of Present Illness:     Austin Mcfarland is a 34 year old male with a past medical history of congenital obstruction of the ureteropelvic junction status post multiple cystoscopies, stents, nephrostomy tube, buccal mucosal graft ureteroplasty and left ureterolysis who presented to the emergency department with flank pain.  Patient underwent left ureteral stent placement on  with initial improvement in his pain.  However he woke up this morning with recurring pain.  Patient also with rigors and chills, some mild nausea but no vomiting.  Denies any fevers, no dysuria, no diarrhea.  No chest pain or shortness of breath.  No other acute complaints.    History/Other:    Past Medical History:  Past Medical History[1]  Past Surgical History:   Past Surgical History[2]   Family History:   Family History[3]  Social History:    reports that he has never smoked. He has never used smokeless tobacco. He reports current alcohol use. He reports current drug use. Frequency: 7.00 times per week. Drug: Cannabis.     Allergies: Allergies[4]    Medications:  Medications Ordered Prior to Encounter[5]    Review of Systems:   A comprehensive review of systems was completed.    Pertinent positives and negatives noted in the HPI.    Objective:   Physical Exam:    /81   Pulse 64   Temp 97.9 °F (36.6 °C) (Oral)   Resp 15   Ht 6' 1\" (1.854 m)   Wt 169 lb 15.6 oz (77.1 kg)   SpO2 100%   BMI 22.43 kg/m²   General: No acute distress, Alert  Respiratory: No rhonchi, no wheezes  Cardiovascular: S1, S2. Regular rate and rhythm  Abdomen: Soft, TTP to left flank, non-distended, positive bowel sounds  Neuro: No new focal deficits  Extremities: No  edema    Results:    Labs:      Labs Last 24 Hours:    Recent Labs   Lab 06/23/25  0635 06/25/25  1257   RBC 5.01 4.89   HGB 15.5 15.2   HCT 43.6 42.1   MCV 87.0 86.1   MCH 30.9 31.1   MCHC 35.6 36.1   RDW 12.1 11.9   NEPRELIM 2.78 3.09   WBC 5.0 5.5   .0 199.0       Recent Labs   Lab 06/23/25  0635 06/25/25  1257   * 95   BUN 12 13   CREATSERUM 1.07 1.04   EGFRCR 94 97   CA 9.6 9.5   ALB 5.0* 5.0*    140   K 4.0 3.9    103   CO2 30.0 30.0   ALKPHO 82 79   AST 16 14   ALT 13 10   BILT 0.7 0.9   TP 7.8 7.7       Estimated Glomerular Filtration Rate: 97 mL/min/1.73m2 (result from lab).    No results found for: \"PT\", \"INR\"    No results for input(s): \"TROP\", \"TROPHS\", \"CK\" in the last 168 hours.    No results for input(s): \"TROP\", \"PBNP\" in the last 168 hours.    No results for input(s): \"PCT\" in the last 168 hours.    Imaging: Imaging data reviewed in Epic.    Assessment & Plan:      #Flank pain  #Ureteral Colic  #Left ureteral structure   #Recurrent nephrolithiasis   Recent cystoscopy with stent on 6/23  CT noted improvement in hydronephrosis  Patient with worsening pain this am  Urology consult   Either repeat cystoscopy with stent exchange vs nephrostomy tube placement  Pain control, IVF, NPO at midnight     #Complicated UTI  Cefazolin, f/u cultures     #Congenital Obstruction of Uteropelvic junction   S/p buccal mucosal graft uteroplasty  S/p left Ureterolysis    Regular f/u with urology    #Chronic pain   2/2 above  Medical marijuana as outpatient           Plan of care discussed with patient, er physician, nurse     Mathew Garner MD    Supplementary Documentation:     The 21st Century Cures Act makes medical notes like these available to patients in the interest of transparency. Please be advised this is a medical document. Medical documents are intended to carry relevant information, facts as evident, and the clinical opinion of the practitioner. The medical note is intended as peer to  peer communication and may appear blunt or direct. It is written in medical language and may contain abbreviations or verbiage that are unfamiliar.                                       [1]   Past Medical History:   Anxiety    Calculus of kidney    Congenital obstruction of ureteropelvic junction    Depression    Renal disorder    congenital left ureter stricture    [2]   Past Surgical History:  Procedure Laterality Date    Cystoscopy,+ureteroscopy Left 07/31/2010    s/p left rpg, left urs, left renoscopy, cysto, with stent placement 7-31-10 at [facility], Dr John Sheffield    Cystoscopy,+ureteroscopy Left 12/03/2010    L URS, Dr. Sheffield    Cystoscopy,+ureteroscopy Left     Cysto, URS, RPG, stent Bladder Stone removal-Dr Wilson    Cystoscopy,+ureteroscopy Left 03/19/2015    no stent placed, [facility]    Cystoscopy,insert ureteral stent  04/22/2014    Procedure: LITHOTRIPSY WITH CYSTOSCOPY, STENT PLACEMENT;  Surgeon: Dex Jane DO;  Location: [facility]    Cystoscopy,remv calculus,simple  12/27/2007    Performed by JOHN SHEFFIELD at [facility], [facility]    Cystoscopy,remv calculus,simple  05/23/2008    Performed by JOHN SHEFFIELD at [facility], [facility]    Cystourethroscopy Left 12/27/2010    cysto stent removal- Dr. Sheffield    Cystourethroscopy Left 12/19/2014    Cysto Stent Removal-Dr Jane    Cystourethroscopy,ureter catheter  03/10/2008    Performed by JOHN SHEFFIELD at [facility], [facility]    Cystourethroscopy,ureter catheter  04/22/2014    Procedure: LITHOTRIPSY WITH CYSTOSCOPY, STENT PLACEMENT;  Surgeon: Dex Jane DO;  Location: [facility]    Fragmenting of kidney stone  09/25/2007    Performed by JOHN SHEFFIELD at [facility], [facility]    Fragmenting of kidney stone  09/25/2007    Performed by JOHN SHEFFIELD at [facility], [facility]    Fragmenting of kidney stone  04/22/2014    Procedure: LITHOTRIPSY WITH CYSTOSCOPY, STENT PLACEMENT;  Surgeon: Buck  DO Dex;  Location: [facility], [facility]    Ir nephhrostomy tube  2007    Cysto stent removal, nephrostomy tube placement    Laparoscopy, surgical; pyeloplasty  10/11/2007    Left UPJ repair    Lithotripsy Left 06/21/2007    Left ESWL    Other surgical history  10/08/2016    Cysto, Lt RPG, Lt URS & Nephroscopy, Stone Manipulation, Stone Basket & Removal, Stent Placement-Dr. Calderon     Other surgical history      left pcnl cdh    Other surgical history      stent placement cdh    Other surgical history  12/05/2016    cysto stent removal dr calderon    Other surgical history  12/11/2020    Cysto/stent removal Dr. Su    Other surgical history  06/25/2021    CYSTOSCOPY, LEFT DIAGNOSTIC URETEROSCOPY, LEFT RETROGRADE PYELOGRAM, LEFT URETERAL STENT PLACEMENT,    Other surgical history  06/11/2021    CYSTOSCOPY,LEFT URETEROSCOPY,, RETROGRADE PYELOGRAM, LEFT STENT INSERTION    Other surgical history  01/21/2022    Cysto, Lt URS, RPHG, LL, Stone Extraction, Lt Stent Placement - Dr. Steel    Other surgical history  02/03/2022    Cysto Stent Removal- Dr. Steel    Renal endoscopy  03/10/2008    Performed by DEBORAH CALDERON at [facility]    Special service or report  05/09/2007    Cysto, stone extraction    Special service or report  09/20/2006    Neph tube insertion with endopyelotomy    Special service or report  10/21/2007    Larger ureteral stent placed    Urology surgery procedure unlisted  05/23/2008    Performed by DEBORAH CALDERON at [facility]    X-ray antegrade pyelogram tube  03/10/2008    Performed by DEBORAH CALDERON at [facility]    X-ray retrograde pyelogram  03/10/2008    Performed by DEBORAH CALDERON at [facility]    X-ray retrograde pyelogram  05/23/2008    Performed by DEBORAH CALDERON at [facility]    X-ray retrograde pyelogram  04/22/2014    Procedure: LITHOTRIPSY WITH CYSTOSCOPY, STENT PLACEMENT;  Surgeon: Dex Jane DO;  Location:  [facility] [place], [facility]   [3] No family history on file.  [4] No Known Allergies  [5]   Current Facility-Administered Medications on File Prior to Encounter   Medication Dose Route Frequency Provider Last Rate Last Admin    [COMPLETED] ondansetron (Zofran) 4 MG/2ML injection 4 mg  4 mg Intravenous Once Roula Donato MD   4 mg at 06/23/25 0640    [COMPLETED] sodium chloride 0.9 % IV bolus 1,000 mL  1,000 mL Intravenous Once Roula Donato MD   Stopped at 06/23/25 0750    [COMPLETED] HYDROmorphone (Dilaudid) 1 MG/ML injection 0.5 mg  0.5 mg Intravenous Once Roula Donato MD   0.5 mg at 06/23/25 0645    [COMPLETED] iopamidol 76% (ISOVUE-370) injection for power injector  100 mL Intravenous ONCE PRN Roula Donato MD   100 mL at 06/23/25 0726    [COMPLETED] ketorolac (Toradol) 15 MG/ML injection 15 mg  15 mg Intravenous Once Roula Donato MD   15 mg at 06/23/25 0748    [COMPLETED] HYDROmorphone (Dilaudid) 1 MG/ML injection 1 mg  1 mg Intravenous Once Roula Donato MD   1 mg at 06/23/25 0910    [COMPLETED] HYDROmorphone (Dilaudid) 1 MG/ML injection 1 mg  1 mg Intravenous Once Roula Donato MD   1 mg at 06/23/25 1137    [COMPLETED] ceFAZolin (Ancef) 2g in 10mL IV syringe premix  2 g Intravenous On Call to OR Monse Caldwell PA   2 g at 06/23/25 1830    [COMPLETED] ketorolac (Toradol) 15 MG/ML injection 15 mg  15 mg Intravenous Once Serjio Perdomo MD   15 mg at 06/16/25 1409    [COMPLETED] sodium chloride 0.9 % IV bolus 500 mL  500 mL Intravenous Once Serjio Perdomo MD   Stopped at 06/16/25 1550    [COMPLETED] HYDROmorphone (Dilaudid) 1 MG/ML injection 1 mg  1 mg Intravenous Once Serjio Perdomo MD   1 mg at 06/16/25 1409    [COMPLETED] HYDROmorphone (Dilaudid) 1 MG/ML injection 1 mg  1 mg Intravenous Once Serjio Perdomo MD   1 mg at 06/16/25 1548    [COMPLETED] heparin (Porcine) 5000 UNIT/ML injection 5,000 Units  5,000 Units Subcutaneous Once Charlene Johnson MD   5,000 Units at 05/23/25 1311    [COMPLETED]  ceFAZolin (Ancef) 2g in 10mL IV syringe premix  2 g Intravenous Once Charlene Johnson MD   2 g at 25 1328    [COMPLETED] ceFAZolin (Ancef) 2g in 10mL IV syringe premix  2 g Intravenous Q8H Charlene Johnson MD   Stopped at 25 0706    [COMPLETED] ondansetron (Zofran) 4 MG/2ML injection             [COMPLETED] HYDROmorphone (Dilaudid) 1 MG/ML injection             [] ketorolac (Toradol) 15 MG/ML injection 15 mg  15 mg Intravenous Q6H PRN Victorino Zaman MD   15 mg at 25 0003     Current Outpatient Medications on File Prior to Encounter   Medication Sig Dispense Refill    oxybutynin 5 MG Oral Tab Take 1 tablet (5 mg total) by mouth 4 (four) times daily as needed (bladder spasms). 30 tablet 0    cephALEXin 500 MG Oral Cap Take 1 capsule (500 mg total) by mouth 3 (three) times daily for 5 days. 15 capsule 0    HYDROcodone-acetaminophen 5-325 MG Oral Tab Take 1-2 tablets by mouth every 4 (four) hours as needed for Pain. 6 tablet 0    Naloxone HCl 4 MG/0.1ML Nasal Liquid 4 mg by Nasal route as needed. If patient remains unresponsive, repeat dose in other nostril 2-5 minutes after first dose. 1 kit 0    acetaminophen 500 MG Oral Tab Take 1-2 tablets (500-1,000 mg total) by mouth every 4 (four) hours as needed for Pain.      phenazopyridine 100 MG Oral Tab Take 1 tablet (100 mg total) by mouth 3 (three) times daily as needed. (Patient not taking: Reported on 2025) 10 tablet 1

## 2025-06-25 NOTE — CONSULTS
[facility]   part of [facility]    Report of Consultation    Austin Mcfarland Patient Status:  Emergency    1990 MRN [id number]   Location [facility] EMERGENCY DEPARTMENT Attending Harrison Bowens MD   Hosp Day # 0 PCP FRANK LOERA     Date of Admission:  2025  Date of Consult:  2025  Reason for Consultation:   Left flank pain, recent stent    History of Present Illness:   Patient is a 34 year old male who was admitted to the hospital for Ureteral colic:      34 year old with a longstanding history of nephrolithiasis recurrent left ureteral stricture disease s/p prior buccal mucosal graft ureteroplasty, in the past as well as a more recent robotic ureterolysis with Dr. Johnson in .     Recent admission ion  for flank pain and left hydronephrosis. He was found to have moderate/severe left hydronephrosis and mucosal thickening.  He underwent cystoscopy, left retrograde pyelogram, and left ureteral stent placement with Dr. Toth on .     Operating findings suggested contrast did not drain well past the UPJ.  Stent was placed.     He was discharged home the same day with some improvement in his discomfort.    He presented to the ER again today with poorly controlled left flank pain.    Labs unremarkable. UA with mild pyuria, as expected post-procedurally.    Repeat CT shows appropriately positioned stent with improvement in his left hydronephrosis - it is now mild.  Multiple non-obstructing left upper pole calculi.     He remains uncomfortable.     Past Medical History  Past Medical History[1]    Past Surgical History  Past Surgical History[2]    Family History  Family History[3]    Social History  Short Social Hx on File[4]     Current Medications:  Current Hospital Medications[5]  Prescriptions Prior to Admission[6]    Allergies  Allergies[7]    Review of Systems:    Pertinent items are noted in HPI.    Physical Exam:   Blood pressure 132/81, pulse 66, temperature  97.9 °F (36.6 °C), temperature source Oral, resp. rate 15, height 73\", weight 169 lb 15.6 oz, SpO2 95%.    General appearance: alert, appears stated age, and cooperative  Head: Normocephalic, without obvious abnormality, atraumatic  Eyes: EOMI  Cardiovascular: regular rate and rhythm  Abdominal: Soft, NT/ND.   Extremities: extremities normal, atraumatic, no cyanosis or edema  Neurologic: Grossly normal  Psychiatric: calm    Results:     Laboratory Data:  Lab Results   Component Value Date    WBC 5.5 06/25/2025    HGB 15.2 06/25/2025    HCT 42.1 06/25/2025    .0 06/25/2025    CREATSERUM 1.04 06/25/2025    BUN 13 06/25/2025     06/25/2025    K 3.9 06/25/2025     06/25/2025    CO2 30.0 06/25/2025    GLU 95 06/25/2025    CA 9.5 06/25/2025    ALB 5.0 (H) 06/25/2025    ALKPHO 79 06/25/2025    TP 7.7 06/25/2025    AST 14 06/25/2025    ALT 10 06/25/2025    LIP 26 06/25/2025    ESRML 6 03/27/2025    CRP <0.40 03/27/2025    MG 2.1 03/27/2025         Imaging:  CT ABDOMEN+PELVIS KIDNEYSTONE 2D RNDR(NO IV,NO ORAL)(CPT=74176)  Result Date: 6/25/2025  CONCLUSION: 1. Compared to the CT from 6/23/2025 there is been interval placement of a left ureteral stent. There is interval increase in mucosal thickening involving the left renal pelvis and proximal to mid left ureter. Findings could reflect urinary tract infection or postprocedural change. The degree of hydronephrosis has improved with mild residual hydronephrosis present. 2. There are numerous nonobstructing calculi within the left kidney. 3. There is a 6 mm hyperdense cyst seen in the midpole of the right kidney. Electronically Verified and Signed by Attending Radiologist: Michael Cortes MD 6/25/2025 4:46 PM Workstation: [id number]    XR OR - N/C  Result Date: 6/23/2025  CONCLUSION:  Multiple fluoroscopic images demonstrate retrograde cannulation and opacification of the left collecting system.  Please refer to dedicated procedure report for full detail.    LOCATION:  [place] [id number]    Dictated by (CST): Daniel Potter MD on 6/23/2025 at 7:17 PM     Finalized by (CST): Daniel Potter MD on 6/23/2025 at 7:17 PM            Impression:   34 year old gentleman with a history of recurrent nephrolithiasis and a left ureteral stricture, s/p prior buccal mucosal graft ureteroplasty, and more recent left ureterolysis with Dr. Johnson in March, 2025.  He underwent cystoscopy and left ureteral stent placement with Dr. Toth on 6/23 with mild improvement in pain, but pain has now recurred and is not well controlled.     -CT shows interval improvement in his left hydronephrosis s/p stent placement.  It is now mild.  Stent appears appropriately positioned.   -We discussed admission for pain control. His UA is not suggestive of an infection.  -We discussed if there is ongoing pain, despite the stent being positioned, we can consider either proceeding with a cystoscopy and stent exchange, with upsizing of his stent, or he could have a left nephrostomy tube placed.  We will observe overnight with pain control to see if he can get any relief. If not, he will need to consider which of these options he'd prefer. He understands that given the fact that the stent is well positioned, hydronephrosis is somewhat improved, I do not think a stent exchange is highly likely to improve his pain. A nephrostomy tube may be a more definitive option. He has had these in the past.  He will think about things overnight.     Urology will follow along.     Thank you for allowing me to participate in the care of your patient.    Param Su MD  6/25/2025         [1]   Past Medical History:   Anxiety    Calculus of kidney    Congenital obstruction of ureteropelvic junction    Depression    Renal disorder    congenital left ureter stricture    [2]   Past Surgical History:  Procedure Laterality Date    Cystoscopy,+ureteroscopy Left 07/31/2010    s/p left rpg, left urs, left renoscopy, cysto, with stent placement  7-31-10 at [facility], Dr John Sheffield    Cystoscopy,+ureteroscopy Left 12/03/2010    L URS, Dr. Sheffield    Cystoscopy,+ureteroscopy Left     Cysto, URS, RPG, stent Bladder Stone removal-Dr Wilson    Cystoscopy,+ureteroscopy Left 03/19/2015    no stent placed, [facility] DIONY    Cystoscopy,insert ureteral stent  04/22/2014    Procedure: LITHOTRIPSY WITH CYSTOSCOPY, STENT PLACEMENT;  Surgeon: Dex Jane DO;  Location: [facility]    Cystoscopy,remv calculus,simple  12/27/2007    Performed by JOHN SHEFFIELD at [facility], [facility]    Cystoscopy,remv calculus,simple  05/23/2008    Performed by JOHN SHEFFIELD at [facility], [facility]    Cystourethroscopy Left 12/27/2010    cysto stent removal- Dr. Sheffield    Cystourethroscopy Left 12/19/2014    Cysto Stent Removal-Dr Jane    Cystourethroscopy,ureter catheter  03/10/2008    Performed by JOHN SHEFFIELD at [facility], [facility]    Cystourethroscopy,ureter catheter  04/22/2014    Procedure: LITHOTRIPSY WITH CYSTOSCOPY, STENT PLACEMENT;  Surgeon: Dex Jane DO;  Location: [facility]    Fragmenting of kidney stone  09/25/2007    Performed by JOHN SHEFFIELD at [facility], [facility]    Fragmenting of kidney stone  09/25/2007    Performed by JOHN SHEFFIELD at [facility], [facility]    Fragmenting of kidney stone  04/22/2014    Procedure: LITHOTRIPSY WITH CYSTOSCOPY, STENT PLACEMENT;  Surgeon: Dex Jane DO;  Location: [facility]    Ir nephhrostomy tube  2007    Cysto stent removal, nephrostomy tube placement    Laparoscopy, surgical; pyeloplasty  10/11/2007    Left UPJ repair    Lithotripsy Left 06/21/2007    Left ESWL    Other surgical history  10/08/2016    Cysto, Lt RPG, Lt URS & Nephroscopy, Stone Manipulation, Stone Basket & Removal, Stent Placement-Dr. Sheffield     Other surgical history      left pcnl cdh    Other surgical history      stent placement cdh    Other surgical history  12/05/2016    cysto stent  removal dr calderon    Other surgical history  12/11/2020    Cysto/stent removal Dr. Su    Other surgical history  06/25/2021    CYSTOSCOPY, LEFT DIAGNOSTIC URETEROSCOPY, LEFT RETROGRADE PYELOGRAM, LEFT URETERAL STENT PLACEMENT,    Other surgical history  06/11/2021    CYSTOSCOPY,LEFT URETEROSCOPY,, RETROGRADE PYELOGRAM, LEFT STENT INSERTION    Other surgical history  01/21/2022    Cysto, Lt URS, RPHG, LL, Stone Extraction, Lt Stent Placement - Dr. Steel    Other surgical history  02/03/2022    Cysto Stent Removal- Dr. Steel    Renal endoscopy  03/10/2008    Performed by DEBORAH CALDERON at [facility]    Special service or report  05/09/2007    Cysto, stone extraction    Special service or report  09/20/2006    Neph tube insertion with endopyelotomy    Special service or report  10/21/2007    Larger ureteral stent placed    Urology surgery procedure unlisted  05/23/2008    Performed by DEBORAH CALDERON at [facility]    X-ray antegrade pyelogram tube  03/10/2008    Performed by DEBORAH CALDERON at [facility]    X-ray retrograde pyelogram  03/10/2008    Performed by DEBORAH CALDERON at [facility]    X-ray retrograde pyelogram  05/23/2008    Performed by DEBORAH CALDERON at [facility]    X-ray retrograde pyelogram  04/22/2014    Procedure: LITHOTRIPSY WITH CYSTOSCOPY, STENT PLACEMENT;  Surgeon: Dex Jane DO;  Location: [facility]   [3] No family history on file.  [4]   Social History  Socioeconomic History    Marital status: Single   Tobacco Use    Smoking status: Never    Smokeless tobacco: Never   Vaping Use    Vaping status: Never Used   Substance and Sexual Activity    Alcohol use: Yes     Comment: ocassionally    Drug use: Yes     Frequency: 7.0 times per week     Types: Cannabis     Comment: medical     Social Drivers of Health     Food Insecurity: No Food Insecurity (6/23/2025)    NCSS - Food Insecurity     Worried About Running  Out of Food in the Last Year: No     Ran Out of Food in the Last Year: No   Transportation Needs: No Transportation Needs (6/23/2025)    NCSS - Transportation     Lack of Transportation: No   Housing Stability: Not At Risk (6/23/2025)    NCSS - Housing/Utilities     Has Housing: Yes     Worried About Losing Housing: No     Unable to Get Utilities: No   [5]   No current facility-administered medications for this encounter.   [6] (Not in a hospital admission)  [7] No Known Allergies

## 2025-06-25 NOTE — ED PROVIDER NOTES
Patient Seen in: [facility] Emergency Department        History  Chief Complaint   Patient presents with    Abdomen/Flank Pain     Stated Complaint: flank pain    Subjective:   HPI            Patient is a 34-year-old male who had a left ureteral stent placed 2 days ago presents for evaluation of recurrent left-sided flank, left lower quadrant and suprapubic pain along with nausea, retching.  Symptoms started about 2 AM.  He reports he has a long history of procedures on that side due to prior stones as well as ureteral stricture which he has had surgically repaired.  Had just had cystoscopy and stent done 2 days ago and was feeling good until early this morning when the symptoms recurred.      Objective:     Past Medical History:    Anxiety    Calculus of kidney    Congenital obstruction of ureteropelvic junction    Depression    Renal disorder    congenital left ureter stricture               Past Surgical History:   Procedure Laterality Date    Cystoscopy,+ureteroscopy Left 07/31/2010    s/p left rpg, left urs, left renoscopy, cysto, with stent placement 7-31-10 at [facility], Dr John Sheffield    Cystoscopy,+ureteroscopy Left 12/03/2010    L URS, Dr. Sheffield    Cystoscopy,+ureteroscopy Left     Cysto, URS, RPG, stent Bladder Stone removal-Dr Wilson    Cystoscopy,+ureteroscopy Left 03/19/2015    no stent placed, [facility] DIONY    Cystoscopy,insert ureteral stent  04/22/2014    Procedure: LITHOTRIPSY WITH CYSTOSCOPY, STENT PLACEMENT;  Surgeon: Dex Jane DO;  Location: [facility]    Cystoscopy,remv calculus,simple  12/27/2007    Performed by JOHN SHEFFIELD at [facility]    Cystoscopy,remv calculus,simple  05/23/2008    Performed by JOHN SHEFFIELD at [facility]    Cystourethroscopy Left 12/27/2010    cysto stent removal- Dr. Sheffield    Cystourethroscopy Left 12/19/2014    Cysto Stent Removal-Dr Jane    Cystourethroscopy,ureter catheter  03/10/2008    Performed by JOHN SHEFFIELD  at [facility]    Cystourethroscopy,ureter catheter  04/22/2014    Procedure: LITHOTRIPSY WITH CYSTOSCOPY, STENT PLACEMENT;  Surgeon: Dex Jane DO;  Location: [facility]    Fragmenting of kidney stone  09/25/2007    Performed by DEBORAH CALDERON at [facility]    Fragmenting of kidney stone  09/25/2007    Performed by DEBORAH CALDERON at [facility]    Fragmenting of kidney stone  04/22/2014    Procedure: LITHOTRIPSY WITH CYSTOSCOPY, STENT PLACEMENT;  Surgeon: Dex Jane DO;  Location: [facility]    Ir nephhrostomy tube  2007    Cysto stent removal, nephrostomy tube placement    Laparoscopy, surgical; pyeloplasty  10/11/2007    Left UPJ repair    Lithotripsy Left 06/21/2007    Left ESWL    Other surgical history  10/08/2016    Cysto, Lt RPG, Lt URS & Nephroscopy, Stone Manipulation, Stone Basket & Removal, Stent Placement-Dr. Calderon     Other surgical history      left pcnl cdh    Other surgical history      stent placement cdh    Other surgical history  12/05/2016    cysto stent removal dr calderon    Other surgical history  12/11/2020    Cysto/stent removal Dr. Su    Other surgical history  06/25/2021    CYSTOSCOPY, LEFT DIAGNOSTIC URETEROSCOPY, LEFT RETROGRADE PYELOGRAM, LEFT URETERAL STENT PLACEMENT,    Other surgical history  06/11/2021    CYSTOSCOPY,LEFT URETEROSCOPY,, RETROGRADE PYELOGRAM, LEFT STENT INSERTION    Other surgical history  01/21/2022    Cysto, Lt URS, RPHG, LL, Stone Extraction, Lt Stent Placement - Dr. Steel    Other surgical history  02/03/2022    Cysto Stent Removal- Dr. Steel    Renal endoscopy  03/10/2008    Performed by DEBORAH CALDERON at [facility]    Special service or report  05/09/2007    Cysto, stone extraction    Special service or report  09/20/2006    Neph tube insertion with endopyelotomy    Special service or report  10/21/2007    Larger ureteral stent placed    Urology surgery procedure  unlisted  05/23/2008    Performed by DEBORAH KOHLI at [facility], [facility]    X-ray antegrade pyelogram tube  03/10/2008    Performed by DEBORAH KOHLI at [facility]    X-ray retrograde pyelogram  03/10/2008    Performed by DEBORAH KOHLI at [facility]    X-ray retrograde pyelogram  05/23/2008    Performed by DEBORAH KOHLI at [facility], [facility]    X-ray retrograde pyelogram  04/22/2014    Procedure: LITHOTRIPSY WITH CYSTOSCOPY, STENT PLACEMENT;  Surgeon: Dex Jane DO;  Location: [facility]                Social History     Socioeconomic History    Marital status: Single   Tobacco Use    Smoking status: Never    Smokeless tobacco: Never   Vaping Use    Vaping status: Never Used   Substance and Sexual Activity    Alcohol use: Yes     Comment: ocassionally    Drug use: Yes     Frequency: 7.0 times per week     Types: Cannabis     Comment: medical     Social Drivers of Health     Food Insecurity: No Food Insecurity (6/23/2025)    NCSS - Food Insecurity     Worried About Running Out of Food in the Last Year: No     Ran Out of Food in the Last Year: No   Transportation Needs: No Transportation Needs (6/23/2025)    NCSS - Transportation     Lack of Transportation: No   Housing Stability: Not At Risk (6/23/2025)    NCSS - Housing/Utilities     Has Housing: Yes     Worried About Losing Housing: No     Unable to Get Utilities: No                                Physical Exam    ED Triage Vitals [06/25/25 1249]   /80   Pulse 98   Resp 16   Temp 97.9 °F (36.6 °C)   Temp src Oral   SpO2 97 %   O2 Device None (Room air)       Current Vitals:   Vital Signs  BP: (!) 120/92  Pulse: 82  Resp: 15  Temp: 97.9 °F (36.6 °C)  Temp src: Oral  MAP (mmHg): 100    Oxygen Therapy  SpO2: 100 %  O2 Device: None (Room air)            Physical Exam  Vitals and nursing note reviewed.   Constitutional:       Appearance: He is well-developed.      Comments: Mildly uncomfortable.    HENT:      Head: Normocephalic and atraumatic.   Eyes:      Conjunctiva/sclera: Conjunctivae normal.      Pupils: Pupils are equal, round, and reactive to light.   Cardiovascular:      Rate and Rhythm: Normal rate and regular rhythm.      Heart sounds: Normal heart sounds.   Pulmonary:      Effort: Pulmonary effort is normal.      Breath sounds: Normal breath sounds.   Abdominal:      General: Bowel sounds are normal.      Palpations: Abdomen is soft.      Comments: Mild left lower quadrant tenderness.  Nondistended.  No rebound or guarding.   Musculoskeletal:         General: Normal range of motion.      Cervical back: Normal range of motion and neck supple.   Skin:     General: Skin is warm and dry.   Neurological:      Mental Status: He is alert and oriented to person, place, and time.                 ED Course  Labs Reviewed   COMP METABOLIC PANEL (14) - Abnormal; Notable for the following components:       Result Value    Albumin 5.0 (*)     All other components within normal limits   URINALYSIS WITH CULTURE REFLEX - Abnormal; Notable for the following components:    Urine Color Colorless (*)     Blood Urine 3+ (*)     Protein Urine 30 (*)     Leukocyte Esterase Urine 75 (*)     WBC Urine 6-10 (*)     RBC Urine >10 (*)     Squamous Epi. Cells Few (*)     All other components within normal limits   LIPASE - Normal   CBC WITH DIFFERENTIAL WITH PLATELET   URINE CULTURE, ROUTINE          CT ABDOMEN+PELVIS KIDNEYSTONE 2D RNDR(NO IV,NO ORAL)(CPT=74176)  Result Date: 6/25/2025  PROCEDURE: CT ABDOMEN+PELVIS KIDNEYSTONE 2D RNDR(NO IV,NO ORAL)(CPT=74176) INDICATIONS:  Recent left ureteral stent, increased flank pain PATIENT STATED HISTORY: Left flank pain, increased/painful urination, ureteral stent placed two days ago COMPARISON: CT of the abdomen pelvis from 6/23/2025 TECHNIQUE: Multi-slice CT images of the chest, abdomen, and pelvis were performed with intravenous contrast material. Automated exposure control  techniques for dose reduction were used, adjustment of the mA and/or kV were based on patient's size. Use of iterative reconstruction technique for dose reduction was used. Dose information is transmitted to the ACR (American College of Radiology) NRDR (National Radiology Data Registry) which includes the Dose Index Registry. FINDINGS: ABDOMEN/PELVIS: LIVER: Homogeneous enhancement. There is no focal mass or enlargement. BILIARY: No biliary ductal dilatation. The gallbladder has a normal appearance.  PANCREAS: Homogeneous enhancement. No mass, enlargement or ductal dilatation. SPLEEN: No enlargement or focal lesion. KIDNEYS: There is been interval placement of a left ureteral stent. The proximal pigtail is located within the midpole minor calyx/infundibulum. There is a 3 mm nonobstructing calculus seen in the midpole of the left kidney. There are numerous calculi identified within the lower pole the left kidney measuring between 1 mm and 5 mm. Small 3 mm calculus is also seen in the upper pole of the left kidney. No definitive ureteral calculi identified. There is mild left hydronephrosis which has improved compared to 6/23/2025. Urothelial thickening of the renal pelvis and proximal to mid left ureter noted is noted which has mildly worsened. This could reflect infection or inflammation. No hydroureter. No calculi are seen along the course of the ureteral stent in the ureter or bladder. There is a 6 mm hyperdense cyst seen within the mid to lower pole of the right kidney. ADRENALS: No mass or enlargement. AORTA/VASCULAR: No aneurysm or dissection. RETROPERITONEUM: No adenopathy or mass. BOWEL/MESENTERY: There is a moderate stool burden. There is a normal appendix. There is no evidence for bowel obstruction. There is a small amount of free pelvic fluid in the cul-de-sac which is slightly increased. No free air. ABDOMINAL WALL: No ventral wall hernia. URINARY BLADDER: Left ureteral stent distal pigtail is located in  the lumen of the urinary bladder. There is a small dot of gas in the bladder consistent with recent stent placement no bladder calculi. No significant bladder wall thickening seen. PELVIC ORGANS: Prostate calcifications are noted. No significant enlargement BONES: No acute osseous abnormality identified.     CONCLUSION: 1. Compared to the CT from 6/23/2025 there is been interval placement of a left ureteral stent. There is interval increase in mucosal thickening involving the left renal pelvis and proximal to mid left ureter. Findings could reflect urinary tract infection or postprocedural change. The degree of hydronephrosis has improved with mild residual hydronephrosis present. 2. There are numerous nonobstructing calculi within the left kidney. 3. There is a 6 mm hyperdense cyst seen in the midpole of the right kidney. Electronically Verified and Signed by Attending Radiologist: Michael Cortes MD 6/25/2025 4:46 PM Workstation: [id number]                      [facility]     Pleasant 34-year-old male with history of ureteral stricture, prior stones, recent cystoscopy and stent presenting for evaluation of recurrent left-sided flank pain.  Differential includes SEVEN, pyelonephritis, recurrent stricture, stent dislodgment.  Labs and CT ordered.  Will discuss case with urology.    Admission disposition: 6/25/2025  5:16 PM       Update at 5:10 PM.  Labs unremarkable.  Some leukocyte esterase, white cells and red cells but no definite UTI.  CT as above demonstrates improved hydronephrosis with no evidence of stent malplacement or dislodgment.  However patient has continued to have pain here despite Toradol, 2 dose of morphine and IV lidocaine per alto protocol.  Case discussed with Dr. Su of urology who did see the patient here.  In light of his continued pain he recommends we keep him overnight tonight, pain control, IV fluids and he will reevaluate him tomorrow.  Will consider nephrostomy placement if pain is  persisting.  Patient is comfortable with the plan.      Past Medical History-ureteral stricture, kidney stones    Differential diagnosis before testing included infection, recurrent hydronephrosis, recurrent stone    Co-morbidities that add to the complexity of management include: None    Testing ordered during this visit included labs, UA, CT    Radiographic images  I personally reviewed the radiographs and my individual interpretation shows mild but improved hydronephrosis  I also reviewed the official reports that showed mild but improved hydronephrosis    External chart review showed reviewed recent op note, imaging studies, consults      Discussion of management with hospitalist, urology      Medications Provided: Morphine, Toradol, lidocaine            Disposition:    Admission  I have discussed with the patient the results of test, differential diagnosis, and treatment plan. They expressed clear understanding of these instructions and agrees to the plan provided.         Medical Decision Making      Disposition and Plan     Clinical Impression:  1. Ureteral colic         Disposition:  Admit  6/25/2025  5:16 pm    Follow-up:  No follow-up provider specified.        Medications Prescribed:  Current Discharge Medication List                Supplementary Documentation:         Hospital Problems       Present on Admission  Date Reviewed: 5/15/2025          ICD-10-CM Noted POA    * (Principal) Ureteral colic N23 6/25/2025 Unknown

## 2025-06-26 ENCOUNTER — APPOINTMENT (OUTPATIENT)
Dept: INTERVENTIONAL RADIOLOGY/VASCULAR | Facility: HOSPITAL | Age: 35
End: 2025-06-26
Attending: UROLOGY
Payer: COMMERCIAL

## 2025-06-26 PROBLEM — G89.29 OTHER CHRONIC PAIN: Status: ACTIVE | Noted: 2025-06-26

## 2025-06-26 PROBLEM — N13.5 URETERAL STRICTURE, LEFT: Status: ACTIVE | Noted: 2025-06-26

## 2025-06-26 PROBLEM — N20.0 RECURRENT NEPHROLITHIASIS: Status: ACTIVE | Noted: 2025-06-26

## 2025-06-26 PROBLEM — Q62.39 CONGENITAL OBSTRUCTION OF URETEROPELVIC JUNCTION: Status: ACTIVE | Noted: 2025-06-26

## 2025-06-26 LAB
ANION GAP SERPL CALC-SCNC: 8 MMOL/L (ref 0–18)
APTT PPP: 26.3 SECONDS (ref 23–36)
BASOPHILS # BLD AUTO: 0.05 X10(3) UL (ref 0–0.2)
BASOPHILS NFR BLD AUTO: 0.8 %
BUN BLD-MCNC: 10 MG/DL (ref 9–23)
CALCIUM BLD-MCNC: 8.9 MG/DL (ref 8.7–10.6)
CHLORIDE SERPL-SCNC: 105 MMOL/L (ref 98–112)
CO2 SERPL-SCNC: 31 MMOL/L (ref 21–32)
CREAT BLD-MCNC: 1.12 MG/DL (ref 0.7–1.3)
EGFRCR SERPLBLD CKD-EPI 2021: 89 ML/MIN/1.73M2 (ref 60–?)
EOSINOPHIL # BLD AUTO: 0.08 X10(3) UL (ref 0–0.7)
EOSINOPHIL NFR BLD AUTO: 1.3 %
ERYTHROCYTE [DISTWIDTH] IN BLOOD BY AUTOMATED COUNT: 12.1 %
GLUCOSE BLD-MCNC: 100 MG/DL (ref 70–99)
HCT VFR BLD AUTO: 39.7 % (ref 39–53)
HGB BLD-MCNC: 13.7 G/DL (ref 13–17.5)
IMM GRANULOCYTES # BLD AUTO: 0.01 X10(3) UL (ref 0–1)
IMM GRANULOCYTES NFR BLD: 0.2 %
INR BLD: 1.13 (ref 0.8–1.2)
LYMPHOCYTES # BLD AUTO: 1.78 X10(3) UL (ref 1–4)
LYMPHOCYTES NFR BLD AUTO: 28.8 %
MAGNESIUM SERPL-MCNC: 2.1 MG/DL (ref 1.6–2.6)
MCH RBC QN AUTO: 30.8 PG (ref 26–34)
MCHC RBC AUTO-ENTMCNC: 34.5 G/DL (ref 31–37)
MCV RBC AUTO: 89.2 FL (ref 80–100)
MONOCYTES # BLD AUTO: 0.51 X10(3) UL (ref 0.1–1)
MONOCYTES NFR BLD AUTO: 8.3 %
NEUTROPHILS # BLD AUTO: 3.75 X10 (3) UL (ref 1.5–7.7)
NEUTROPHILS # BLD AUTO: 3.75 X10(3) UL (ref 1.5–7.7)
NEUTROPHILS NFR BLD AUTO: 60.6 %
OSMOLALITY SERPL CALC.SUM OF ELEC: 297 MOSM/KG (ref 275–295)
PLATELET # BLD AUTO: 173 10(3)UL (ref 150–450)
POTASSIUM SERPL-SCNC: 4.3 MMOL/L (ref 3.5–5.1)
PROTHROMBIN TIME: 14.6 SECONDS (ref 11.6–14.8)
RBC # BLD AUTO: 4.45 X10(6)UL (ref 4.3–5.7)
SODIUM SERPL-SCNC: 144 MMOL/L (ref 136–145)
WBC # BLD AUTO: 6.2 X10(3) UL (ref 4–11)

## 2025-06-26 PROCEDURE — 99232 SBSQ HOSP IP/OBS MODERATE 35: CPT | Performed by: INTERNAL MEDICINE

## 2025-06-26 RX ORDER — LIDOCAINE HYDROCHLORIDE 10 MG/ML
INJECTION, SOLUTION INFILTRATION; PERINEURAL
Status: COMPLETED
Start: 2025-06-26 | End: 2025-06-26

## 2025-06-26 RX ORDER — LEVOFLOXACIN 5 MG/ML
500 INJECTION, SOLUTION INTRAVENOUS ONCE
Status: DISCONTINUED | OUTPATIENT
Start: 2025-06-26 | End: 2025-06-27

## 2025-06-26 RX ORDER — MIDAZOLAM HYDROCHLORIDE 1 MG/ML
INJECTION INTRAMUSCULAR; INTRAVENOUS
Status: COMPLETED
Start: 2025-06-26 | End: 2025-06-26

## 2025-06-26 RX ORDER — IOPAMIDOL 612 MG/ML
30 INJECTION, SOLUTION INTRAVASCULAR
Status: COMPLETED | OUTPATIENT
Start: 2025-06-26 | End: 2025-06-26

## 2025-06-26 NOTE — PLAN OF CARE
Assumed care of patient at 0700  PRN Morphine for pain. PRN Zofran for nausea  IVF, IV abx  IR nephrostomy drain placed, L side. See flowsheets for output. Yellow/clear and slightly bloody output. Patient reports improvement in pain since drain placed  Tolerating diet post procedure  Possible discharge tomorrow pending urology and pain control  Patient resting in bed, call light within reach      Problem: Patient/Family Goals  Goal: Patient/Family Long Term Goal  Description: Patient's Long Term Goal:     Interventions:  -   - See additional Care Plan goals for specific interventions  Outcome: Progressing  Goal: Patient/Family Short Term Goal  Description: Patient's Short Term Goal:     Interventions:   -   - See additional Care Plan goals for specific interventions  Outcome: Progressing     Problem: PAIN - ADULT  Goal: Verbalizes/displays adequate comfort level or patient's stated pain goal  Description: INTERVENTIONS:  - Encourage pt to monitor pain and request assistance  - Assess pain using appropriate pain scale  - Administer analgesics based on type and severity of pain and evaluate response  - Implement non-pharmacological measures as appropriate and evaluate response  - Consider cultural and social influences on pain and pain management  - Manage/alleviate anxiety  - Utilize distraction and/or relaxation techniques  - Monitor for opioid side effects  - Notify MD/LIP if interventions unsuccessful or patient reports new pain  - Anticipate increased pain with activity and pre-medicate as appropriate  Outcome: Progressing

## 2025-06-26 NOTE — PLAN OF CARE
Assumed care of 35 y/o male @1930  A/Ox4, RA  No tele  Regular diet, NPO @0000  Continent x 2, up adlib  IV Ancef q8 for UTI  IV Morphine q2 for pain  LAC PIV-infusing LR @125 mL/hr  Bed in lowest position, call light in reach and all needs met at this time        Problem: PAIN - ADULT  Goal: Verbalizes/displays adequate comfort level or patient's stated pain goal  Description: INTERVENTIONS:  - Encourage pt to monitor pain and request assistance  - Assess pain using appropriate pain scale  - Administer analgesics based on type and severity of pain and evaluate response  - Implement non-pharmacological measures as appropriate and evaluate response  - Consider cultural and social influences on pain and pain management  - Manage/alleviate anxiety  - Utilize distraction and/or relaxation techniques  - Monitor for opioid side effects  - Notify MD/LIP if interventions unsuccessful or patient reports new pain  - Anticipate increased pain with activity and pre-medicate as appropriate  Outcome: Progressing

## 2025-06-26 NOTE — PRE-SEDATION ASSESSMENT
[facility]   part of [facility] Pre-Procedure Sedation Assessment    History of snoring or sleep or apnea?   No    History of previous problems with anesthesia or sedation  No    Physical Findings:  Neck: nl ROM  CV: Regular rate  PULM: normal respiratory rate/effort    Mallampati Score:  II (hard and soft palate, upper portion of tonsils anduvula visible)    ASA Classification:   2. Patient with mild systemic disease    Plan:   -IV moderate sedation    William Barragan MD

## 2025-06-26 NOTE — PROGRESS NOTES
[facility] PROGRESS NOTE     Austin Mcfarland Patient Status:  Observation    1990 MRN [id number]   Location [facility] 0SW-A Attending Divina Spencer MD   Hosp Day # 0 PCP FRANK LOERA     Subjective:  Austin Mcfarland is a(n) 34 year old male, well known to me     Was  good  for 1 day and then Left pain again         Objective:  Blood pressure 136/76, pulse 61, temperature 97.8 °F (36.6 °C), temperature source Oral, resp. rate 16, height 6' 1\" (1.854 m), weight 169 lb 15.6 oz (77.1 kg), SpO2 98%.  General appearance: alert, appears stated age and cooperative  Eyes: sclera non-icteric, no redness   Mouth:  moist oral mucosa, no lesions  Lungs: Unlabored respirations  Abdomen: Soft, non-tender, not distended, bowel sounds present    Extremities: no edema, no calf pain, no skin discoloration  : scrotum with no lesions, bilateral descended and symmetric testicles, non-tender to palpation, urethra/meatus with no discharge, circumcised phallus with no lesions    Lab Results   Component Value Date    WBC 6.2 2025    HGB 13.7 2025    HCT 39.7 2025    .0 2025    CREATSERUM 1.12 2025    BUN 10 2025     2025    K 4.3 2025     2025    CO2 31.0 2025     2025    CA 8.9 2025    ALB 5.0 2025    ALKPHO 79 2025    BILT 0.9 2025    TP 7.7 2025    AST 14 2025    ALT 10 2025    LIP 26 2025    MG 2.1 2025     Lab Results   Component Value Date    COLORUR Colorless 2025    CLARITY Clear 2025    SPECGRAVITY 1.013 2025    GLUUR Normal 2025    BILUR Negative 2025    KETUR Negative 2025    BLOODURINE 3+ 2025    PHURINE 6.5 2025    PROUR 30 2025    UROBILINOGEN Normal 2025    NITRITE Negative 2025    LEUUR 75 2025     No results found for: \"PSA\"      Intake/Output Summary (Last 24 hours) at 2025 0944  Last  data filed at 6/26/2025 0343  Gross per 24 hour   Intake 2164 ml   Output --   Net 2164 ml       Images:     Assessment and Plan:     Left ureteral RPF  L flank pain  Left hydro   Dysfunctional voider    Needs UROD and  PFPT as out pat    Failed flomax  Refuses CIC       Left PCN  today  --  All risks, benefits and alternatives to surgery were discusssed in detail with the patient. Complications such as urosepsis, bleeding, infection, hematuria, urinary retention, clot retention, and the rare risk of urethral, bladder and/ or ureteral injury were detailed to the patient. The patient was given ample time to ask questions. All questions were answered.  After weighing the risks, benefits and alternatives, patient elects to proceed with surgery as planned.           Please give me a call on my cell if you have any questions of concerns.     Joseph Toth MD   [facility]  Department of Urology  Cell: [phone]  Office :[phone]    9:44 AM

## 2025-06-26 NOTE — DISCHARGE SUMMARY
[facility]     Austin Mcfarland Patient Status:  Observation    1990 MRN [id number]   Location [facility] 3NW-A Attending No att. providers found   Hosp Day # 0 PCP FRANK LOERA     Date of Admission:  2025  Date of Discharge:   2025    Discharge Disposition: Home or Self Care    Discharge Diagnosis:    #Left hydronephrosis       #Recurrent nephrolithiasis    History of Present Illness:    Austin Mcfarland is a 34 year old male with a past medical history of anxiety and kidney stones.  He developed left flank pain and had repeat CT that showed left hydronephrosis.      Brief Synopsis:    The patient was admitted due to left hydronephrosis.  He was seen by urology and had cystoscopy.  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  Is felt that the patient is a dysfunctional voider he has moderate to severe trabeculations.  He had double-J stents placed.  He remained stable afterwards and was eventually discharged home.    All diagnosis' and recommendations discussed with patient and/or family in detail.         59-90 High Risk  29-58 Medium Risk  0-28   Low Risk       TCM Follow-Up Recommendation:  LACE > 58: High Risk of readmission after discharge from the hospital.    Procedures during hospitalization:   Cystoscopy with double-J stent placement    Incidental or significant findings and recommendations (brief descriptions):  Moderate to severe trabeculations    Consultants:  Urology    Discharge Medication List:     Discharge Medications        START taking these medications        Instructions Prescription details   oxybutynin 5 MG Tabs  Commonly known as: Ditropan      Take 1 tablet (5 mg total) by mouth 4 (four) times daily as needed (bladder spasms).   Quantity: 30 tablet  Refills: 0            CHANGE how you take these medications        Instructions Prescription details   HYDROcodone-acetaminophen 5-325 MG Tabs  Commonly known as: [place]  What changed:  Another medication with the same name was removed. Continue taking this medication, and follow the directions you see here.      Take 1-2 tablets by mouth every 4 (four) hours as needed for Pain.   Quantity: 6 tablet  Refills: 0            CONTINUE taking these medications        Instructions Prescription details   acetaminophen 500 MG Tabs  Commonly known as: Tylenol Extra Strength      Take 1-2 tablets (500-1,000 mg total) by mouth every 4 (four) hours as needed for Pain.   Refills: 0     Naloxone HCl 4 MG/0.1ML Liqd      4 mg by Nasal route as needed. If patient remains unresponsive, repeat dose in other nostril 2-5 minutes after first dose.   Quantity: 1 kit  Refills: 0               Where to Get Your Medications        These medications were sent to [id number] - [place], [place] - [id number] AT [facility], [phone], [phone] [id number] , [facility] [address]      Phone: [phone]   oxybutynin 5 MG Tabs         [place]    Follow-up appointment:   No follow-up provider specified.    Vital signs:       Physical Exam:    General: No acute distress   Lungs: clear to auscultation  Cardiovascular: S1, S2  Abdomen: Soft      -----------------------------------------------------------------------------------------------  PATIENT DISCHARGE INSTRUCTIONS: See electronic chart    Huy Alanis MD    Total minutes spent on discharge plannin      The  Century Cures Act makes medical notes like these available to patients in the interest of transparency. Please be advised this is a medical document. Medical documents are intended to carry relevant information, facts as evident, and the clinical opinion of the practitioner. The medical note is intended as peer to peer communication and may appear blunt or direct. It is written in medical language and may contain abbreviations or verbiage that are unfamiliar.

## 2025-06-26 NOTE — DISCHARGE INSTRUCTIONS
Below is a list of referrals that you may follow-up with for treatment.  If you feel unsafe with self or experience an increase in symptoms, please return to nearest ED or call 911.     [facility]   [address]  [place] [address]  [phone]    [facility]  [address] [place] [address]  [phone]    [facility]  [address] [place] [address] [place] [address]  [phone]    [facility]  [address], [address] [address]  [phone]    [facility]  [address],   [place], [place] [address] [phone]    [facility]  [address]  [place] [address]  [phone]    [facility]  [address]  [place] [address]  [phone]    [facility]  [address]  [place] [address]  [phone]    [facility]  [address]  [place] [address]  [phone]

## 2025-06-26 NOTE — PROGRESS NOTES
[facility]   part of [facility]     Hospitalist Progress Note     Austin Mcfarland Patient Status:  Observation    1990 MRN [id number]   Location [facility] 0SW-A Attending Divina Spencer MD   Hosp Day # 0 PCP FRANK LOERA     Chief Complaint: Left flank pain    Subjective:     Patient status post left nephrostomy tube placement.  Denies any chest pain or shortness of breath.  Denies abdominal pain.  No nausea vomiting.    Objective:    Review of Systems:   A comprehensive review of systems was completed; pertinent positive and negatives stated in subjective.    Vital signs:  Temp:  [97.8 °F (36.6 °C)-98.2 °F (36.8 °C)] 97.8 °F (36.6 °C)  Pulse:  [58-92] 63  Resp:  [16-17] 16  BP: (120-136)/(75-92) 122/75  SpO2:  [94 %-100 %] 96 %    Physical Exam:    /75 (BP Location: Left arm)   Pulse 63   Temp 97.8 °F (36.6 °C) (Oral)   Resp 16   Ht 6' 1\" (1.854 m)   Wt 169 lb 15.6 oz (77.1 kg)   SpO2 96%   BMI 22.43 kg/m²     General: No acute distress  Respiratory: No wheezes, no rhonchi  Cardiovascular: S1, S2, regular rate and rhythm  Abdomen: Soft, Non-tender, non-distended, positive bowel sounds.  Left nephrostomy tube in place and draining bloodstained urine  Neuro: No new focal deficits.   Extremities: No edema      Diagnostic Data:    Labs:  Recent Labs   Lab 25  0635 25  1257 25  0648 25  0955   WBC 5.0 5.5 6.2  --    HGB 15.5 15.2 13.7  --    MCV 87.0 86.1 89.2  --    .0 199.0 173.0  --    INR  --   --   --  1.13       Recent Labs   Lab 25  0635 25  1257 25  0648   * 95 100*   BUN 12 13 10   CREATSERUM 1.07 1.04 1.12   CA 9.6 9.5 8.9   ALB 5.0* 5.0*  --     140 144   K 4.0 3.9 4.3    103 105   CO2 30.0 30.0 31.0   ALKPHO 82 79  --    AST 16 14  --    ALT 13 10  --    BILT 0.7 0.9  --    TP 7.8 7.7  --        Estimated Glomerular Filtration Rate: 89 mL/min/1.73m2 (result from lab).    No results for input(s): \"TROP\",  \"TROPHS\", \"CK\" in the last 168 hours.    Recent Labs   Lab 06/26/25  0955   PTP 14.6   INR 1.13                  Microbiology    Hospital Encounter on 06/25/25   1. Urine Culture, Routine     Status: None    Collection Time: 06/25/25 12:58 PM    Specimen: Urine, clean catch   Result Value Ref Range    Urine Culture No Growth at 18-24 hrs. N/A         Imaging: Reviewed in Epic.    Medications:    levofloxacin  500 mg Intravenous Once    ceFAZolin  2 g Intravenous Q8H       Assessment & Plan:      #Flank pain  #Ureteral Colic  #Left ureteral stricture   #Recurrent nephrolithiasis   Recent cystoscopy with stent on 6/23  CT noted improvement in hydronephrosis  Patient with worsening pain on admission  Seen by urology consult   Status post left nephrostomy tube placement on 6/26/2025 by William Leal MD   Pain control, IVF     #Complicated UTI  On empiric antibiotics with IV cefazolin  Urine culture done on 6/25/2025 with no growth for 18 to 24 hours.     #Congenital Obstruction of Uteropelvic junction   S/p buccal mucosal graft uteroplasty  S/p left Ureterolysis    Regular f/u with urology     #Chronic pain   2/2 above  Medical marijuana as outpatient     Discharge planning once cleared by urology, follow-up with urology as outpatient directed.  Follow-up with regular outpatient primary care physician FRANK LOERA within 1 week in office.    Divina Spencer MD    Supplementary Documentation:     Quality:  DVT Mechanical Prophylaxis:   SCDs, Early ambuation  DVT Pharmacologic Prophylaxis   Medication   None                Code Status: Not on file  Michaels: No urinary catheter in place  Michaels Duration (in days):   Central line:    BRANDIE:     Discharge is dependent on: Clinical progress  At this point Mr. Mcfarland is expected to be discharge to: Home    The 21st Century Cures Act makes medical notes like these available to patients in the interest of transparency. Please be advised this is a medical document. Medical documents  are intended to carry relevant information, facts as evident, and the clinical opinion of the practitioner. The medical note is intended as peer to peer communication and may appear blunt or direct. It is written in medical language and may contain abbreviations or verbiage that are unfamiliar.

## 2025-06-26 NOTE — PROCEDURES
[facility]   part of [facility]  Procedure Note    Austin Mcfarland Patient Status:  Observation    1990 MRN [id number]   Location [facility] INTERVENTIONAL SUITES Attending Divina Spencer MD    Day # 0 PCP FRANK LOERA     Procedure: Left nephrostomy tube placement    Pre-Procedure Diagnosis:  Hydronephrosis    Post-Procedure Diagnosis: Same    Anesthesia:  Sedation    Findings:  Hydronephrosis    Specimens: None    Blood Loss:  < 5 cc    Tourniquet Time: None  Complications:  None  Drains:  8.5 Fr    Secondary Diagnosis:  N/A    William Barragan MD  2025

## 2025-06-26 NOTE — BH PROGRESS NOTE
[facility] SOAP Note    Austin Mcfarland Patient Status:  Observation    1990 MRN [id number]   Location [facility] 0SW-A Attending Divina Spencer MD   Hosp Day # 0 PCP FRANK LOERA       Writer met with patient at bedside for PHQ-4 follow-up.  Writer introduced self and explained role.  Patient reports that he has been experiencing more stress and worry due to continued struggles with his health.     Patient's PHQ-4 score = 6.  Patient was alert and oriented times four.  Patient was calm and cooperative. Patient's thought process was linear and intact. No evidence of AVH.  Patient's speech was appropriate tone and volume. Mood was euthymic with congruent affect.     Patient appears to be having an appropriate reaction to situational stress.  Patient denies any SI or HI.     Patient was open and agreeable to outpatient providers. Patient was interested in therapy referrals. Writer attempted to schedule patient with [facility] however patient preferred a male therapist as well as the ability to see therapist both in-person and virtually.  [facility] was only able to schedule with a female therapist and was located in [place] however this is about an hour drive for patient so patient declined appointment with [facility].  Patient was open to a list of outpatient referrals. Referrals will be placed in patient's discharge instructions.      Namrata ARANA LCSW  2025  10:05 AM

## 2025-06-27 VITALS
SYSTOLIC BLOOD PRESSURE: 119 MMHG | HEART RATE: 66 BPM | DIASTOLIC BLOOD PRESSURE: 87 MMHG | BODY MASS INDEX: 22.53 KG/M2 | OXYGEN SATURATION: 95 % | HEIGHT: 73 IN | WEIGHT: 170 LBS | TEMPERATURE: 98 F | RESPIRATION RATE: 16 BRPM

## 2025-06-27 PROCEDURE — 99239 HOSP IP/OBS DSCHRG MGMT >30: CPT | Performed by: INTERNAL MEDICINE

## 2025-06-27 NOTE — PLAN OF CARE
PT A/O, 96% ON RA, AFEBRILE, IVF INFUSING, TOLERATING DIET, NEPHROSTOMY TUBE WITH YELLOW/SLIGHT PINK URINE, AND VOIDING IN BATHROOM, IV ANTIBIOTIC, RATED LT FLANK PAIN AT 6, GIVEN TORADOL WITH SLIGHT RELIEF, PT REQUESTED MORPHINE, INSTRUCTED PT ON POC    Problem: PAIN - ADULT  Goal: Verbalizes/displays adequate comfort level or patient's stated pain goal  Description: INTERVENTIONS:  - Encourage pt to monitor pain and request assistance  - Assess pain using appropriate pain scale  - Administer analgesics based on type and severity of pain and evaluate response  - Implement non-pharmacological measures as appropriate and evaluate response  - Consider cultural and social influences on pain and pain management  - Manage/alleviate anxiety  - Utilize distraction and/or relaxation techniques  - Monitor for opioid side effects  - Notify MD/LIP if interventions unsuccessful or patient reports new pain  - Anticipate increased pain with activity and pre-medicate as appropriate  Outcome: Progressing

## 2025-06-27 NOTE — PLAN OF CARE
Pt discharged home accompanied by family member. Discharge instructions given with pt verbalizing understanding.

## 2025-06-27 NOTE — PROGRESS NOTES
[facility]   part of [facility]     Hospitalist Progress Note     Austin Mcfarland Patient Status:  Observation    1990 MRN [id number]   Location [facility] 0SW-A Attending Divina Spencer MD   Hosp Day # 0 PCP FRANK LOERA     Chief Complaint: Left flank pain    Subjective:     status post left nephrostomy tube placement with good urine output in the nephrostomy bag.  Denies any pain at this time.  Denies any chest pain or shortness of breath.  Denies abdominal pain.  No nausea vomiting.  Eager to go home    Objective:    Review of Systems:   A comprehensive review of systems was completed; pertinent positive and negatives stated in subjective.    Vital signs:  Temp:  [97.7 °F (36.5 °C)-98.1 °F (36.7 °C)] 97.7 °F (36.5 °C)  Pulse:  [62-75] 75  Resp:  [16] 16  BP: (108-140)/(74-96) 125/96  SpO2:  [91 %-98 %] 92 %    Physical Exam:    BP (!) 125/96 (BP Location: Left arm)   Pulse 75   Temp 97.7 °F (36.5 °C) (Oral)   Resp 16   Ht 6' 1\" (1.854 m)   Wt 169 lb 15.6 oz (77.1 kg)   SpO2 92%   BMI 22.43 kg/m²     General: No acute distress  Respiratory: No wheezes, no rhonchi  Cardiovascular: S1, S2, regular rate and rhythm  Abdomen: Soft, Non-tender, non-distended, positive bowel sounds.  Left nephrostomy tube in place and draining bloodstained urine  Neuro: No new focal deficits.   Extremities: No edema      Diagnostic Data:    Labs:  Recent Labs   Lab 25  0635 25  1257 25  0648 25  0955   WBC 5.0 5.5 6.2  --    HGB 15.5 15.2 13.7  --    MCV 87.0 86.1 89.2  --    .0 199.0 173.0  --    INR  --   --   --  1.13       Recent Labs   Lab 25  0635 25  1257 25  0648   * 95 100*   BUN 12 13 10   CREATSERUM 1.07 1.04 1.12   CA 9.6 9.5 8.9   ALB 5.0* 5.0*  --     140 144   K 4.0 3.9 4.3    103 105   CO2 30.0 30.0 31.0   ALKPHO 82 79  --    AST 16 14  --    ALT 13 10  --    BILT 0.7 0.9  --    TP 7.8 7.7  --        Estimated Glomerular  Filtration Rate: 89 mL/min/1.73m2 (result from lab).    No results for input(s): \"TROP\", \"TROPHS\", \"CK\" in the last 168 hours.    Recent Labs   Lab 06/26/25  0955   PTP 14.6   INR 1.13                  Microbiology    Hospital Encounter on 06/25/25   1. Urine Culture, Routine     Status: None    Collection Time: 06/25/25 12:58 PM    Specimen: Urine, clean catch   Result Value Ref Range    Urine Culture No Growth at 18-24 hrs. N/A         Imaging: Reviewed in Epic.    Medications:    ceFAZolin  2 g Intravenous Q8H       Assessment & Plan:      #Flank pain  #Ureteral Colic  #Left ureteral stricture   #Recurrent nephrolithiasis   Recent cystoscopy with stent on 6/23  CT noted improvement in hydronephrosis  Patient with worsening pain on admission  Seen by urology consult   Status post left nephrostomy tube placement on 6/26/2025 by William Leal MD   Pain control, IVF     #Complicated UTI  On empiric antibiotics with IV cefazolin  Urine culture done on 6/25/2025 with no growth for 18 to 24 hours.     #Congenital Obstruction of Uteropelvic junction   S/p buccal mucosal graft uteroplasty  S/p left Ureterolysis    Regular f/u with urology     #Chronic pain   2/2 above  Medical marijuana as outpatient     Discharge planning once cleared by urology, follow-up with urology as outpatient directed.  Follow-up with regular outpatient primary care physician FRANK LOERA within 1 week in office.    Divina Spencer MD    Supplementary Documentation:     Quality:  DVT Mechanical Prophylaxis:   SCDs, Early ambuation  DVT Pharmacologic Prophylaxis   Medication   None                Code Status: Not on file  Michaels: No urinary catheter in place  Michaels Duration (in days):   Central line:    BRANDIE:     Discharge is dependent on: Clinical progress  At this point Mr. Mcfarland is expected to be discharge to: Home    The 21st Century Cures Act makes medical notes like these available to patients in the interest of transparency. Please be advised  this is a medical document. Medical documents are intended to carry relevant information, facts as evident, and the clinical opinion of the practitioner. The medical note is intended as peer to peer communication and may appear blunt or direct. It is written in medical language and may contain abbreviations or verbiage that are unfamiliar.

## 2025-06-27 NOTE — DISCHARGE SUMMARY
[facility]  Discharge Summary    Austin Mcfarland Patient Status:  Observation    1990 MRN [id number]   Location [facility] 0SW-A Attending No att. providers found   Hosp Day # 0 PCP FRANK LOERA     Date of Admission: 2025    Date of Discharge: 2025      Disposition: Home or Self Care    Discharge Diagnosis:   #Flank pain  #Ureteral Colic  #Left ureteral stricture   #Recurrent nephrolithiasis   Recent cystoscopy with stent on   CT noted improvement in hydronephrosis  Patient with worsening pain on admission  Seen by urology consult   Status post left nephrostomy tube placement on 2025 by William Leal MD      # No UTI  Initially treated on empiric antibiotics with IV cefazolin  Urine culture done on 2025 with no growth for 18 to 24 hours.     #Congenital Obstruction of Uteropelvic junction   S/p buccal mucosal graft uteroplasty  S/p left Ureterolysis    Regular f/u with urology     #Chronic pain due to above       Chief Complaint:   Chief Complaint   Patient presents with    Abdomen/Flank Pain       History of Present Illness:   Austin Mcfarland is a 34 year old male with a past medical history of congenital obstruction of the ureteropelvic junction status post multiple cystoscopies, stents, nephrostomy tube, buccal mucosal graft ureteroplasty and left ureterolysis who presented to the emergency department with flank pain.  Patient underwent left ureteral stent placement on  with initial improvement in his pain.  However he woke up this morning with recurring pain.  Patient also with rigors and chills, some mild nausea but no vomiting.  Denies any fevers, no dysuria, no diarrhea.  No chest pain or shortness of breath.  No other acute complaints.   Please refer to history and physical done by Dr. Garner for details on admission    Brief Synopsis:     #Flank pain  #Ureteral Colic  #Left ureteral stricture   #Recurrent nephrolithiasis   Recent cystoscopy with stent on  6/23  CT noted improvement in hydronephrosis  Patient with worsening pain on admission  Seen by urology consult   Status post left nephrostomy tube placement on 6/26/2025 by William Leal MD   Pain control, IVF     #Complicated UTI  Initially treated on empiric antibiotics with IV cefazolin  Urine culture done on 6/25/2025 with no growth for 18 to 24 hours.     #Congenital Obstruction of Uteropelvic junction   S/p buccal mucosal graft uteroplasty  S/p left Ureterolysis    Regular f/u with urology     #Chronic pain   2/2 above  Medical marijuana as outpatient      Discharged today once cleared by urology, follow-up with urology as outpatient directed.  Follow-up with regular outpatient primary care physician FRANK LOERA within 1 week in office.      TCM Diagnosis at discharge from Hospital: Other: See above; still recommend for TCM follow-up    Lace+ Score: 57  59-90 High Risk  29-58 Medium Risk  0-28   Low Risk.     TCM Follow-Up Recommendation:Austin Mcfarland   LACE 29-58: Moderate Risk of readmission after discharge from the hospital.      PCP: FRANK LOERA    Consultations:   Consultants         Provider   Role Specialty     Param Su MD       Consulting Physician UROLOGY                Procedures during hospitalization:   None    Incidental or significant findings and recommendations (brief descriptions):  None    Lab/Test results pending at Discharge:   None      Discharge Medications:        Discharge Medications        CONTINUE taking these medications        Instructions Prescription details   acetaminophen 500 MG Tabs  Commonly known as: Tylenol Extra Strength      Take 1-2 tablets (500-1,000 mg total) by mouth every 4 (four) hours as needed for Pain.   Refills: 0     cephALEXin 500 MG Caps  Commonly known as: Keflex      Take 1 capsule (500 mg total) by mouth 2 (two) times daily.   Stop taking on: June 29, 2025  Refills: 0     HYDROcodone-acetaminophen 5-325 MG Tabs  Commonly known as: Norco       Take 1-2 tablets by mouth every 4 (four) hours as needed for Pain.   Quantity: 6 tablet  Refills: 0     Naloxone HCl 4 MG/0.1ML Liqd      4 mg by Nasal route as needed. If patient remains unresponsive, repeat dose in other nostril 2-5 minutes after first dose.   Quantity: 1 kit  Refills: 0     oxybutynin 5 MG Tabs  Commonly known as: Ditropan      Take 1 tablet (5 mg total) by mouth 4 (four) times daily as needed (bladder spasms).   Quantity: 30 tablet  Refills: 0               [place] prescription monitoring program data reviewed in epic before prescribing narcotics/controlled substances: Yes    Follow up Visits: Follow-up with FRANK LOERA in 1 week    Frank Loera  [phone]    Schedule an appointment as soon as possible for a visit in 1 week(s)      Charlene Johnson MD  [address]  [address]  [place] [place] [facility] [address]  [phone]    Schedule an appointment as soon as possible for a visit in 1 week(s)      Appointments for Next 30 Days 6/27/2025 - 7/27/2025      None            Physical Exam:  /87 (BP Location: Left arm)   Pulse 66   Temp 98.1 °F (36.7 °C) (Oral)   Resp 16   Ht 6' 1\" (1.854 m)   Wt 169 lb 15.6 oz (77.1 kg)   SpO2 95%   BMI 22.43 kg/m²     General: No acute distress  Respiratory: No wheezes, no rhonchi  Cardiovascular: S1, S2, regular rate and rhythm  Abdomen: Soft, Non-tender, non-distended, positive bowel sounds.  Left nephrostomy tube in place and draining bloodstained urine  Neuro: No new focal deficits.   Extremities: No edema    Discharge Condition: stable    Patient Discharge Instructions: See electronic chart      More than 30 minutes on discharge    Divina Spencer MD  6/27/2025

## 2025-07-14 ENCOUNTER — HOSPITAL ENCOUNTER (INPATIENT)
Facility: HOSPITAL | Age: 35
LOS: 1 days | Discharge: HOME | End: 2025-07-16
Attending: EMERGENCY MEDICINE | Admitting: HOSPITALIST
Payer: COMMERCIAL

## 2025-07-14 ENCOUNTER — APPOINTMENT (OUTPATIENT)
Dept: CT IMAGING | Facility: HOSPITAL | Age: 35
End: 2025-07-14
Attending: EMERGENCY MEDICINE
Payer: COMMERCIAL

## 2025-07-14 ENCOUNTER — HOSPITAL ENCOUNTER (INPATIENT)
Facility: HOSPITAL | Age: 35
LOS: 1 days | Discharge: HOME | DRG: 699 | End: 2025-07-16
Attending: EMERGENCY MEDICINE | Admitting: HOSPITALIST
Payer: COMMERCIAL

## 2025-07-14 ENCOUNTER — APPOINTMENT (OUTPATIENT)
Dept: CT IMAGING | Facility: HOSPITAL | Age: 35
DRG: 699 | End: 2025-07-14
Attending: EMERGENCY MEDICINE
Payer: COMMERCIAL

## 2025-07-14 DIAGNOSIS — N30.01 ACUTE CYSTITIS WITH HEMATURIA: Primary | ICD-10-CM

## 2025-07-14 DIAGNOSIS — N23 URETERAL COLIC: ICD-10-CM

## 2025-07-14 DIAGNOSIS — Z96.0 URETERAL STENT RETAINED: ICD-10-CM

## 2025-07-14 DIAGNOSIS — Z96.0 URETERAL STENT PRESENT: ICD-10-CM

## 2025-07-14 LAB
ALBUMIN SERPL-MCNC: 4.8 G/DL (ref 3.2–4.8)
ALBUMIN/GLOB SERPL: 1.6 (ref 1–2)
ALP LIVER SERPL-CCNC: 89 U/L (ref 45–117)
ALT SERPL-CCNC: 12 U/L (ref 10–49)
ANION GAP SERPL CALC-SCNC: 8 MMOL/L (ref 0–18)
AST SERPL-CCNC: 16 U/L (ref ?–34)
BASOPHILS # BLD AUTO: 0.05 X10(3) UL (ref 0–0.2)
BASOPHILS NFR BLD AUTO: 0.7 %
BILIRUB SERPL-MCNC: 0.7 MG/DL (ref 0.3–1.2)
BILIRUB UR QL STRIP.AUTO: NEGATIVE
BUN BLD-MCNC: 7 MG/DL (ref 9–23)
CALCIUM BLD-MCNC: 9 MG/DL (ref 8.7–10.6)
CHLORIDE SERPL-SCNC: 104 MMOL/L (ref 98–112)
CO2 SERPL-SCNC: 30 MMOL/L (ref 21–32)
CREAT BLD-MCNC: 1.01 MG/DL (ref 0.7–1.3)
EGFRCR SERPLBLD CKD-EPI 2021: 101 ML/MIN/1.73M2 (ref 60–?)
EOSINOPHIL # BLD AUTO: 0.09 X10(3) UL (ref 0–0.7)
EOSINOPHIL NFR BLD AUTO: 1.2 %
ERYTHROCYTE [DISTWIDTH] IN BLOOD BY AUTOMATED COUNT: 12.3 %
GLOBULIN PLAS-MCNC: 3 G/DL (ref 2–3.5)
GLUCOSE BLD-MCNC: 95 MG/DL (ref 70–99)
GLUCOSE UR STRIP.AUTO-MCNC: NORMAL MG/DL
HCT VFR BLD AUTO: 42.7 % (ref 39–53)
HGB BLD-MCNC: 15 G/DL (ref 13–17.5)
IMM GRANULOCYTES # BLD AUTO: 0.02 X10(3) UL (ref 0–1)
IMM GRANULOCYTES NFR BLD: 0.3 %
KETONES UR STRIP.AUTO-MCNC: NEGATIVE MG/DL
LACTATE SERPL-SCNC: 1.1 MMOL/L (ref 0.5–2)
LEUKOCYTE ESTERASE UR QL STRIP.AUTO: 500
LYMPHOCYTES # BLD AUTO: 1.38 X10(3) UL (ref 1–4)
LYMPHOCYTES NFR BLD AUTO: 18.2 %
MCH RBC QN AUTO: 30.6 PG (ref 26–34)
MCHC RBC AUTO-ENTMCNC: 35.1 G/DL (ref 31–37)
MCV RBC AUTO: 87.1 FL (ref 80–100)
MONOCYTES # BLD AUTO: 0.69 X10(3) UL (ref 0.1–1)
MONOCYTES NFR BLD AUTO: 9.1 %
NEUTROPHILS # BLD AUTO: 5.35 X10 (3) UL (ref 1.5–7.7)
NEUTROPHILS # BLD AUTO: 5.35 X10(3) UL (ref 1.5–7.7)
NEUTROPHILS NFR BLD AUTO: 70.5 %
NITRITE UR QL STRIP.AUTO: NEGATIVE
OSMOLALITY SERPL CALC.SUM OF ELEC: 292 MOSM/KG (ref 275–295)
PH UR STRIP.AUTO: 7 (ref 5–8)
PLATELET # BLD AUTO: 211 10(3)UL (ref 150–450)
POTASSIUM SERPL-SCNC: 4.2 MMOL/L (ref 3.5–5.1)
PROT SERPL-MCNC: 7.8 G/DL (ref 5.7–8.2)
PROT UR STRIP.AUTO-MCNC: 50 MG/DL
RBC # BLD AUTO: 4.9 X10(6)UL (ref 4.3–5.7)
RBC #/AREA URNS AUTO: >10 /HPF
SODIUM SERPL-SCNC: 142 MMOL/L (ref 136–145)
SP GR UR STRIP.AUTO: 1.02 (ref 1–1.03)
UROBILINOGEN UR STRIP.AUTO-MCNC: NORMAL MG/DL
WBC # BLD AUTO: 7.6 X10(3) UL (ref 4–11)
WBC #/AREA URNS AUTO: >50 /HPF

## 2025-07-14 PROCEDURE — 99223 1ST HOSP IP/OBS HIGH 75: CPT | Performed by: HOSPITALIST

## 2025-07-14 PROCEDURE — 74176 CT ABD & PELVIS W/O CONTRAST: CPT | Performed by: EMERGENCY MEDICINE

## 2025-07-14 RX ORDER — POLYETHYLENE GLYCOL 3350 17 G/17G
17 POWDER, FOR SOLUTION ORAL DAILY PRN
Status: DISCONTINUED | OUTPATIENT
Start: 2025-07-14 | End: 2025-07-16

## 2025-07-14 RX ORDER — KETOROLAC TROMETHAMINE 15 MG/ML
15 INJECTION, SOLUTION INTRAMUSCULAR; INTRAVENOUS ONCE
Status: COMPLETED | OUTPATIENT
Start: 2025-07-14 | End: 2025-07-14

## 2025-07-14 RX ORDER — KETOROLAC TROMETHAMINE 30 MG/ML
30 INJECTION, SOLUTION INTRAMUSCULAR; INTRAVENOUS EVERY 6 HOURS PRN
Status: DISCONTINUED | OUTPATIENT
Start: 2025-07-14 | End: 2025-07-16

## 2025-07-14 RX ORDER — SODIUM PHOSPHATE, DIBASIC AND SODIUM PHOSPHATE, MONOBASIC 7; 19 G/230ML; G/230ML
1 ENEMA RECTAL ONCE AS NEEDED
Status: DISCONTINUED | OUTPATIENT
Start: 2025-07-14 | End: 2025-07-16

## 2025-07-14 RX ORDER — MORPHINE SULFATE 4 MG/ML
4 INJECTION, SOLUTION INTRAMUSCULAR; INTRAVENOUS EVERY 2 HOUR PRN
Status: DISCONTINUED | OUTPATIENT
Start: 2025-07-14 | End: 2025-07-16

## 2025-07-14 RX ORDER — MORPHINE SULFATE 2 MG/ML
1 INJECTION, SOLUTION INTRAMUSCULAR; INTRAVENOUS EVERY 2 HOUR PRN
Status: DISCONTINUED | OUTPATIENT
Start: 2025-07-14 | End: 2025-07-16

## 2025-07-14 RX ORDER — SODIUM CHLORIDE 9 MG/ML
125 INJECTION, SOLUTION INTRAVENOUS CONTINUOUS
Status: DISCONTINUED | OUTPATIENT
Start: 2025-07-14 | End: 2025-07-15

## 2025-07-14 RX ORDER — ACETAMINOPHEN 500 MG
500 TABLET ORAL EVERY 4 HOURS PRN
Status: DISCONTINUED | OUTPATIENT
Start: 2025-07-14 | End: 2025-07-16

## 2025-07-14 RX ORDER — SODIUM CHLORIDE 9 MG/ML
INJECTION, SOLUTION INTRAVENOUS CONTINUOUS
Status: DISCONTINUED | OUTPATIENT
Start: 2025-07-14 | End: 2025-07-16

## 2025-07-14 RX ORDER — SENNOSIDES 8.6 MG
17.2 TABLET ORAL NIGHTLY PRN
Status: DISCONTINUED | OUTPATIENT
Start: 2025-07-14 | End: 2025-07-16

## 2025-07-14 RX ORDER — MORPHINE SULFATE 2 MG/ML
2 INJECTION, SOLUTION INTRAMUSCULAR; INTRAVENOUS EVERY 2 HOUR PRN
Status: DISCONTINUED | OUTPATIENT
Start: 2025-07-14 | End: 2025-07-16

## 2025-07-14 RX ORDER — BISACODYL 10 MG
10 SUPPOSITORY, RECTAL RECTAL
Status: DISCONTINUED | OUTPATIENT
Start: 2025-07-14 | End: 2025-07-16

## 2025-07-14 RX ORDER — KETOROLAC TROMETHAMINE 15 MG/ML
15 INJECTION, SOLUTION INTRAMUSCULAR; INTRAVENOUS EVERY 6 HOURS PRN
Status: DISCONTINUED | OUTPATIENT
Start: 2025-07-14 | End: 2025-07-16

## 2025-07-14 RX ORDER — ONDANSETRON 2 MG/ML
4 INJECTION INTRAMUSCULAR; INTRAVENOUS EVERY 6 HOURS PRN
Status: DISCONTINUED | OUTPATIENT
Start: 2025-07-14 | End: 2025-07-16

## 2025-07-14 RX ORDER — ONDANSETRON 2 MG/ML
4 INJECTION INTRAMUSCULAR; INTRAVENOUS ONCE
Status: COMPLETED | OUTPATIENT
Start: 2025-07-14 | End: 2025-07-14

## 2025-07-14 RX ORDER — HYDROMORPHONE HYDROCHLORIDE 1 MG/ML
0.5 INJECTION, SOLUTION INTRAMUSCULAR; INTRAVENOUS; SUBCUTANEOUS EVERY 30 MIN PRN
Refills: 0 | Status: COMPLETED | OUTPATIENT
Start: 2025-07-14 | End: 2025-07-14

## 2025-07-14 RX ORDER — OXYBUTYNIN CHLORIDE 5 MG/1
5 TABLET ORAL 4 TIMES DAILY PRN
Status: DISCONTINUED | OUTPATIENT
Start: 2025-07-14 | End: 2025-07-16

## 2025-07-14 RX ORDER — ENOXAPARIN SODIUM 100 MG/ML
40 INJECTION SUBCUTANEOUS DAILY
Status: DISCONTINUED | OUTPATIENT
Start: 2025-07-15 | End: 2025-07-16

## 2025-07-14 RX ORDER — PROCHLORPERAZINE EDISYLATE 5 MG/ML
5 INJECTION INTRAMUSCULAR; INTRAVENOUS EVERY 8 HOURS PRN
Status: DISCONTINUED | OUTPATIENT
Start: 2025-07-14 | End: 2025-07-16

## 2025-07-14 NOTE — ED QUICK NOTES
Pt requesting more pain medication. Message sent to Dr. Whittaker, awaiting reply at this time. Informed pt of this update.

## 2025-07-14 NOTE — PLAN OF CARE
Admission Navigator and PTA Med list complete  Patient from home   Currently resting in bed, awaiting bed placement

## 2025-07-14 NOTE — ED PROVIDER NOTES
Patient Seen in: [facility] Emergency Department        History  Chief Complaint   Patient presents with    Urinary Symptoms     Stated Complaint: nephrostomy, difficulty urinating, pain in abdomen, burning    Subjective:   HPI            34-year-old male comes to the hospital complaint of having difficulty with pain in the area of his left flank.  In addition as he has had increased urinary frequency from his penis as well as dysuria.  He has a history of having a left-sided ureteral stricture and at this time has a left-sided nephrostomy tube in place.  He believes the output has been normal.  The pain began at about 3:30 AM.  He has had associated nausea and vomiting.  He denies any known fevers.  He is no other chills or bodyaches.  He is denying any other complaints at this time.      Objective:     Past Medical History:    Anxiety    Calculus of kidney    Congenital obstruction of ureteropelvic junction    Depression    Renal disorder    congenital left ureter stricture               Past Surgical History:   Procedure Laterality Date    Cystoscopy,+ureteroscopy Left 07/31/2010    s/p left rpg, left urs, left renoscopy, cysto, with stent placement 7-31-10 at [facility], Dr John Sheffield    Cystoscopy,+ureteroscopy Left 12/03/2010    L URS, Dr. Sheffield    Cystoscopy,+ureteroscopy Left     Cysto, URS, RPG, stent Bladder Stone removal-Dr Wilson    Cystoscopy,+ureteroscopy Left 03/19/2015    no stent placed, [facility]    Cystoscopy,insert ureteral stent  04/22/2014    Procedure: LITHOTRIPSY WITH CYSTOSCOPY, STENT PLACEMENT;  Surgeon: Dex Jane DO;  Location: [facility]    Cystoscopy,remv calculus,simple  12/27/2007    Performed by JOHN SHEFFIELD at [facility], [facility]    Cystoscopy,remv calculus,simple  05/23/2008    Performed by JOHN SHEFFIELD at [facility], [facility]    Cystourethroscopy Left 12/27/2010    cysto stent removal- Dr. Sheffield    Cystourethroscopy Left 12/19/2014    Cysto Stent  Removal-Dr Jane    Cystourethroscopy,ureter catheter  03/10/2008    Performed by DEBORAH CALDERON at [facility]    Cystourethroscopy,ureter catheter  04/22/2014    Procedure: LITHOTRIPSY WITH CYSTOSCOPY, STENT PLACEMENT;  Surgeon: Dex Jane DO;  Location: [facility]    Fragmenting of kidney stone  09/25/2007    Performed by DEBORAH CALDERON at [facility]    Fragmenting of kidney stone  09/25/2007    Performed by DEBORAH CALDERON at [facility]    Fragmenting of kidney stone  04/22/2014    Procedure: LITHOTRIPSY WITH CYSTOSCOPY, STENT PLACEMENT;  Surgeon: Dex Jane DO;  Location: [facility]    Ir nephhrostomy tube  2007    Cysto stent removal, nephrostomy tube placement    Laparoscopy, surgical; pyeloplasty  10/11/2007    Left UPJ repair    Lithotripsy Left 06/21/2007    Left ESWL    Other surgical history  10/08/2016    Cysto, Lt RPG, Lt URS & Nephroscopy, Stone Manipulation, Stone Basket & Removal, Stent Placement-Dr. Calderon     Other surgical history      left pcnl cdh    Other surgical history      stent placement cdh    Other surgical history  12/05/2016    cysto stent removal dr calderon    Other surgical history  12/11/2020    Cysto/stent removal Dr. Su    Other surgical history  06/25/2021    CYSTOSCOPY, LEFT DIAGNOSTIC URETEROSCOPY, LEFT RETROGRADE PYELOGRAM, LEFT URETERAL STENT PLACEMENT,    Other surgical history  06/11/2021    CYSTOSCOPY,LEFT URETEROSCOPY,, RETROGRADE PYELOGRAM, LEFT STENT INSERTION    Other surgical history  01/21/2022    Cysto, Lt URS, RPHG, LL, Stone Extraction, Lt Stent Placement - Dr. Steel    Other surgical history  02/03/2022    Cysto Stent Removal- Dr. Steel    Renal endoscopy  03/10/2008    Performed by DEBORAH CALDERON at [facility]    Special service or report  05/09/2007    Cysto, stone extraction    Special service or report  09/20/2006    Neph tube insertion with endopyelotomy     Special service or report  10/21/2007    Larger ureteral stent placed    Urology surgery procedure unlisted  05/23/2008    Performed by DEBORAH KOHLI at [facility], [facility]    X-ray antegrade pyelogram tube  03/10/2008    Performed by DEBORAH KOHLI at [facility], [facility]    X-ray retrograde pyelogram  03/10/2008    Performed by DEBORAH KOHLI at [facility], [facility]    X-ray retrograde pyelogram  05/23/2008    Performed by DEBORAH KOHLI at [facility], [facility]    X-ray retrograde pyelogram  04/22/2014    Procedure: LITHOTRIPSY WITH CYSTOSCOPY, STENT PLACEMENT;  Surgeon: Dex Jane DO;  Location: [facility], [facility]                Social History     Socioeconomic History    Marital status: Single   Tobacco Use    Smoking status: Never    Smokeless tobacco: Never   Vaping Use    Vaping status: Never Used   Substance and Sexual Activity    Alcohol use: Yes     Comment: ocassionally    Drug use: Yes     Frequency: 7.0 times per week     Types: Cannabis     Comment: medical     Social Drivers of Health     Food Insecurity: No Food Insecurity (6/25/2025)    NCSS - Food Insecurity     Worried About Running Out of Food in the Last Year: No     Ran Out of Food in the Last Year: No   Transportation Needs: No Transportation Needs (6/25/2025)    NCSS - Transportation     Lack of Transportation: No   Housing Stability: Not At Risk (6/25/2025)    NCSS - Housing/Utilities     Has Housing: Yes     Worried About Losing Housing: No     Unable to Get Utilities: No                                Physical Exam    ED Triage Vitals [07/14/25 1153]   /72   Pulse 85   Resp 14   Temp 97.7 °F (36.5 °C)   Temp src Temporal   SpO2 96 %   O2 Device None (Room air)       Current Vitals:   Vital Signs  BP: 124/69  Pulse: 57  Resp: 16  Temp: 97.7 °F (36.5 °C)  Temp src: Temporal  MAP (mmHg): 85    Oxygen Therapy  SpO2: 100 %  O2 Device: None (Room air)            Physical Exam  HEENT : NCAT, EOMI, PEERL,   neck supple, no JVD, trachea midline, No LAD  Heart: S1S2 normal. No murmurs, regular rate and rhythm  Lungs: Clear to auscultation bilaterally  Abdomen: Soft suprapubic and left lower quadrant regions are tender nondistended normal active bowel sounds without rebound, guarding or masses noted  Back left CVA tenderness is noted  Extremity no clubbing, cyanosis or edema noted.  Full range of motion noted without tenderness  Neuro: No focal deficits noted    All measures to prevent infection transmission during my interaction with the patient were taken.  The patient was already wearing droplet mask on my arrival to the room.  Personal protective equipment including a droplet mask as well as gloves were worn throughout the duration of my exam.  Hand washing was performed prior to and after the exam.  Stethoscope and equipment used during my examination was cleaned with a super Sani cloth germicidal wipe following the exam.        ED Course  Labs Reviewed   URINALYSIS WITH CULTURE REFLEX - Abnormal; Notable for the following components:       Result Value    Clarity Urine Turbid (*)     Blood Urine 2+ (*)     Protein Urine 50 (*)     Leukocyte Esterase Urine 500 (*)     WBC Urine >50 (*)     RBC Urine >10 (*)     All other components within normal limits   COMP METABOLIC PANEL (14) - Abnormal; Notable for the following components:    BUN 7 (*)     All other components within normal limits   LACTIC ACID, PLASMA - Normal   CBC WITH DIFFERENTIAL WITH PLATELET   URINE CULTURE, ROUTINE   BLOOD CULTURE   BLOOD CULTURE       ED Course as of 07/14/25 1644  ------------------------------------------------------------  Time: 07/14 1641  Comment: The patient's CMP was normal.  Renal function was normal.  The urine is was consistent with a UTI.  The patient has a white count of 7.6 thousand.  Patient was given Toradol, Zofran and Dilaudid to help control his symptoms.  Rocephin was given as well.  Blood cultures x 2 were taken.   The patient has CT of the abdomen pelvis that I interpreted showing left-sided nephrostomy tube hydronephrosis..  The radiology report as well.  Comparison is shown that the hydronephrosis has improved.  I spoke with the hospitalist as well as placed the patient's urologist on consult.     CT ABDOMEN+PELVIS KIDNEYSTONE 2D RNDR(NO IV,NO ORAL)(CPT=74176)  Result Date: 7/14/2025  PROCEDURE: CT ABDOMEN+PELVIS KIDNEYSTONE 2D RNDR(NO IV,NO ORAL)(CPT=74176) INDICATIONS: nephrostomy, difficulty urinating, pain in abdomen, burning COMPARISON: CT abdomen and pelvis 6/25/2025 TECHNIQUE: Multi-slice CT images of the abdomen and pelvis were performed without intravenous contrast material. Automated exposure control techniques for dose reduction were used, adjustment of the mA and/or kV were based on patient's size. Use of iterative reconstruction technique for dose reduction was used. Dose information is transmitted to the ACR (American College of Radiology) NRDR (National Radiology Data Registry) which includes the Dose Index Registry. FINDINGS: KIDNEYS: There is a left percutaneous nephrostomy tube. There is a left ureteral stent. There are multiple nonobstructing calcifications in the left kidney. There is a 3 to 4 mm calcification within the left mid to proximal ureter adjacent to the stent which is seen for example series 3 image 103. Right kidney is unremarkable. URINARY BLADDER: Normal. No visible focal wall thickening, lesion, or calculus. LIVER: No enlargement, atrophy, abnormal density, or significant focal lesion. BILIARY: No visible dilatation or calcification. PANCREAS: No lesion, fluid collection, ductal dilatation, or atrophy. SPLEEN: No enlargement or focal lesion. ADRENALS: No mass or enlargement. AORTA/VASCULAR: Normal. No aneurysm or dissection. RETROPERITONEUM: No mass or enlarged adenopathy. BOWEL/MESENTERY: Normal. No visible mass, obstruction, or bowel wall thickening. ABDOMINAL WALL: Normal. No mass or  hernia. PELVIC NODES: Normal. No adenopathy. PELVIC ORGANS: Normal. No visible mass. Pelvic organs appropriate for patient age. BONES: There is bilateral spondylolysis at L5 without significant anterolisthesis. LUNG BASES: No visible pulmonary or pleural disease. OTHER: Negative.     CONCLUSION: 1. Interval placement left percutaneous nephrostomy tube with interval decrease in left hydronephrosis. 2. Left ureteral stent remains in position. There is a small ureteral calcification adjacent to the stent. 3. Multiple nonobstructing stones are noted in the left kidney. 4. Other incidental findings are noted above. Electronically Verified and Signed by Attending Radiologist: Param Carrillo MD 7/14/2025 4:03 PM Workstation: [id number]    IR NEPHROSTOMY TUBE  Result Date: 6/26/2025  PROCEDURE: IR NEPHROSTOMY TUBE INSERTION EXCHANGE CHECK INDICATIONS: LEFT   Hydro  and pain despite JJ stent PATIENT STATED HISTORY: COMPARISON: CT from 6/23/2025 TECHNIQUE:   TECHNIQUE: Prior to the procedure, I discussed with the patient and/or legal representative the potential benefits, risks, and side effects of this procedure, the likelihood of the patient achieving goals; and the potential problems that might occur during recuperation. I discussed reasonable alternatives to the procedure, including risks, benefits, steps to prevent infection and side effects related to the alternatives, and risks related to not receiving this procedure. A witnessed verbal and signed consent were obtained and documented in the patient's chart. IV was checked and maintained by the nurse. Moderate conscious sedation was performed under continuous pulse oximetry and cardiac monitoring under my direct supervision. The radiology nurse was in attendance throughout the exam. Moderate conscious sedation of 4 mg Versed and 200 mcg of Fentanyl was given. Patient was assessed and monitoring of oxygen saturation, heart rate, and blood pressure by the nursing  staff and myself during the exam for a total intraservice time of 11 minutes of conscious sedation time from 10:59 a.m. to 11:10 a.m. 500 mg of Levaquin were given pre-procedurally via existing IV. The patient was placed prone on the angiographic table. The patient's back and nephrostomy tube(s) were prepped and covered with a full body drape in the usual sterile fashion. All operators present for the case performed standard pre-procedural prep including hand washing, sterile gloves, gown, mask, and cap. All aspects of the maximum sterile barrier technique were followed. A preprocedural time out was performed with all physicians, technologists, and nurses involved with the procedure. Local anesthesia was obtained by 1% lidocaine. Under sonographic guidance using an Accustick system a left posterior inferior calyx was accessed. Urine returned through the needle. A small amount of contrast was injected demonstrating correct position of the needle. Then a wire was advanced into the left ureter under fluoroscopic guidance and the Accustick sheath was placed. A Hutson wire was passed through the sheath with its tip in the distal ureter under fluoroscopic guidance. Dilators were passed over the wire. Then a 8 Fr nephrostomy tube was advanced into the renal pelvis. Urine returned through the tube. A small amount of contrast was injected obtaining a nephrostogram. LEFT NEPHROSTOGRAM: There was moderate to severe hydronephrosis. The distal aspect of the left nephrostomy tube was coiled in the renal pelvis and locked and the external portion was placed to bag gravity drainage. The catheter was secured to the skin with non-absorbable suture. The patient tolerated the procedure well and had no complaints. There were no immediate complications. ESTIMATED BLOOD LOSS: Less than 5cc. CONTRAST: 15mL of Isovue-300 FLUOROSCOPY TIME: 2.3 minutes AIR KERMA: 14.39 mGy     CONCLUSION: Successful left nephrostomy tube placement without  complication (8 Tajik) Electronically Verified and Signed by Attending Radiologist: William Barragan MD 6/26/2025 11:56 AM Workstation: [id number]    CT ABDOMEN+PELVIS KIDNEYSTONE 2D RNDR(NO IV,NO ORAL)(CPT=74176)  Result Date: 6/25/2025  PROCEDURE: CT ABDOMEN+PELVIS KIDNEYSTONE 2D RNDR(NO IV,NO ORAL)(CPT=74176) INDICATIONS:  Recent left ureteral stent, increased flank pain PATIENT STATED HISTORY: Left flank pain, increased/painful urination, ureteral stent placed two days ago COMPARISON: CT of the abdomen pelvis from 6/23/2025 TECHNIQUE: Multi-slice CT images of the chest, abdomen, and pelvis were performed with intravenous contrast material. Automated exposure control techniques for dose reduction were used, adjustment of the mA and/or kV were based on patient's size. Use of iterative reconstruction technique for dose reduction was used. Dose information is transmitted to the ACR (American College of Radiology) NRDR (National Radiology Data Registry) which includes the Dose Index Registry. FINDINGS: ABDOMEN/PELVIS: LIVER: Homogeneous enhancement. There is no focal mass or enlargement. BILIARY: No biliary ductal dilatation. The gallbladder has a normal appearance.  PANCREAS: Homogeneous enhancement. No mass, enlargement or ductal dilatation. SPLEEN: No enlargement or focal lesion. KIDNEYS: There is been interval placement of a left ureteral stent. The proximal pigtail is located within the midpole minor calyx/infundibulum. There is a 3 mm nonobstructing calculus seen in the midpole of the left kidney. There are numerous calculi identified within the lower pole the left kidney measuring between 1 mm and 5 mm. Small 3 mm calculus is also seen in the upper pole of the left kidney. No definitive ureteral calculi identified. There is mild left hydronephrosis which has improved compared to 6/23/2025. Urothelial thickening of the renal pelvis and proximal to mid left ureter noted is noted which has mildly worsened. This  could reflect infection or inflammation. No hydroureter. No calculi are seen along the course of the ureteral stent in the ureter or bladder. There is a 6 mm hyperdense cyst seen within the mid to lower pole of the right kidney. ADRENALS: No mass or enlargement. AORTA/VASCULAR: No aneurysm or dissection. RETROPERITONEUM: No adenopathy or mass. BOWEL/MESENTERY: There is a moderate stool burden. There is a normal appendix. There is no evidence for bowel obstruction. There is a small amount of free pelvic fluid in the cul-de-sac which is slightly increased. No free air. ABDOMINAL WALL: No ventral wall hernia. URINARY BLADDER: Left ureteral stent distal pigtail is located in the lumen of the urinary bladder. There is a small dot of gas in the bladder consistent with recent stent placement no bladder calculi. No significant bladder wall thickening seen. PELVIC ORGANS: Prostate calcifications are noted. No significant enlargement BONES: No acute osseous abnormality identified.     CONCLUSION: 1. Compared to the CT from 6/23/2025 there is been interval placement of a left ureteral stent. There is interval increase in mucosal thickening involving the left renal pelvis and proximal to mid left ureter. Findings could reflect urinary tract infection or postprocedural change. The degree of hydronephrosis has improved with mild residual hydronephrosis present. 2. There are numerous nonobstructing calculi within the left kidney. 3. There is a 6 mm hyperdense cyst seen in the midpole of the right kidney. Electronically Verified and Signed by Attending Radiologist: Michael Cortes MD 6/25/2025 4:46 PM Workstation: [id number]    XR OR - N/C  Result Date: 6/23/2025  PROCEDURE:  XR OR - N/C  COMPARISON:  [facility], XR OR - N/C, 6/16/2025, 5:43 PM.  INDICATIONS:  Cystogram, retrograde pyelogram  TECHNIQUE:   FLUOROSCOPY IMAGES OBTAINED:  0 FLUOROSCOPY TIME:  40 seconds TECHNOLOGIST TIME:  30 minutes RADIATION DOSE (AIR KERMA  PRODUCT):  188.8uGy/m2   FINDINGS:             CONCLUSION:  Multiple fluoroscopic images demonstrate retrograde cannulation and opacification of the left collecting system.  Please refer to dedicated procedure report for full detail.   LOCATION:  [id number]    Dictated by (CST): Daniel Potter MD on 6/23/2025 at 7:17 PM     Finalized by (CST): Daniel Potter MD on 6/23/2025 at 7:17 PM       CT ABDOMEN+PELVIS(CONTRAST ONLY)(CPT=74177)  Result Date: 6/23/2025  PROCEDURE:  CT ABDOMEN+PELVIS (CONTRAST ONLY) (CPT=74177)  COMPARISON:  [facility] , CT, CT ABDOMEN+PELVIS(CPT=74176), 3/26/2025, 11:09 AM.  INDICATIONS:  hx of L ureteral stent removal one week prior  TECHNIQUE:  CT scanning was performed from the dome of the diaphragm to the pubic symphysis with non-ionic intravenous contrast material. Post contrast coronal MPR imaging was performed.  Dose reduction techniques were used. Dose information is transmitted to the ACR (American College of Radiology) NRDR (National Radiology Data Registry) which includes the Dose Index Registry.  PATIENT STATED HISTORY:(As transcribed by Technologist)  Nausea, vomiting, left back pain, left upper and lower quadrant pain. Recent left ureteral stent removed.   CONTRAST USED:  100cc of Isovue 370  FINDINGS:  LIVER:  No enlargement, atrophy, abnormal density, or significant focal lesion.  BILIARY:  No visible dilatation or calcification.  PANCREAS:  No lesion, fluid collection, ductal dilatation, or atrophy.  SPLEEN:  No enlargement or focal lesion.  KIDNEYS:  The right kidney is normal without suspicious mass or nephrolithiasis.  There is overall decrease in the calcifications noted within the left kidney especially decreased inferiorly with a few 2-3 mm calcifications persisting.  Again noted is moderate to severe hydronephrosis with only mild hydroureter.  There is extensive mucosal thickening involving the inferior left renal pelvis and proximal to mid left ureter there are adjacent surgical  clips along the anterior border of the psoas muscle on the left.  This mucosal enhancement may represent inflammatory change, infectious etiologies versus post treatment changes. ADRENALS:  No mass or enlargement.  AORTA/VASCULAR:  No aneurysm or dissection.  RETROPERITONEUM:  No mass or adenopathy.  BOWEL/MESENTERY:  No free air.  Trace amount of free pelvic fluid.  There are few scattered diverticula.  Moderate stool burden.  No bowel obstruction.  The stomach is empty.  The small bowel is unremarkable.  The appendix is normal. ABDOMINAL WALL:  No mass or hernia.  URINARY BLADDER:  There is mild to moderate bladder wall thickening.  No bladder calculus noted.  There is a single air bubble in the superior anterior aspect of the bladder which may represent recent instrumentation with other etiologies not excluded. PELVIC NODES:  No adenopathy.  PELVIC ORGANS:  Multiple central prostate attic calcifications noted.  The prostate is normal in size. BONES:  Again noted is bilateral L5 spondylolysis with slight anterior spondylolisthesis L5 on S1. LUNG BASES:  No visible pulmonary or pleural disease.  OTHER:  Negative.             CONCLUSION:  1. There is moderate to severe left-sided hydronephrosis with significant mucosal thickening involving the inferior left renal pelvis and proximal to mid left ureter either representing infectious etiologies, inflammatory changes or post treatment changes.  Overall decrease in the left-sided nephrolithiasis.  There is mild to moderate bladder wall thickening with a single air bubble which may be related to recent instrumentation with other etiologies not excluded.    LOCATION:  [place] [id number]   Dictated by (CST): Richard Lynch MD on 6/23/2025 at 7:56 AM     Finalized by (CST): Richard Lynch MD on 6/23/2025 at 8:07 AM       XR CHEST AP PORTABLE  (CPT=71045)  Result Date: 6/23/2025  PROCEDURE:  XR CHEST AP PORTABLE  (CPT=71045)  TECHNIQUE:  AP chest radiograph was obtained.   COMPARISON:  [facility] , XR, CHEST AP (1 VW), 4/11/2008, 10:04 AM.  INDICATIONS:  assess for free air, left-sided abdominal pain and flank pain  PATIENT STATED HISTORY: (As transcribed by Technologist)  Patient stated he has been vomiting and having left sided abdominal and flank pain since last night. States he had left ureter surgery one month ago.                CONCLUSION:  Normal examination.    LOCATION:  [id number]      Dictated by (CST): Richard Lynch MD on 6/23/2025 at 7:08 AM     Finalized by (CST): Richard Lynch MD on 6/23/2025 at 7:09 AM       XR OR - N/C  Result Date: 6/16/2025  PROCEDURE:  XR OR - N/C  COMPARISON:  [facility] , XR, XR OR - N/C, 3/27/2025, 7:04 PM.  INDICATIONS:  CYSTOSCOPY, LEFT RETROGRADE PYLEOGRAM, LEFT URETEROSCOPY, LASER LITHOTRIPSY, LEFT STENT EXCHANGE  TECHNIQUE:   FLUOROSCOPY IMAGES OBTAINED:  5 FLUOROSCOPY TIME:  23 sec TECHNOLOGIST TIME:  15 min RADIATION DOSE (AIR KERMA PRODUCT):  5mGy             CONCLUSION:  Five images obtained from retrograde evaluation on the left showing partially visualized collecting system left kidney hydronephrotic.  Images obtained for the purposes of urology procedure. If additional diagnostic imaging needed, consider followup with standard imaging exams.   LOCATION:  [id number]    Dictated by ([facility]): Master Saucedo MD on 6/16/2025 at 6:18 PM     Finalized by (CST): Master Saucedo MD on 6/16/2025 at 6:18 PM       Medications   sodium chloride 0.9% infusion (125 mL/hr Intravenous New Bag 7/14/25 1305)   HYDROmorphone (Dilaudid) 1 MG/ML injection 0.5 mg (0.5 mg Intravenous Given 7/14/25 1550)   ketorolac (Toradol) 15 MG/ML injection 15 mg (15 mg Intravenous Given 7/14/25 1258)   ondansetron (Zofran) 4 MG/2ML injection 4 mg (4 mg Intravenous Given 7/14/25 1302)   cefTRIAXone (Rocephin) 1 g in sodium chloride 0.9% 100 mL IVPB-ADDV (0 g Intravenous Stopped 7/14/25 1411)                       MDM     Differential diagnosis included ureteral stone,  hydronephrosis, pyelonephritis, UTI but not limited .  At this time the patient has a urinary tract infection with a nephrostomy tube that is in place with improved hydronephrosis.  At this time patient be admitted for further management with urology on consult.    This note was prepared using Dragon Medical voice recognition dictation software.  As a result errors may occur.  When identified to these areas have been corrected.  While every attempt is made to correct errors during dictation discrepancies may still exist.  Please contact if there are any errors.    Admission disposition: 7/14/2025  4:42 PM           Medical Decision Making      Disposition and Plan     Clinical Impression:  1. Acute cystitis with hematuria    2. Ureteral stent present         Disposition:  Admit  7/14/2025  4:42 pm    Follow-up:  No follow-up provider specified.        Medications Prescribed:  Current Discharge Medication List                Supplementary Documentation:         Hospital Problems       Present on Admission  Date Reviewed: 5/15/2025          ICD-10-CM Noted POA    * (Principal) Acute cystitis with hematuria N30.01 7/14/2025 Unknown

## 2025-07-14 NOTE — H&P
[facility]     Austin Mcfarland Patient Status:  Emergency    1990 MRN [id number]   Location [facility] EMERGENCY DEPARTMENT Attending Richard Lucas MD   Hosp Day # 0 PCP FRANK LOERA     Chief Complaint: abdominal pain,difficulty urinating.     Subjective:    History of Present Illness:     Austin Mcfarland is a 34 year old male with hx of kidney stones who previously was found to have hydronephrosis had a left-sided urostomy tube placement and a ureteral stent placed patient was sent home patient been doing well until about 3 3 this morning he started having pain with urination and was having some hesitancy with urinating.  Patient denies any hematuria in the nephrostomy tube or when he was urinating through his penis.  No fevers, chills, but did have some nausea and vomiting.  No chest pain, shortness of breath, palpitations.    History/Other:    Past Medical History:  Past Medical History[1]  Past Surgical History:   Past Surgical History[2]   Family History:   Family History[3]  Social History:    reports that he has never smoked. He has never used smokeless tobacco. He reports current alcohol use. He reports current drug use. Frequency: 7.00 times per week. Drug: Cannabis.     Allergies: Allergies[4]    Medications:  Medications Ordered Prior to Encounter[5]    Review of Systems:   A comprehensive review of systems was completed.    Pertinent positives and negatives noted in the HPI.    Objective:   Physical Exam:    /69   Pulse 57   Temp 97.7 °F (36.5 °C) (Temporal)   Resp 16   Ht 6' 1\" (1.854 m)   Wt 170 lb (77.1 kg)   SpO2 100%   BMI 22.43 kg/m²   General: No acute distress, Alert  Respiratory: No rhonchi, no wheezes  Cardiovascular: S1, S2. Regular rate and rhythm  Abdomen: Soft, Non-tender, non-distended, positive bowel sounds  Neuro: No new focal deficits  Extremities: No edema      Results:    Labs:      Labs Last 24 Hours:    Recent Labs   Lab  07/14/25  1255   RBC 4.90   HGB 15.0   HCT 42.7   MCV 87.1   MCH 30.6   MCHC 35.1   RDW 12.3   NEPRELIM 5.35   WBC 7.6   .0       Recent Labs   Lab 07/14/25  1255   GLU 95   BUN 7*   CREATSERUM 1.01   EGFRCR 101   CA 9.0   ALB 4.8      K 4.2      CO2 30.0   ALKPHO 89   AST 16   ALT 12   BILT 0.7   TP 7.8       Estimated Glomerular Filtration Rate: 101 mL/min/1.73m2 (result from lab).    Lab Results   Component Value Date    INR 1.13 06/26/2025       No results for input(s): \"TROP\", \"TROPHS\", \"CK\" in the last 168 hours.    No results for input(s): \"TROP\", \"PBNP\" in the last 168 hours.    No results for input(s): \"PCT\" in the last 168 hours.    Imaging: Imaging data reviewed in Epic.    Assessment & Plan:      # UTI  - Urostomy associated present on admission  - Continue ceftriaxone  - Urine cultures pending.    # History of hydronephrosis  - Urostomy tube was placed by urology  - Urology on consult  - Imaging shows improvement of the hydronephrosis.        Plan of care discussed with patient at bedside.    Manjit Wei, DO    Supplementary Documentation:     The 21st Century Cures Act makes medical notes like these available to patients in the interest of transparency. Please be advised this is a medical document. Medical documents are intended to carry relevant information, facts as evident, and the clinical opinion of the practitioner. The medical note is intended as peer to peer communication and may appear blunt or direct. It is written in medical language and may contain abbreviations or verbiage that are unfamiliar.                                     [1]   Past Medical History:   Anxiety    Calculus of kidney    Congenital obstruction of ureteropelvic junction    Depression    Renal disorder    congenital left ureter stricture    [2]   Past Surgical History:  Procedure Laterality Date    Cystoscopy,+ureteroscopy Left 07/31/2010    s/p left rpg, left urs, left renoscopy, cysto,  with stent placement 7-31-10 at [facility], Dr John Sheffield    Cystoscopy,+ureteroscopy Left 12/03/2010    L URS, Dr. Sheffield    Cystoscopy,+ureteroscopy Left     Cysto, URS, RPG, stent Bladder Stone removal-Dr Wilson    Cystoscopy,+ureteroscopy Left 03/19/2015    no stent placed, [facility]    Cystoscopy,insert ureteral stent  04/22/2014    Procedure: LITHOTRIPSY WITH CYSTOSCOPY, STENT PLACEMENT;  Surgeon: Dex Jane DO;  Location: [facility]    Cystoscopy,remv calculus,simple  12/27/2007    Performed by JOHN SHEFFIELD at [facility]    Cystoscopy,remv calculus,simple  05/23/2008    Performed by JOHN SHEFFIELD at [facility]    Cystourethroscopy Left 12/27/2010    cysto stent removal- Dr. Sheffield    Cystourethroscopy Left 12/19/2014    Cysto Stent Removal-Dr Jane    Cystourethroscopy,ureter catheter  03/10/2008    Performed by JOHN SHEFFIELD at [facility]    Cystourethroscopy,ureter catheter  04/22/2014    Procedure: LITHOTRIPSY WITH CYSTOSCOPY, STENT PLACEMENT;  Surgeon: Dex Jane DO;  Location: [facility]    Fragmenting of kidney stone  09/25/2007    Performed by JOHN SHEFFIELD at [facility]    Fragmenting of kidney stone  09/25/2007    Performed by JOHN SHEFFIELD at [facility]    Fragmenting of kidney stone  04/22/2014    Procedure: LITHOTRIPSY WITH CYSTOSCOPY, STENT PLACEMENT;  Surgeon: Dex Jane DO;  Location: [facility]    Ir nephhrostomy tube  2007    Cysto stent removal, nephrostomy tube placement    Laparoscopy, surgical; pyeloplasty  10/11/2007    Left UPJ repair    Lithotripsy Left 06/21/2007    Left ESWL    Other surgical history  10/08/2016    Cysto, Lt RPG, Lt URS & Nephroscopy, Stone Manipulation, Stone Basket & Removal, Stent Placement-Dr. Sheffield     Other surgical history      left pcnl cdh    Other surgical history      stent placement cdh    Other surgical history   12/05/2016    cysto stent removal dr calderon    Other surgical history  12/11/2020    Cysto/stent removal Dr. Su    Other surgical history  06/25/2021    CYSTOSCOPY, LEFT DIAGNOSTIC URETEROSCOPY, LEFT RETROGRADE PYELOGRAM, LEFT URETERAL STENT PLACEMENT,    Other surgical history  06/11/2021    CYSTOSCOPY,LEFT URETEROSCOPY,, RETROGRADE PYELOGRAM, LEFT STENT INSERTION    Other surgical history  01/21/2022    Cysto, Lt URS, RPHG, LL, Stone Extraction, Lt Stent Placement - Dr. Steel    Other surgical history  02/03/2022    Cysto Stent Removal- Dr. Steel    Renal endoscopy  03/10/2008    Performed by DEBORAH CALDERON at [facility]    Special service or report  05/09/2007    Cysto, stone extraction    Special service or report  09/20/2006    Neph tube insertion with endopyelotomy    Special service or report  10/21/2007    Larger ureteral stent placed    Urology surgery procedure unlisted  05/23/2008    Performed by DEBORAH CALDERON at [facility]    X-ray antegrade pyelogram tube  03/10/2008    Performed by DEBORAH CALDERON at [facility]    X-ray retrograde pyelogram  03/10/2008    Performed by DEBORAH CALDERON at [facility]    X-ray retrograde pyelogram  05/23/2008    Performed by DEBORAH CALDERON at [facility]    X-ray retrograde pyelogram  04/22/2014    Procedure: LITHOTRIPSY WITH CYSTOSCOPY, STENT PLACEMENT;  Surgeon: Dex Jane DO;  Location: [facility]   [3] No family history on file.  [4] No Known Allergies  [5]   Current Facility-Administered Medications on File Prior to Encounter   Medication Dose Route Frequency Provider Last Rate Last Admin    [COMPLETED] lidocaine (Xylocaine) 1 % injection             [COMPLETED] fentaNYL (Sublimaze) 50 mcg/mL injection             [COMPLETED] midazolam (Versed) 2 MG/2ML injection             [COMPLETED] fentaNYL (Sublimaze) 50 mcg/mL injection             [COMPLETED] midazolam (Versed) 2  MG/2ML injection             [COMPLETED] iopamidol (ISOVUE-300) 61 % injection 30 mL  30 mL Intravascular ONCE PRN William Barragan MD   15 mL at 06/26/25 1118    [COMPLETED] sodium chloride 0.9 % IV bolus 1,000 mL  1,000 mL Intravenous Once Harrison Bowens MD   Stopped at 06/25/25 1438    [COMPLETED] ondansetron (Zofran) 4 MG/2ML injection 4 mg  4 mg Intravenous Once Harrison Bowens MD   4 mg at 06/25/25 1338    [COMPLETED] morphINE PF 4 MG/ML injection 4 mg  4 mg Intravenous Once Harrison Bowens MD   4 mg at 06/25/25 1341    [COMPLETED] ketorolac (Toradol) 30 MG/ML injection 30 mg  30 mg Intravenous Once Harrison Bowens MD   30 mg at 06/25/25 1345    [COMPLETED] morphINE PF 4 MG/ML injection 4 mg  4 mg Intravenous Once Harrison Bowens MD   4 mg at 06/25/25 1457    [COMPLETED] lidocaine (Xylocaine) 1 % 116 mg in sodium chloride 0.9% 100 mL IVPB  1.5 mg/kg Intravenous Once Harrison Bowens MD   Stopped at 06/25/25 1646    [COMPLETED] metoclopramide (Reglan) 5 mg/mL injection 5 mg  5 mg Intravenous Once Harrison Bowens MD   5 mg at 06/25/25 1714    [COMPLETED] ondansetron (Zofran) 4 MG/2ML injection 4 mg  4 mg Intravenous Once Roula Donato MD   4 mg at 06/23/25 0640    [COMPLETED] sodium chloride 0.9 % IV bolus 1,000 mL  1,000 mL Intravenous Once Roula Donato MD   Stopped at 06/23/25 0750    [COMPLETED] HYDROmorphone (Dilaudid) 1 MG/ML injection 0.5 mg  0.5 mg Intravenous Once Roula Donato MD   0.5 mg at 06/23/25 0645    [COMPLETED] iopamidol 76% (ISOVUE-370) injection for power injector  100 mL Intravenous ONCE PRN Roula Donato MD   100 mL at 06/23/25 0726    [COMPLETED] ketorolac (Toradol) 15 MG/ML injection 15 mg  15 mg Intravenous Once Roula Donato MD   15 mg at 06/23/25 0748    [COMPLETED] HYDROmorphone (Dilaudid) 1 MG/ML injection 1 mg  1 mg Intravenous Once Roula Donato MD   1 mg at 06/23/25 0910    [COMPLETED] HYDROmorphone (Dilaudid) 1 MG/ML injection 1 mg  1 mg Intravenous Once Roula Donato MD    1 mg at 25 1137    [COMPLETED] ceFAZolin (Ancef) 2g in 10mL IV syringe premix  2 g Intravenous On Call to OR Monse Caldwell PA   2 g at 25 1830    [COMPLETED] ketorolac (Toradol) 15 MG/ML injection 15 mg  15 mg Intravenous Once Serjio Perdomo MD   15 mg at 25 1409    [COMPLETED] sodium chloride 0.9 % IV bolus 500 mL  500 mL Intravenous Once Serjio Perdomo MD   Stopped at 25 1550    [COMPLETED] HYDROmorphone (Dilaudid) 1 MG/ML injection 1 mg  1 mg Intravenous Once Serjio Perdomo MD   1 mg at 25 1409    [COMPLETED] HYDROmorphone (Dilaudid) 1 MG/ML injection 1 mg  1 mg Intravenous Once Serjio Perdomo MD   1 mg at 25 1548    [COMPLETED] heparin (Porcine) 5000 UNIT/ML injection 5,000 Units  5,000 Units Subcutaneous Once Charlene Johnson MD   5,000 Units at 25 1311    [COMPLETED] ceFAZolin (Ancef) 2g in 10mL IV syringe premix  2 g Intravenous Once Charlene Johnson MD   2 g at 25 1328    [COMPLETED] ceFAZolin (Ancef) 2g in 10mL IV syringe premix  2 g Intravenous Q8H Charlene Johnson MD   Stopped at 25 0706    [COMPLETED] ondansetron (Zofran) 4 MG/2ML injection             [COMPLETED] HYDROmorphone (Dilaudid) 1 MG/ML injection              Current Outpatient Medications on File Prior to Encounter   Medication Sig Dispense Refill    [] cephALEXin 500 MG Oral Cap Take 1 capsule (500 mg total) by mouth 2 (two) times daily.      oxybutynin 5 MG Oral Tab Take 1 tablet (5 mg total) by mouth 4 (four) times daily as needed (bladder spasms). 30 tablet 0    HYDROcodone-acetaminophen 5-325 MG Oral Tab Take 1-2 tablets by mouth every 4 (four) hours as needed for Pain. 6 tablet 0    Naloxone HCl 4 MG/0.1ML Nasal Liquid 4 mg by Nasal route as needed. If patient remains unresponsive, repeat dose in other nostril 2-5 minutes after first dose. 1 kit 0    acetaminophen 500 MG Oral Tab Take 1-2 tablets (500-1,000 mg total) by mouth every 4 (four) hours as needed for  Pain.

## 2025-07-14 NOTE — ED INITIAL ASSESSMENT (HPI)
Pt c/o vomiting since 0800 this morning, took ODT zofran with mild relief, also c/o burning with urination since 0300.  Pt has nephrostomy tube, draining urine.

## 2025-07-14 NOTE — ED QUICK NOTES
Orders for admission, patient is aware of plan and ready to go upstairs. Any questions, please call ED RN Sammie at extension [phone].     Patient Covid vaccination status: Fully vaccinated     COVID Test Ordered in ED: None    COVID Suspicion at Admission: N/A    Running Infusions: Medication Infusions[1]     Mental Status/LOC at time of transport: A/Ox4    Other pertinent information:   CIWA score: N/A   NIH score:  N/A             [1]    sodium chloride 125 mL/hr (07/14/25 1305)

## 2025-07-15 LAB
ANION GAP SERPL CALC-SCNC: 6 MMOL/L (ref 0–18)
BASOPHILS # BLD AUTO: 0.03 X10(3) UL (ref 0–0.2)
BASOPHILS NFR BLD AUTO: 0.4 %
BUN BLD-MCNC: 7 MG/DL (ref 9–23)
CALCIUM BLD-MCNC: 8.4 MG/DL (ref 8.7–10.6)
CHLORIDE SERPL-SCNC: 104 MMOL/L (ref 98–112)
CO2 SERPL-SCNC: 31 MMOL/L (ref 21–32)
CREAT BLD-MCNC: 0.91 MG/DL (ref 0.7–1.3)
EGFRCR SERPLBLD CKD-EPI 2021: 114 ML/MIN/1.73M2 (ref 60–?)
EOSINOPHIL # BLD AUTO: 0.17 X10(3) UL (ref 0–0.7)
EOSINOPHIL NFR BLD AUTO: 2.5 %
ERYTHROCYTE [DISTWIDTH] IN BLOOD BY AUTOMATED COUNT: 12.3 %
GLUCOSE BLD-MCNC: 93 MG/DL (ref 70–99)
HCT VFR BLD AUTO: 38.8 % (ref 39–53)
HGB BLD-MCNC: 13.4 G/DL (ref 13–17.5)
IMM GRANULOCYTES # BLD AUTO: 0.01 X10(3) UL (ref 0–1)
IMM GRANULOCYTES NFR BLD: 0.1 %
LYMPHOCYTES # BLD AUTO: 1.4 X10(3) UL (ref 1–4)
LYMPHOCYTES NFR BLD AUTO: 20.3 %
MCH RBC QN AUTO: 31.2 PG (ref 26–34)
MCHC RBC AUTO-ENTMCNC: 34.5 G/DL (ref 31–37)
MCV RBC AUTO: 90.2 FL (ref 80–100)
MONOCYTES # BLD AUTO: 0.62 X10(3) UL (ref 0.1–1)
MONOCYTES NFR BLD AUTO: 9 %
NEUTROPHILS # BLD AUTO: 4.67 X10 (3) UL (ref 1.5–7.7)
NEUTROPHILS # BLD AUTO: 4.67 X10(3) UL (ref 1.5–7.7)
NEUTROPHILS NFR BLD AUTO: 67.7 %
OSMOLALITY SERPL CALC.SUM OF ELEC: 290 MOSM/KG (ref 275–295)
PLATELET # BLD AUTO: 188 10(3)UL (ref 150–450)
POTASSIUM SERPL-SCNC: 4.2 MMOL/L (ref 3.5–5.1)
RBC # BLD AUTO: 4.3 X10(6)UL (ref 4.3–5.7)
SODIUM SERPL-SCNC: 141 MMOL/L (ref 136–145)
WBC # BLD AUTO: 6.9 X10(3) UL (ref 4–11)

## 2025-07-15 PROCEDURE — 99232 SBSQ HOSP IP/OBS MODERATE 35: CPT | Performed by: HOSPITALIST

## 2025-07-15 RX ORDER — PHENAZOPYRIDINE HYDROCHLORIDE 100 MG/1
200 TABLET, FILM COATED ORAL 3 TIMES DAILY PRN
Status: DISCONTINUED | OUTPATIENT
Start: 2025-07-15 | End: 2025-07-16

## 2025-07-15 NOTE — PLAN OF CARE
Pt from home  Aox4  VSS on RA  afebrile  No tele, receiving Lovenox  Electrolyte non-cardiac replacement  Continent, left nephrostomy is in place  Up self, call light within reach  Regular diet  Receiving Rocephin and 0.9% NS @125  Pt/family updated on current POC, no questions at this time    Problem: GENITOURINARY - ADULT  Goal: Absence of urinary retention  Description: INTERVENTIONS:  - Assess patient’s ability to void and empty bladder  - Monitor intake/output and perform bladder scan as needed  - Follow urinary retention protocol/standard of care  - Consider collaborating with pharmacy to review patient's medication profile  - Implement strategies to promote bladder emptying  Outcome: Progressing     Problem: Patient/Family Goals  Goal: Patient/Family Long Term Goal  Description: Patient's Long Term Goal: DC    Interventions:  - Uro consult  - See additional Care Plan goals for specific interventions  Outcome: Progressing  Goal: Patient/Family Short Term Goal  Description: Patient's Short Term Goal: 7/14 noc- sleep/pain manaegment    Interventions:   - PRN meds  - Cluster care  - See additional Care Plan goals for specific interventions  Outcome: Progressing     Problem: PAIN - ADULT  Goal: Verbalizes/displays adequate comfort level or patient's stated pain goal  Description: INTERVENTIONS:  - Encourage pt to monitor pain and request assistance  - Assess pain using appropriate pain scale  - Administer analgesics based on type and severity of pain and evaluate response  - Implement non-pharmacological measures as appropriate and evaluate response  - Consider cultural and social influences on pain and pain management  - Manage/alleviate anxiety  - Utilize distraction and/or relaxation techniques  - Monitor for opioid side effects  - Notify MD/LIP if interventions unsuccessful or patient reports new pain  - Anticipate increased pain with activity and pre-medicate as appropriate  Outcome: Progressing

## 2025-07-15 NOTE — CONSULTS
[facility]  Department of Urology   Consultation Note    Austin Mcfarland Patient Status:  Observation    1990 MRN [id number]   Location [facility] 5NW-A Attending Rajiv Tomas MD   Hosp Day # 0 PCP FRANK LOERA     Reason for Consultation:  Left flank pain    History of Present Illness:  Austin Mcfarland is a a(n) 34 year old male.     Patient has a longstanding history of nephrolithiasis recurrent left ureteral stricture disease s/p prior buccal mucosal graft ureteroplasty, in the past as well as a more recent robotic ureterolysis with Dr. Johnson in May 2025.     Encrusted left ureteral stent   S/P cystoscopy, left ureteral stent removal, left retrograde ureteropyelogram 25 with Dr. Johnson.       Recent admission ion  for flank pain and left hydronephrosis. He was found to have moderate/severe left hydronephrosis and mucosal thickening.  He underwent cystoscopy, left retrograde pyelogram, and left ureteral stent placement with Dr. Toth on .      Operating findings suggested contrast did not drain well past the UPJ.  Stent was placed.     Admitted to [facility] 25 with left flank pain.      Underwent left NT 25 with IR  Discharged john paul on 25    Presented with dysuria, left flank pain - symptoms started yesterday  +urinary frequency  +nausea, vomiting  No fever or chills  Small amount of hematuria    Labs:  Lactic acid 1.1  WBC 7.6 > 6.9  Hgb 15 > 13.4  Platelet count 211 > 188  Serum creat 1.01 > 0.91    UA 25: >50 WBC/hpf, >10 RBC/hpf, no bacteria  Urine culture 25: pending    2/2 blood cultures 25: pending    Abdominal/pelvic CT scan without contrast 25:  There is a left percutaneous nephrostomy tube with interval decrease in left hydronephrosis. . There is a left ureteral stent. There are multiple nonobstructing calcifications in the left kidney. There is a 3 to 4 mm calcification within the left mid to  proximal ureter adjacent to the stent.  Right kidney is unremarkable.   URINARY BLADDER: Normal. No visible focal wall thickening, lesion, or calculus.     Urology was consulted.       History:  Past Medical History[1]  Past Surgical History[2]  Family History[3]   reports that he has never smoked. He has never used smokeless tobacco. He reports current alcohol use. He reports current drug use. Frequency: 7.00 times per week. Drug: Cannabis.    Allergies:  Allergies[4]    Medications:  Current Hospital Medications[5]    Review of Systems:  Pertinent items are noted in HPI.  10 point review of systems completed.    Physical Exam:  /73 (BP Location: Left arm)   Pulse 63   Temp 98.1 °F (36.7 °C) (Oral)   Resp 16   Ht 6' 1\" (1.854 m)   Wt 169 lb (76.7 kg)   SpO2 99%   BMI 22.30 kg/m²   GENERAL: the patient is resting in bed in no acute distress.    HEENT: Unremarkable.  NECK: Supple.    LUNGS: non-labored respirations.    ABDOMEN: The abdomen is soft and nontender without rebound or guarding.      BACK: Without CVA tenderness.  Left NT in place draining yellow urine.    SKIN: Without stigmata.   NEUROLOGIC: Grossly intact.  EXTREMITIES: Without edema.      Laboratory Data:  Lab Results   Component Value Date    WBC 6.9 07/15/2025    HGB 13.4 07/15/2025    HCT 38.8 07/15/2025    .0 07/15/2025    CREATSERUM 0.91 07/15/2025    BUN 7 07/15/2025     07/15/2025    K 4.2 07/15/2025     07/15/2025    CO2 31.0 07/15/2025    GLU 93 07/15/2025    CA 8.4 07/15/2025    ALB 4.8 07/14/2025    ALKPHO 89 07/14/2025    BILT 0.7 07/14/2025    TP 7.8 07/14/2025    AST 16 07/14/2025    ALT 12 07/14/2025       Lactic acid 1.1  WBC 7.6 > 6.9  Hgb 15 > 13.4  Platelet count 211 > 188  Serum creat 1.01 > 0.91    UA 7/14/25: >50 WBC/hpf, >10 RBC/hpf, no bacteria  Urine culture 7/14/25: pending    2/2 blood cultures 7/14/25: pending    Imaging:  CT ABDOMEN+PELVIS KIDNEYSTONE 2D RNDR(NO IV,NO ORAL)(CPT=74176)  Result  Date: 7/14/2025  CONCLUSION: 1. Interval placement left percutaneous nephrostomy tube with interval decrease in left hydronephrosis. 2. Left ureteral stent remains in position. There is a small ureteral calcification adjacent to the stent. 3. Multiple nonobstructing stones are noted in the left kidney. 4. Other incidental findings are noted above. Electronically Verified and Signed by Attending Radiologist: Param Carrillo MD 7/14/2025 4:03 PM Workstation: [id number]     Impression:  Problem List[6]    HISTORY OF RECURRENT UROLITHIASIS AND LEFT URETERAL STRICTURE  S/P prior buccal mucosal graft ureteroplasty, and more recent left ureterolysis with Dr. Johnson in May, 2025   S/P cystoscopy, left ureteral stent placement 6/23/25 with Dr. Toth  S/P left NT 6/26/25 with IR    LEFT FLANK PAIN, DYSURIA, URINARY FREQUENCY, UTI  Lactic acid 1.1  WBC 7.6 > 6.9  Hgb 15 > 13.4  Platelet count 211 > 188  Serum creat 1.01 > 0.91  UA 7/14/25: >50 WBC/hpf, >10 RBC/hpf, no bacteria  Urine culture 7/14/25: pending  2/2 blood cultures 7/14/25: pending  Abdominal/pelvic CT scan without contrast 7/14/25: There is a left percutaneous nephrostomy tube with interval decrease in left hydronephrosis.  There is a left ureteral stent. There are multiple nonobstructing calcifications in the left kidney. There is a 3 to 4 mm calcification within the left mid to proximal ureter adjacent to the stent.  Right kidney is unremarkable.   URINARY BLADDER: Normal. No visible focal wall thickening, lesion, or calculus.       Recommendations:  -Dr. Jane reviewed CT scan - does not think he has stone within the ureter.    -No acute urological intervention required  -Check final cultures  -Abx per attending  -Follow labs, temp, UOP  -Pain management   -Pyridium prn  -Supportive care  -Continue present management with left NT  -Consideration for stent removal prior to discharge pending clinical course.    -Second opinion at [facility]  after discharge    Above discussed with patient, nurse, Dr. Dex Jane.  Will update Dr. Johnson.    Thank you for allowing me to participate in the care of your patient.    Monse Caldwell PA-C  [facility]  Department of Urology  7/15/2025  7:31 AM         [1]   Past Medical History:   Anxiety    Calculus of kidney    Congenital obstruction of ureteropelvic junction    Depression    Renal disorder    congenital left ureter stricture    [2]   Past Surgical History:  Procedure Laterality Date    Cystoscopy,+ureteroscopy Left 07/31/2010    s/p left rpg, left urs, left renoscopy, cysto, with stent placement 7-31-10 at [facility], Dr John Sheffield    Cystoscopy,+ureteroscopy Left 12/03/2010    L URS, Dr. Sheffield    Cystoscopy,+ureteroscopy Left     Cysto, URS, RPG, stent Bladder Stone removal-Dr Wilson    Cystoscopy,+ureteroscopy Left 03/19/2015    no stent placed, [facility] DIONY    Cystoscopy,insert ureteral stent  04/22/2014    Procedure: LITHOTRIPSY WITH CYSTOSCOPY, STENT PLACEMENT;  Surgeon: Dex Jane DO;  Location: [facility]    Cystoscopy,remv calculus,simple  12/27/2007    Performed by JOHN SHEFFIELD at [facility]    Cystoscopy,remv calculus,simple  05/23/2008    Performed by JOHN SHEFFIELD at [facility]    Cystourethroscopy Left 12/27/2010    cysto stent removal- Dr. Sheffield    Cystourethroscopy Left 12/19/2014    Cysto Stent Removal-Dr Jane    Cystourethroscopy,ureter catheter  03/10/2008    Performed by JOHN SHEFFIELD at [facility]    Cystourethroscopy,ureter catheter  04/22/2014    Procedure: LITHOTRIPSY WITH CYSTOSCOPY, STENT PLACEMENT;  Surgeon: Dex Jane DO;  Location: [facility]    Fragmenting of kidney stone  09/25/2007    Performed by JOHN SHEFFIELD at [facility]    Fragmenting of kidney stone  09/25/2007    Performed by JOHN SHFEFIELD at [facility]    Fragmenting of kidney stone   04/22/2014    Procedure: LITHOTRIPSY WITH CYSTOSCOPY, STENT PLACEMENT;  Surgeon: Dex Jane DO;  Location: [facility]    Ir nephhrostomy tube  2007    Cysto stent removal, nephrostomy tube placement    Laparoscopy, surgical; pyeloplasty  10/11/2007    Left UPJ repair    Lithotripsy Left 06/21/2007    Left ESWL    Other surgical history  10/08/2016    Cysto, Lt RPG, Lt URS & Nephroscopy, Stone Manipulation, Stone Basket & Removal, Stent Placement-Dr. Calderon     Other surgical history      left pcnl cdh    Other surgical history      stent placement cdh    Other surgical history  12/05/2016    cysto stent removal dr calderon    Other surgical history  12/11/2020    Cysto/stent removal Dr. Su    Other surgical history  06/25/2021    CYSTOSCOPY, LEFT DIAGNOSTIC URETEROSCOPY, LEFT RETROGRADE PYELOGRAM, LEFT URETERAL STENT PLACEMENT,    Other surgical history  06/11/2021    CYSTOSCOPY,LEFT URETEROSCOPY,, RETROGRADE PYELOGRAM, LEFT STENT INSERTION    Other surgical history  01/21/2022    Cysto, Lt URS, RPHG, LL, Stone Extraction, Lt Stent Placement - Dr. Steel    Other surgical history  02/03/2022    Cysto Stent Removal- Dr. Steel    Renal endoscopy  03/10/2008    Performed by DEBORAH CALDERON at [facility]    Special service or report  05/09/2007    Cysto, stone extraction    Special service or report  09/20/2006    Neph tube insertion with endopyelotomy    Special service or report  10/21/2007    Larger ureteral stent placed    Urology surgery procedure unlisted  05/23/2008    Performed by DEBORAH CALDERON at [facility]    X-ray antegrade pyelogram tube  03/10/2008    Performed by DEBORAH CALDERON at [facility]    X-ray retrograde pyelogram  03/10/2008    Performed by DEBORAH CALDERON at [facility]    X-ray retrograde pyelogram  05/23/2008    Performed by DEBORAH CALDERON at [facility]    X-ray retrograde pyelogram  04/22/2014    Procedure:  LITHOTRIPSY WITH CYSTOSCOPY, STENT PLACEMENT;  Surgeon: Dex Jane DO;  Location: [facility]   [3] No family history on file.  [4] No Known Allergies  [5]   Current Facility-Administered Medications:     sodium chloride 0.9% infusion, 125 mL/hr, Intravenous, Continuous    oxybutynin (Ditropan) tab 5 mg, 5 mg, Oral, QID PRN    melatonin tab 3 mg, 3 mg, Oral, Nightly PRN    sodium chloride 0.9% infusion, , Intravenous, Continuous    enoxaparin (Lovenox) 40 MG/0.4ML SUBQ injection 40 mg, 40 mg, Subcutaneous, Daily    acetaminophen (Tylenol Extra Strength) tab 500 mg, 500 mg, Oral, Q4H PRN    morphINE PF 2 MG/ML injection 1 mg, 1 mg, Intravenous, Q2H PRN **OR** morphINE PF 2 MG/ML injection 2 mg, 2 mg, Intravenous, Q2H PRN **OR** morphINE PF 4 MG/ML injection 4 mg, 4 mg, Intravenous, Q2H PRN    polyethylene glycol (PEG 3350) (Miralax) 17 g oral packet 17 g, 17 g, Oral, Daily PRN    sennosides (Senokot) tab 17.2 mg, 17.2 mg, Oral, Nightly PRN    bisacodyl (Dulcolax) 10 MG rectal suppository 10 mg, 10 mg, Rectal, Daily PRN    fleet enema (Fleet) rectal enema 133 mL, 1 enema, Rectal, Once PRN    ondansetron (Zofran) 4 MG/2ML injection 4 mg, 4 mg, Intravenous, Q6H PRN    prochlorperazine (Compazine) 10 MG/2ML injection 5 mg, 5 mg, Intravenous, Q8H PRN    cefTRIAXone (Rocephin) 1 g in sodium chloride 0.9% 100 mL IVPB-ADDV, 1 g, Intravenous, Q24H    ketorolac (Toradol) 15 MG/ML injection 15 mg, 15 mg, Intravenous, Q6H PRN **OR** ketorolac (Toradol) 30 MG/ML injection 30 mg, 30 mg, Intravenous, Q6H PRN  [6]   Patient Active Problem List  Diagnosis    Ureteropelvic junction obstruct, congenital    Recurrent kidney stones    Hydronephrosis of left kidney    Flank pain    Hydronephrosis, unspecified hydronephrosis type    Retroperitoneal fibrosis    Ureteral stent retained    Left flank pain    Ureteral colic    Ureteral stricture, left    Other chronic pain    Congenital obstruction of ureteropelvic  junction    Recurrent nephrolithiasis    Acute cystitis with hematuria    Ureteral stent present

## 2025-07-15 NOTE — PROGRESS NOTES
[facility]   part of [facility]     Hospitalist Progress Note     Austin Mcfarland Patient Status:  Observation    1990 MRN [id number]   Location [facility] 5NW-A Attending Rajiv Tomas MD   Hosp Day # 0 PCP FRANK LOERA     Subjective:   Pain improving     Objective:    Review of Systems:   A comprehensive review of systems was completed; pertinent positive and negatives stated in subjective.  Vital signs:  Temp:  [97.7 °F (36.5 °C)-97.8 °F (36.6 °C)] 97.8 °F (36.6 °C)  Pulse:  [] 68  Resp:  [14-18] 18  BP: (108-127)/(66-86) 115/86  SpO2:  [96 %-100 %] 98 %  Physical Exam:    General: No acute distress    Respiratory: no wheezes, no rhonchi  Cardiovascular: S1, S2, RRR  Abdomen: Soft, NT/ND, +BS  Extremities: no edema    Diagnostic Data:    Labs:  Recent Labs   Lab 25  1255 07/15/25  0621   WBC 7.6 6.9   HGB 15.0 13.4   MCV 87.1 90.2   .0 188.0     Recent Labs   Lab 25  1255 07/15/25  0621   GLU 95 93   BUN 7* 7*   CREATSERUM 1.01 0.91   CA 9.0 8.4*   ALB 4.8  --     141   K 4.2 4.2    104   CO2 30.0 31.0   ALKPHO 89  --    AST 16  --    ALT 12  --    BILT 0.7  --    TP 7.8  --      Estimated Creatinine Clearance: 125.3 mL/min (based on SCr of 0.91 mg/dL).  No results for input(s): \"PTP\", \"INR\" in the last 168 hours.     Microbiology  No results found for this visit on 25.  Imaging: Reviewed in Epic.  Medications: Scheduled Medications[1]    Assessment & Plan:    # UTI- Urostomy associated POA  - Continue ceftriaxone and f/u Cx      # Hydronephrosis- improvement   - Urostomy tube was placed by urology  - Urology on consult         Supplementary Documentation:   Quality:  DVT Mechanical Prophylaxis:   SCDs,    DVT Pharmacologic Prophylaxis   Medication    enoxaparin (Lovenox) 40 MG/0.4ML SUBQ injection 40 mg                Code Status: Not on file  Michaels: No urinary catheter in place  Michaels Duration (in days):   Central line:    BRANDIE:   At this point  Mr. Mcfarland is expected to be discharge to: home     The 21st Century Cures Act makes medical notes like these available to patients in the interest of transparency. Please be advised this is a medical document. Medical documents are intended to carry relevant information, facts as evident, and the clinical opinion of the practitioner. The medical note is intended as peer to peer communication and may appear blunt or direct. It is written in medical language and may contain abbreviations or verbiage that are unfamiliar.                [1]    enoxaparin  40 mg Subcutaneous Daily    cefTRIAXone  1 g Intravenous Q24H

## 2025-07-15 NOTE — PLAN OF CARE
Patient AAOx4. Saturating WNL on room air. Reports left flank pain, PRN pain medication per MAR with improvement. IV antibiotics. IVF infusing. Afebrile. Reports burning with urination, PRN pyridium given per MAR. Left nephrostomy draining, see flowsheets. Intermittent nausea, PRN zofran per MAR. Continent. Up ad adenike. Call light within reach. Patient updated on POC, questions answered, understanding verbalized.    Problem: Patient/Family Goals  Goal: Patient/Family Long Term Goal  Description: Patient's Long Term Goal: DC home with adequate resources    Interventions:  - Urology consult  -antibiotics as ordered  -pain management  - See additional Care Plan goals for specific interventions  Outcome: Progressing  Goal: Patient/Family Short Term Goal  Description: Patient's Short Term Goal:   7/15: adequate pain control    Interventions:   - PRN meds  -reassess pain  - See additional Care Plan goals for specific interventions  Outcome: Progressing     Problem: PAIN - ADULT  Goal: Verbalizes/displays adequate comfort level or patient's stated pain goal  Description: INTERVENTIONS:  - Encourage pt to monitor pain and request assistance  - Assess pain using appropriate pain scale  - Administer analgesics based on type and severity of pain and evaluate response  - Implement non-pharmacological measures as appropriate and evaluate response  - Consider cultural and social influences on pain and pain management  - Manage/alleviate anxiety  - Utilize distraction and/or relaxation techniques  - Monitor for opioid side effects  - Notify MD/LIP if interventions unsuccessful or patient reports new pain  - Anticipate increased pain with activity and pre-medicate as appropriate  Outcome: Progressing

## 2025-07-15 NOTE — PROGRESS NOTES
07/14/25 1938   Provider Notification   Reason for Communication New consult   Provider Name Sidney Ghosh MD   Method of Communication Page   Response Per protocol;No new orders         NURSING ADMISSION NOTE      Patient admitted via Cart  Oriented to room.  Safety precautions initiated.  Bed in low position.  Call light in reach.    Urology consulted, will see in am. Per Uro, no surgery anticipated. Morphine given d/t pain upon admission. Pt resting comfortably. IVF running.

## 2025-07-16 VITALS
HEIGHT: 73 IN | HEART RATE: 70 BPM | RESPIRATION RATE: 16 BRPM | DIASTOLIC BLOOD PRESSURE: 78 MMHG | WEIGHT: 169 LBS | OXYGEN SATURATION: 98 % | BODY MASS INDEX: 22.4 KG/M2 | SYSTOLIC BLOOD PRESSURE: 129 MMHG | TEMPERATURE: 98 F

## 2025-07-16 PROCEDURE — 99239 HOSP IP/OBS DSCHRG MGMT >30: CPT | Performed by: HOSPITALIST

## 2025-07-16 RX ORDER — HYDROCODONE BITARTRATE AND ACETAMINOPHEN 5; 325 MG/1; MG/1
1-2 TABLET ORAL EVERY 4 HOURS PRN
Refills: 0 | Status: DISCONTINUED | OUTPATIENT
Start: 2025-07-16 | End: 2025-07-16

## 2025-07-16 RX ORDER — HYDROCODONE BITARTRATE AND ACETAMINOPHEN 5; 325 MG/1; MG/1
1 TABLET ORAL EVERY 6 HOURS PRN
Refills: 0 | Status: DISCONTINUED | OUTPATIENT
Start: 2025-07-16 | End: 2025-07-16

## 2025-07-16 RX ORDER — HYDROCODONE BITARTRATE AND ACETAMINOPHEN 5; 325 MG/1; MG/1
1 TABLET ORAL EVERY 4 HOURS PRN
Qty: 15 TABLET | Refills: 0 | Status: SHIPPED | OUTPATIENT
Start: 2025-07-16

## 2025-07-16 RX ORDER — HYDROCODONE BITARTRATE AND ACETAMINOPHEN 5; 325 MG/1; MG/1
2 TABLET ORAL EVERY 6 HOURS PRN
Refills: 0 | Status: DISCONTINUED | OUTPATIENT
Start: 2025-07-16 | End: 2025-07-16

## 2025-07-16 NOTE — DISCHARGE SUMMARY
[facility]     Austin Mcfarland Patient Status:  Inpatient    1990 MRN [id number]   Location [facility] 5NW-A Attending No att. providers found   Hosp Day # 1 PCP FRANK LOERA     Date of Admission: 2025  Date of Discharge: 2025    Discharge Disposition: Home or Self Care    Admitting Diagnosis:   Acute cystitis with hematuria [N30.01]  Ureteral stent present [Z96.0]    Hospital Discharge Diagnoses:  # UTI- Urostomy associated POA  - +Enterococcus  - Unasyn     # Hydronephrosis- improvement   - Urostomy tube was placed by urology  - Urology on consult    Lace+ Score: 57  59-90 High Risk  29-58 Medium Risk  0-28   Low Risk.     Brief Synopsis: Patient was seen by Urology and no further intervention at this time.  Patient improved with conservative mgmt and cleared by Urology. Pt DC in good condition.   Pt to go to [facility] for 2nd opinion per Urology     Procedures during hospitalization:   None     Lab/Test results pending at Discharge:   none    Consultants:  Urology     Discharge Medication List:     Discharge Medications        START taking these medications        Instructions Prescription details   amoxicillin clavulanate 875-125 MG Tabs  Commonly known as: Augmentin      Take 1 tablet by mouth 2 (two) times daily for 10 days.   Stop taking on: 2025  Quantity: 20 tablet  Refills: 0            CHANGE how you take these medications        Instructions Prescription details   HYDROcodone-acetaminophen 5-325 MG Tabs  Commonly known as: Norco  What changed: how much to take      Take 1 tablet by mouth every 4 (four) hours as needed for Pain.   Quantity: 15 tablet  Refills: 0            CONTINUE taking these medications        Instructions Prescription details   acetaminophen 500 MG Tabs  Commonly known as: Tylenol Extra Strength      Take 1-2 tablets (500-1,000 mg total) by mouth every 4 (four) hours as needed for Pain.   Refills: 0     Naloxone HCl 4  MG/0.1ML Liqd      4 mg by Nasal route as needed. If patient remains unresponsive, repeat dose in other nostril 2-5 minutes after first dose.   Quantity: 1 kit  Refills: 0     oxybutynin 5 MG Tabs  Commonly known as: Ditropan      Take 1 tablet (5 mg total) by mouth 4 (four) times daily as needed (bladder spasms).   Quantity: 30 tablet  Refills: 0               Where to Get Your Medications        These medications were sent to [id number] - [place], [place] - [id number] AT [facility], [phone], [phone] [id number] , [facility] [address]      Phone: [phone]   amoxicillin clavulanate 875-125 MG Tabs  HYDROcodone-acetaminophen 5-325 MG Tabs         Follow-up appointment:   Charlene Johnson MD  [address] [facility] [facility]  [address] [place] [address] [id number] [phone]    Schedule an appointment as soon as possible for a visit in 1 week(s)  for cystoscopy/stent removal in 1 week in OR while on antibiotics      -----------------------------------------------------------------------------------------------  PATIENT DISCHARGE INSTRUCTIONS: See electronic chart    Rajiv Tomas MD 7/16/2025    Time spent: 33 minutes

## 2025-07-16 NOTE — PROGRESS NOTES
[facility]  Urology Progress Note    Austin Mcfarland Patient Status:  Inpatient    1990 MRN [id number]   Location [facility] 5NW-A Attending Rajiv Tomas MD   Hosp Day # 1 PCP FRANK LOERA     Subjective:  Austin Mcfarland is a(n) 34 year old male    Current complaints: Left flank pain improving.  Pyridium helping with dysuria.  +urinary frequency.  No fever or chills.      Objective:  General appearance: alert, appears stated age, and cooperative  Blood pressure 121/75, pulse 54, temperature 99.1 °F (37.3 °C), temperature source Oral, resp. rate 16, height 6' 1\" (1.854 m), weight 169 lb (76.7 kg), SpO2 99%.  Lungs: non-labored respirations  Abdomen: soft, non-tender  Left NT in place (UOP - 1450 mL/24 hours)       Hospital Encounter on 25   1. Blood Culture     Status: None (Preliminary result)    Collection Time: 25  1:01 PM    Specimen: Blood,peripheral   Result Value Ref Range    Blood Culture Result No Growth 1 Day N/A   2. Urine Culture, Routine     Status: Abnormal    Collection Time: 25 11:58 AM    Specimen: Urine, clean catch   Result Value Ref Range    Urine Culture >100,000 CFU/ML Enterococcus faecalis NOT VRE (A) N/A        CT ABDOMEN+PELVIS KIDNEYSTONE 2D RNDR(NO IV,NO ORAL)(CPT=74176)  Result Date: 2025  CONCLUSION: 1. Interval placement left percutaneous nephrostomy tube with interval decrease in left hydronephrosis. 2. Left ureteral stent remains in position. There is a small ureteral calcification adjacent to the stent. 3. Multiple nonobstructing stones are noted in the left kidney. 4. Other incidental findings are noted above. Electronically Verified and Signed by Attending Radiologist: Param Carrillo MD 2025 4:03 PM Workstation: [id number]       Assessment:    HISTORY OF RECURRENT UROLITHIASIS AND LEFT URETERAL STRICTURE  S/P prior buccal mucosal graft ureteroplasty, and more recent left ureterolysis with Dr. Johnson in May, 2025   S/P  cystoscopy, left ureteral stent placement 6/23/25 with Dr. Toth  S/P left NT 6/26/25 with IR     LEFT FLANK PAIN, DYSURIA, URINARY FREQUENCY, UTI  Afebrile  Lactic acid 1.1  WBC 7.6 > 6.9  Serum creat 1.01 > 0.91  UA 7/14/25: >50 WBC/hpf, >10 RBC/hpf, no bacteria  Urine culture 7/14/25: >100K CFU/mL Enterococcus faecalis not VRE  2/2 blood cultures 7/14/25: prelim no growth after 1 day  Abdominal/pelvic CT scan without contrast 7/14/25: There is a left percutaneous nephrostomy tube with interval decrease in left hydronephrosis.  There is a left ureteral stent. There are multiple nonobstructing calcifications in the left kidney. There is a 3 to 4 mm calcification within the left mid to proximal ureter adjacent to the stent.  Right kidney is unremarkable.   URINARY BLADDER: Normal. No visible focal wall thickening, lesion, or calculus.   -Note: Dr. Jane reviewed CT scan - does not think he has stone within the ureter.     Plan:    Check final cultures  Abx per  attending  Follow labs, temp  Continue with left NT  Pain management  Pyridium prn  Cystoscopy/left ureteral stent removal in OR as an outpatient after infection has been treated. - TE placed through [facility] to help schedule procedure in 1 week while on abx  Second opinion at [facility] after discharge     Above discussed with patient, Dr. Jane.      Monse Caldwell PA-C  [facility]  Department of Urology  7/16/2025  8:11 AM

## 2025-07-16 NOTE — PLAN OF CARE
Pt from home  Aox4  VSS on RA  afebrile  No tele, receiving Lovenox  Electrolyte non-cardiac replacement  Continent, left nephrostomy is in place  Up self, call light within reach  Regular diet  Receiving Rocephin and 0.9% NS @12  Pain management per MAR  Pt/family updated on current POC, no questions at this time    Problem: GENITOURINARY - ADULT  Goal: Absence of urinary retention  Description: INTERVENTIONS:  - Assess patient’s ability to void and empty bladder  - Monitor intake/output and perform bladder scan as needed  - Follow urinary retention protocol/standard of care  - Consider collaborating with pharmacy to review patient's medication profile  - Implement strategies to promote bladder emptying  Outcome: Progressing     Problem: Patient/Family Goals  Goal: Patient/Family Long Term Goal  Description: Patient's Long Term Goal: DC    Interventions:  - Uro consult  - See additional Care Plan goals for specific interventions  Outcome: Progressing  Goal: Patient/Family Short Term Goal  Description: Patient's Short Term Goal: 7/14 noc- sleep/pain manaegment    Interventions:   - PRN meds  - Cluster care  - See additional Care Plan goals for specific interventions  Outcome: Progressing     Problem: PAIN - ADULT  Goal: Verbalizes/displays adequate comfort level or patient's stated pain goal  Description: INTERVENTIONS:  - Encourage pt to monitor pain and request assistance  - Assess pain using appropriate pain scale  - Administer analgesics based on type and severity of pain and evaluate response  - Implement non-pharmacological measures as appropriate and evaluate response  - Consider cultural and social influences on pain and pain management  - Manage/alleviate anxiety  - Utilize distraction and/or relaxation techniques  - Monitor for opioid side effects  - Notify MD/LIP if interventions unsuccessful or patient reports new pain  - Anticipate increased pain with activity and pre-medicate as appropriate  Outcome:  Progressing

## 2025-07-16 NOTE — PROGRESS NOTES
7/16/2025    Patient Name: Austin Mcfarland      To Whom It May Concern:    This letter has been written at the patient's request. The above patient was seen at [facility] for treatment of a medical condition from 7/14/2025 - 7/16/2025.  Please excuse my patient's absence.    Sincerely,        Rajiv Tomas  7/16/2025

## 2025-07-16 NOTE — PLAN OF CARE
Problem: GENITOURINARY - ADULT  Goal: Absence of urinary retention  Description: INTERVENTIONS:  - Assess patient’s ability to void and empty bladder  - Monitor intake/output and perform bladder scan as needed  - Follow urinary retention protocol/standard of care  - Consider collaborating with pharmacy to review patient's medication profile  - Implement strategies to promote bladder emptying  7/16/2025 1343 by Sagrario Wyatt RN  Outcome: Completed  7/16/2025 0945 by Sagrario Wyatt RN  Outcome: Progressing     Problem: Patient/Family Goals  Goal: Patient/Family Long Term Goal  Description: Patient's Long Term Goal: DC    Interventions:  - Uro consult  - See additional Care Plan goals for specific interventions  7/16/2025 1343 by Sagrario Wyatt RN  Outcome: Completed  7/16/2025 0945 by Sagrario Wyatt RN  Outcome: Progressing  Goal: Patient/Family Short Term Goal  Description: Patient's Short Term Goal: 7/14 noc- sleep/pain manaegment    Interventions:   - PRN meds  - Cluster care  - See additional Care Plan goals for specific interventions  7/16/2025 1343 by Sagrario Wyatt RN  Outcome: Completed  7/16/2025 0945 by Sagrario Wyatt RN  Outcome: Progressing     Problem: PAIN - ADULT  Goal: Verbalizes/displays adequate comfort level or patient's stated pain goal  Description: INTERVENTIONS:  - Encourage pt to monitor pain and request assistance  - Assess pain using appropriate pain scale  - Administer analgesics based on type and severity of pain and evaluate response  - Implement non-pharmacological measures as appropriate and evaluate response  - Consider cultural and social influences on pain and pain management  - Manage/alleviate anxiety  - Utilize distraction and/or relaxation techniques  - Monitor for opioid side effects  - Notify MD/LIP if interventions unsuccessful or patient reports new pain  - Anticipate increased pain with activity and pre-medicate as appropriate  7/16/2025 1343 by Sagrario Wyatt, RN  Outcome:  Completed  7/16/2025 0945 by Sagrario Wyatt, RN  Outcome: Progressing

## 2025-07-16 NOTE — PAYOR COMM NOTE
Notifying of status change to inpatient on 7/15/25    --------------  CONTINUED STAY REVIEW    Payor: [facility]   Subscriber #:  [id number]  Authorization Number: [id number]    Admit date: 7/15/25  Admit time:  3:29 PM    REVIEW DOCUMENTATION:    Admit Orders (From admission, onward)       Start     Ordered    07/15/25 1530  Admit to inpatient Once  Once        Ordering Provider: Rajiv Tomas MD   Question:  Diagnosis  Answer:  Acute cystitis with hematuria    07/15/25 1529    07/14/25 1855  Place in observation Once  (Place Observ[facility])  Once        Ordering Provider: Richard Lucas MD   Question:  Diagnosis  Answer:  Acute cystitis with hematuria    07/14/25 1854             7/15  Assessment & Plan:  # UTI- Urostomy associated POA  - Continue ceftriaxone and f/u Cx      # Hydronephrosis- improvement   - Urostomy tube was placed by urology  - Urology on consult        Urology  HISTORY OF RECURRENT UROLITHIASIS AND LEFT URETERAL STRICTURE  S/P prior buccal mucosal graft ureteroplasty, and more recent left ureterolysis with Dr. Johnson in May, 2025   S/P cystoscopy, left ureteral stent placement 6/23/25 with Dr. Toth  S/P left NT 6/26/25 with IR     LEFT FLANK PAIN, DYSURIA, URINARY FREQUENCY, UTI  Lactic acid 1.1  WBC 7.6 > 6.9  Hgb 15 > 13.4  Platelet count 211 > 188  Serum creat 1.01 > 0.91  UA 7/14/25: >50 WBC/hpf, >10 RBC/hpf, no bacteria  Urine culture 7/14/25: pending  2/2 blood cultures 7/14/25: pending  Abdominal/pelvic CT scan without contrast 7/14/25: There is a left percutaneous nephrostomy tube with interval decrease in left hydronephrosis.  There is a left ureteral stent. There are multiple nonobstructing calcifications in the left kidney. There is a 3 to 4 mm calcification within the left mid to proximal ureter adjacent to the stent.  Right kidney is unremarkable.   URINARY BLADDER: Normal. No visible focal wall thickening, lesion, or calculus.         Recommendations:  -  Buck reviewed CT scan - does not think he has stone within the ureter.    -No acute urological intervention required  -Check final cultures  -Abx per attending  -Follow labs, temp, UOP  -Pain management   -Pyridium prn  -Supportive care  -Continue present management with left NT  -Consideration for stent removal prior to discharge pending clinical course.    -Second opinion at [facility] after discharge      MEDICATIONS ADMINISTERED IN LAST 1 DAY:  ampicillin-sulbactam (Unasyn) 3 g in sodium chloride 0.9% 100mL IVPB-PATRICK       Date Action Dose Route User    7/16/2025 0846 New Bag 3 g Intravenous Sagrario Wyatt RN    7/16/2025 0355 New Bag 3 g Intravenous Karla Mena RN    7/15/2025 2118 New Bag 3 g Intravenous Karla Mena RN    7/15/2025 1629 New Bag 3 g Intravenous Yessenia Lima RN          cefTRIAXone (Rocephin) 1 g in sodium chloride 0.9% 100 mL IVPB-ADDV       Date Action Dose Route User    7/15/2025 1218 New Bag 1 g Intravenous Yessenia Lima RN          HYDROcodone-acetaminophen (Norco) 5-325 MG per tab 2 tablet       Date Action Dose Route User    7/16/2025 1101 Given 2 tablet Oral Sagrario Wyatt RN          ketorolac (Toradol) 30 MG/ML injection 30 mg       Date Action Dose Route User    7/16/2025 0355 Given 30 mg Intravenous Karla Mena RN    7/15/2025 2118 Given 30 mg Intravenous Karla Mena RN    7/15/2025 1417 Given 30 mg Intravenous Yessenia Lima RN          morphINE PF 4 MG/ML injection 4 mg       Date Action Dose Route User    7/16/2025 0845 Given 4 mg Intravenous Sagrario Wyatt RN    7/16/2025 0623 Given 4 mg Intravenous Karla Mena RN    7/16/2025 0108 Given 4 mg Intravenous Karla Mena RN    7/15/2025 2118 Given 4 mg Intravenous Karla Mena RN    7/15/2025 1840 Given 4 mg Intravenous Yessenia Lima RN    7/15/2025 1625 Given 4 mg Intravenous Yessenia Lima RN    7/15/2025 1216 Given 4 mg Intravenous Yessenia Lima RN           phenazopyridine (Pyridium) tab 200 mg       Date Action Dose Route User    7/16/2025 0623 Given 200 mg Oral Karla Mena RN    7/15/2025 1634 Given 200 mg Oral Yessenia Lima RN          sodium chloride 0.9% infusion       Date Action Dose Route User    7/16/2025 0355 New Bag (none) Intravenous Karla Mena, RN            Vitals (last day)       Date/Time Temp Pulse Resp BP SpO2 Weight O2 Device O2 Flow Rate (L/min) [facility]    07/16/25 0434 99.1 °F (37.3 °C) 54 16 121/75 99 % -- -- -- NV    07/15/25 1924 98.1 °F (36.7 °C) 65 18 119/76 99 % -- -- -- NV    07/15/25 1924 -- -- -- -- -- -- -- 0 L/min     07/15/25 1625 98.3 °F (36.8 °C) 72 18 122/79 99 % -- None (Room air) --     07/15/25 0850 98.1 °F (36.7 °C) 63 16 127/73 99 % -- None (Room air) --     07/15/25 0558 97.8 °F (36.6 °C) 68 18 115/86 -- -- None (Room air) --           CIWA Scores (since admission)       None

## 2025-07-16 NOTE — PROGRESS NOTES
[facility]   part of [facility]     Hospitalist Progress Note     Austin Mcfarland Patient Status:  Observation    1990 MRN [id number]   Location [facility] 5NW-A Attending Rajiv Tomas MD   Hosp Day # 1 PCP FRANK LOERA     Subjective:   Has some pain but otherwise ok     Objective:    Review of Systems:   A comprehensive review of systems was completed; pertinent positive and negatives stated in subjective.  Vital signs:  Temp:  [98.1 °F (36.7 °C)-99.1 °F (37.3 °C)] 99.1 °F (37.3 °C)  Pulse:  [54-72] 54  Resp:  [16-18] 16  BP: (119-127)/(73-79) 121/75  SpO2:  [99 %] 99 %  Physical Exam:    General: No acute distress    Respiratory: no wheezes, no rhonchi  Cardiovascular: S1, S2, RRR  Abdomen: Soft, NT/ND, +BS  Extremities: no edema    Diagnostic Data:    Labs:  Recent Labs   Lab 25  1255 07/15/25  0621   WBC 7.6 6.9   HGB 15.0 13.4   MCV 87.1 90.2   .0 188.0     Recent Labs   Lab 25  1255 07/15/25  0621   GLU 95 93   BUN 7* 7*   CREATSERUM 1.01 0.91   CA 9.0 8.4*   ALB 4.8  --     141   K 4.2 4.2    104   CO2 30.0 31.0   ALKPHO 89  --    AST 16  --    ALT 12  --    BILT 0.7  --    TP 7.8  --      Estimated Creatinine Clearance: 125.3 mL/min (based on SCr of 0.91 mg/dL).  No results for input(s): \"PTP\", \"INR\" in the last 168 hours.     Microbiology  Hospital Encounter on 25   1. Blood Culture     Status: None (Preliminary result)    Collection Time: 25  1:01 PM    Specimen: Blood,peripheral   Result Value Ref Range    Blood Culture Result No Growth 1 Day N/A   2. Urine Culture, Routine     Status: Abnormal    Collection Time: 25 11:58 AM    Specimen: Urine, clean catch   Result Value Ref Range    Urine Culture >100,000 CFU/ML Enterococcus faecalis NOT VRE (A) N/A     Imaging: Reviewed in Epic.  Medications: Scheduled Medications[1]    Assessment & Plan:    # UTI- Urostomy associated POA  - +Enterococcus  - Unasyn    # Hydronephrosis-  improvement   - Urostomy tube was placed by urology  - Urology on consult    DC home          Supplementary Documentation:   Quality:  DVT Mechanical Prophylaxis:   SCDs,    DVT Pharmacologic Prophylaxis   Medication    enoxaparin (Lovenox) 40 MG/0.4ML SUBQ injection 40 mg                Code Status: Not on file  Michaels: No urinary catheter in place  Michaels Duration (in days):   Central line:    BRANDIE:   At this point Mr. Mcfarland is expected to be discharge to: home     The 21st Century Cures Act makes medical notes like these available to patients in the interest of transparency. Please be advised this is a medical document. Medical documents are intended to carry relevant information, facts as evident, and the clinical opinion of the practitioner. The medical note is intended as peer to peer communication and may appear blunt or direct. It is written in medical language and may contain abbreviations or verbiage that are unfamiliar.                  [1]    ampicillin-sulbactam  3 g Intravenous q6h    enoxaparin  40 mg Subcutaneous Daily

## 2025-07-16 NOTE — PLAN OF CARE
Patient AAOx4. Room air. No tele Q8VS. Refusing lovenox and SCDs. Ambulatory. Left nephrostomy, voids. Morphine given for pain. Up ad adenike. Regular diet. IVF. IV unasyn. Patient rounded on routinely. Patient updated on plan of care.    MD paged regarding patient request for pain meds. Stated to give PO Norco- ordered.      Problem: GENITOURINARY - ADULT  Goal: Absence of urinary retention  Description: INTERVENTIONS:  - Assess patient’s ability to void and empty bladder  - Monitor intake/output and perform bladder scan as needed  - Follow urinary retention protocol/standard of care  - Consider collaborating with pharmacy to review patient's medication profile  - Implement strategies to promote bladder emptying  Outcome: Progressing     Problem: Patient/Family Goals  Goal: Patient/Family Long Term Goal  Description: Patient's Long Term Goal: DC    Interventions:  - Uro consult  - See additional Care Plan goals for specific interventions  Outcome: Progressing  Goal: Patient/Family Short Term Goal  Description: Patient's Short Term Goal: 7/14 noc- sleep/pain manaegment    Interventions:   - PRN meds  - Cluster care  - See additional Care Plan goals for specific interventions  Outcome: Progressing     Problem: PAIN - ADULT  Goal: Verbalizes/displays adequate comfort level or patient's stated pain goal  Description: INTERVENTIONS:  - Encourage pt to monitor pain and request assistance  - Assess pain using appropriate pain scale  - Administer analgesics based on type and severity of pain and evaluate response  - Implement non-pharmacological measures as appropriate and evaluate response  - Consider cultural and social influences on pain and pain management  - Manage/alleviate anxiety  - Utilize distraction and/or relaxation techniques  - Monitor for opioid side effects  - Notify MD/LIP if interventions unsuccessful or patient reports new pain  - Anticipate increased pain with activity and pre-medicate as  appropriate  Outcome: Progressing

## 2025-07-16 NOTE — PLAN OF CARE
NURSING DISCHARGE NOTE    Discharged Home via Ambulatory.  Accompanied by Support staff  Belongings Taken by patient/family.    Patient discharged in stable condition. IV removed. AVS reviewed with patient. Bedside belongings taken.

## 2025-07-22 ENCOUNTER — APPOINTMENT (OUTPATIENT)
Dept: CT IMAGING | Facility: HOSPITAL | Age: 35
DRG: 690 | End: 2025-07-22
Attending: EMERGENCY MEDICINE

## 2025-07-22 ENCOUNTER — ANESTHESIA (OUTPATIENT)
Dept: SURGERY | Facility: HOSPITAL | Age: 35
End: 2025-07-22
Payer: COMMERCIAL

## 2025-07-22 ENCOUNTER — ANESTHESIA EVENT (OUTPATIENT)
Dept: SURGERY | Facility: HOSPITAL | Age: 35
End: 2025-07-22
Payer: COMMERCIAL

## 2025-07-22 ENCOUNTER — HOSPITAL ENCOUNTER (INPATIENT)
Facility: HOSPITAL | Age: 35
LOS: 1 days | Discharge: HOME | DRG: 690 | End: 2025-07-22
Attending: EMERGENCY MEDICINE | Admitting: STUDENT IN AN ORGANIZED HEALTH CARE EDUCATION/TRAINING PROGRAM

## 2025-07-22 ENCOUNTER — APPOINTMENT (OUTPATIENT)
Dept: CT IMAGING | Facility: HOSPITAL | Age: 35
End: 2025-07-22
Attending: EMERGENCY MEDICINE

## 2025-07-22 ENCOUNTER — APPOINTMENT (OUTPATIENT)
Dept: GENERAL RADIOLOGY | Facility: HOSPITAL | Age: 35
DRG: 690 | End: 2025-07-22
Attending: UROLOGY

## 2025-07-22 ENCOUNTER — APPOINTMENT (OUTPATIENT)
Dept: GENERAL RADIOLOGY | Facility: HOSPITAL | Age: 35
End: 2025-07-22
Attending: UROLOGY

## 2025-07-22 ENCOUNTER — HOSPITAL ENCOUNTER (INPATIENT)
Facility: HOSPITAL | Age: 35
LOS: 1 days | Discharge: HOME | End: 2025-07-22
Attending: EMERGENCY MEDICINE | Admitting: STUDENT IN AN ORGANIZED HEALTH CARE EDUCATION/TRAINING PROGRAM

## 2025-07-22 VITALS
OXYGEN SATURATION: 100 % | SYSTOLIC BLOOD PRESSURE: 114 MMHG | WEIGHT: 170 LBS | HEART RATE: 63 BPM | BODY MASS INDEX: 22.53 KG/M2 | HEIGHT: 73 IN | RESPIRATION RATE: 15 BRPM | DIASTOLIC BLOOD PRESSURE: 84 MMHG | TEMPERATURE: 98 F

## 2025-07-22 DIAGNOSIS — Z98.890 H/O INSERTION OF NEPHROSTOMY TUBE: ICD-10-CM

## 2025-07-22 DIAGNOSIS — R52 INTRACTABLE PAIN: ICD-10-CM

## 2025-07-22 DIAGNOSIS — N39.0 ACUTE UTI: ICD-10-CM

## 2025-07-22 DIAGNOSIS — R10.9 LEFT FLANK PAIN: Primary | ICD-10-CM

## 2025-07-22 DIAGNOSIS — Z96.0 URETERAL STENT PRESENT: ICD-10-CM

## 2025-07-22 LAB
ALBUMIN SERPL-MCNC: 4.8 G/DL (ref 3.2–4.8)
ALBUMIN/GLOB SERPL: 1.7 (ref 1–2)
ALP LIVER SERPL-CCNC: 84 U/L (ref 45–117)
ALT SERPL-CCNC: 20 U/L (ref 10–49)
ANION GAP SERPL CALC-SCNC: 8 MMOL/L (ref 0–18)
AST SERPL-CCNC: 24 U/L (ref ?–34)
BASOPHILS # BLD AUTO: 0.04 X10(3) UL (ref 0–0.2)
BASOPHILS NFR BLD AUTO: 0.7 %
BILIRUB SERPL-MCNC: 0.3 MG/DL (ref 0.3–1.2)
BILIRUB UR QL STRIP.AUTO: NEGATIVE
BUN BLD-MCNC: 10 MG/DL (ref 9–23)
CALCIUM BLD-MCNC: 9.2 MG/DL (ref 8.7–10.6)
CHLORIDE SERPL-SCNC: 104 MMOL/L (ref 98–112)
CLARITY UR REFRACT.AUTO: CLEAR
CO2 SERPL-SCNC: 28 MMOL/L (ref 21–32)
CREAT BLD-MCNC: 1.12 MG/DL (ref 0.7–1.3)
EGFRCR SERPLBLD CKD-EPI 2021: 89 ML/MIN/1.73M2 (ref 60–?)
EOSINOPHIL # BLD AUTO: 0.15 X10(3) UL (ref 0–0.7)
EOSINOPHIL NFR BLD AUTO: 2.6 %
ERYTHROCYTE [DISTWIDTH] IN BLOOD BY AUTOMATED COUNT: 12.3 %
GLOBULIN PLAS-MCNC: 2.9 G/DL (ref 2–3.5)
GLUCOSE BLD-MCNC: 90 MG/DL (ref 70–99)
GLUCOSE UR STRIP.AUTO-MCNC: NORMAL MG/DL
HCT VFR BLD AUTO: 42.1 % (ref 39–53)
HGB BLD-MCNC: 14.9 G/DL (ref 13–17.5)
IMM GRANULOCYTES # BLD AUTO: 0.01 X10(3) UL (ref 0–1)
IMM GRANULOCYTES NFR BLD: 0.2 %
KETONES UR STRIP.AUTO-MCNC: NEGATIVE MG/DL
LEUKOCYTE ESTERASE UR QL STRIP.AUTO: 75
LYMPHOCYTES # BLD AUTO: 1.95 X10(3) UL (ref 1–4)
LYMPHOCYTES NFR BLD AUTO: 34 %
MCH RBC QN AUTO: 30.7 PG (ref 26–34)
MCHC RBC AUTO-ENTMCNC: 35.4 G/DL (ref 31–37)
MCV RBC AUTO: 86.6 FL (ref 80–100)
MONOCYTES # BLD AUTO: 0.47 X10(3) UL (ref 0.1–1)
MONOCYTES NFR BLD AUTO: 8.2 %
NEUTROPHILS # BLD AUTO: 3.12 X10 (3) UL (ref 1.5–7.7)
NEUTROPHILS # BLD AUTO: 3.12 X10(3) UL (ref 1.5–7.7)
NEUTROPHILS NFR BLD AUTO: 54.3 %
NITRITE UR QL STRIP.AUTO: NEGATIVE
OSMOLALITY SERPL CALC.SUM OF ELEC: 289 MOSM/KG (ref 275–295)
PH UR STRIP.AUTO: 7 (ref 5–8)
PLATELET # BLD AUTO: 228 10(3)UL (ref 150–450)
POTASSIUM SERPL-SCNC: 4 MMOL/L (ref 3.5–5.1)
PROT SERPL-MCNC: 7.7 G/DL (ref 5.7–8.2)
PROT UR STRIP.AUTO-MCNC: NEGATIVE MG/DL
RBC # BLD AUTO: 4.86 X10(6)UL (ref 4.3–5.7)
SODIUM SERPL-SCNC: 140 MMOL/L (ref 136–145)
SP GR UR STRIP.AUTO: 1.01 (ref 1–1.03)
UROBILINOGEN UR STRIP.AUTO-MCNC: NORMAL MG/DL
WBC # BLD AUTO: 5.7 X10(3) UL (ref 4–11)

## 2025-07-22 PROCEDURE — 0TP98DZ REMOVAL OF INTRALUMINAL DEVICE FROM URETER, VIA NATURAL OR ARTIFICIAL OPENING ENDOSCOPIC: ICD-10-PCS | Performed by: UROLOGY

## 2025-07-22 PROCEDURE — BT1FYZZ FLUOROSCOPY OF LEFT KIDNEY, URETER AND BLADDER USING OTHER CONTRAST: ICD-10-PCS | Performed by: UROLOGY

## 2025-07-22 PROCEDURE — 99223 1ST HOSP IP/OBS HIGH 75: CPT | Performed by: STUDENT IN AN ORGANIZED HEALTH CARE EDUCATION/TRAINING PROGRAM

## 2025-07-22 PROCEDURE — 74176 CT ABD & PELVIS W/O CONTRAST: CPT | Performed by: EMERGENCY MEDICINE

## 2025-07-22 RX ORDER — IOPAMIDOL 612 MG/ML
INJECTION, SOLUTION INTRAVASCULAR AS NEEDED
Status: DISCONTINUED | OUTPATIENT
Start: 2025-07-22 | End: 2025-07-22 | Stop reason: HOSPADM

## 2025-07-22 RX ORDER — SODIUM PHOSPHATE, DIBASIC AND SODIUM PHOSPHATE, MONOBASIC 7; 19 G/230ML; G/230ML
1 ENEMA RECTAL ONCE AS NEEDED
Status: DISCONTINUED | OUTPATIENT
Start: 2025-07-22 | End: 2025-07-22

## 2025-07-22 RX ORDER — POLYETHYLENE GLYCOL 3350 17 G/17G
17 POWDER, FOR SOLUTION ORAL DAILY PRN
Status: DISCONTINUED | OUTPATIENT
Start: 2025-07-22 | End: 2025-07-22

## 2025-07-22 RX ORDER — PROCHLORPERAZINE EDISYLATE 5 MG/ML
5 INJECTION INTRAMUSCULAR; INTRAVENOUS EVERY 8 HOURS PRN
Status: DISCONTINUED | OUTPATIENT
Start: 2025-07-22 | End: 2025-07-22

## 2025-07-22 RX ORDER — MEPERIDINE HYDROCHLORIDE 25 MG/ML
12.5 INJECTION INTRAMUSCULAR; INTRAVENOUS; SUBCUTANEOUS AS NEEDED
Status: DISCONTINUED | OUTPATIENT
Start: 2025-07-22 | End: 2025-07-22 | Stop reason: HOSPADM

## 2025-07-22 RX ORDER — SODIUM CHLORIDE 9 MG/ML
INJECTION, SOLUTION INTRAVENOUS CONTINUOUS
Status: DISCONTINUED | OUTPATIENT
Start: 2025-07-22 | End: 2025-07-22

## 2025-07-22 RX ORDER — NALOXONE HYDROCHLORIDE 0.4 MG/ML
80 INJECTION, SOLUTION INTRAMUSCULAR; INTRAVENOUS; SUBCUTANEOUS AS NEEDED
Status: DISCONTINUED | OUTPATIENT
Start: 2025-07-22 | End: 2025-07-22 | Stop reason: HOSPADM

## 2025-07-22 RX ORDER — SODIUM CHLORIDE 9 MG/ML
INJECTION, SOLUTION INTRAVENOUS CONTINUOUS
Status: DISCONTINUED | OUTPATIENT
Start: 2025-07-22 | End: 2025-07-22 | Stop reason: HOSPADM

## 2025-07-22 RX ORDER — KETOROLAC TROMETHAMINE 30 MG/ML
INJECTION, SOLUTION INTRAMUSCULAR; INTRAVENOUS AS NEEDED
Status: DISCONTINUED | OUTPATIENT
Start: 2025-07-22 | End: 2025-07-22 | Stop reason: SURG

## 2025-07-22 RX ORDER — LABETALOL HYDROCHLORIDE 5 MG/ML
5 INJECTION, SOLUTION INTRAVENOUS EVERY 5 MIN PRN
Status: DISCONTINUED | OUTPATIENT
Start: 2025-07-22 | End: 2025-07-22 | Stop reason: HOSPADM

## 2025-07-22 RX ORDER — DEXAMETHASONE SODIUM PHOSPHATE 4 MG/ML
VIAL (ML) INJECTION AS NEEDED
Status: DISCONTINUED | OUTPATIENT
Start: 2025-07-22 | End: 2025-07-22 | Stop reason: SURG

## 2025-07-22 RX ORDER — HYDROCODONE BITARTRATE AND ACETAMINOPHEN 5; 325 MG/1; MG/1
2 TABLET ORAL ONCE AS NEEDED
Status: DISCONTINUED | OUTPATIENT
Start: 2025-07-22 | End: 2025-07-22 | Stop reason: HOSPADM

## 2025-07-22 RX ORDER — KETOROLAC TROMETHAMINE 30 MG/ML
30 INJECTION, SOLUTION INTRAMUSCULAR; INTRAVENOUS EVERY 6 HOURS PRN
Status: DISCONTINUED | OUTPATIENT
Start: 2025-07-22 | End: 2025-07-22

## 2025-07-22 RX ORDER — PHENAZOPYRIDINE HYDROCHLORIDE 200 MG/1
200 TABLET, FILM COATED ORAL 3 TIMES DAILY PRN
Status: DISCONTINUED | OUTPATIENT
Start: 2025-07-22 | End: 2025-07-22

## 2025-07-22 RX ORDER — LIDOCAINE HYDROCHLORIDE 20 MG/ML
JELLY TOPICAL AS NEEDED
Status: DISCONTINUED | OUTPATIENT
Start: 2025-07-22 | End: 2025-07-22 | Stop reason: HOSPADM

## 2025-07-22 RX ORDER — ONDANSETRON 2 MG/ML
4 INJECTION INTRAMUSCULAR; INTRAVENOUS EVERY 6 HOURS PRN
Status: DISCONTINUED | OUTPATIENT
Start: 2025-07-22 | End: 2025-07-22 | Stop reason: HOSPADM

## 2025-07-22 RX ORDER — MIDAZOLAM HYDROCHLORIDE 1 MG/ML
1 INJECTION INTRAMUSCULAR; INTRAVENOUS EVERY 5 MIN PRN
Status: DISCONTINUED | OUTPATIENT
Start: 2025-07-22 | End: 2025-07-22 | Stop reason: HOSPADM

## 2025-07-22 RX ORDER — HYDROMORPHONE HYDROCHLORIDE 1 MG/ML
1 INJECTION, SOLUTION INTRAMUSCULAR; INTRAVENOUS; SUBCUTANEOUS ONCE
Refills: 0 | Status: COMPLETED | OUTPATIENT
Start: 2025-07-22 | End: 2025-07-22

## 2025-07-22 RX ORDER — ECHINACEA PURPUREA EXTRACT 125 MG
1 TABLET ORAL
Status: DISCONTINUED | OUTPATIENT
Start: 2025-07-22 | End: 2025-07-22

## 2025-07-22 RX ORDER — ACETAMINOPHEN 500 MG
500 TABLET ORAL EVERY 4 HOURS PRN
Status: DISCONTINUED | OUTPATIENT
Start: 2025-07-22 | End: 2025-07-22

## 2025-07-22 RX ORDER — KETOROLAC TROMETHAMINE 15 MG/ML
15 INJECTION, SOLUTION INTRAMUSCULAR; INTRAVENOUS ONCE
Status: COMPLETED | OUTPATIENT
Start: 2025-07-22 | End: 2025-07-22

## 2025-07-22 RX ORDER — ACETAMINOPHEN 500 MG
1000 TABLET ORAL ONCE AS NEEDED
Status: DISCONTINUED | OUTPATIENT
Start: 2025-07-22 | End: 2025-07-22 | Stop reason: HOSPADM

## 2025-07-22 RX ORDER — LIDOCAINE HYDROCHLORIDE 10 MG/ML
INJECTION, SOLUTION EPIDURAL; INFILTRATION; INTRACAUDAL; PERINEURAL AS NEEDED
Status: DISCONTINUED | OUTPATIENT
Start: 2025-07-22 | End: 2025-07-22 | Stop reason: SURG

## 2025-07-22 RX ORDER — KETOROLAC TROMETHAMINE 15 MG/ML
15 INJECTION, SOLUTION INTRAMUSCULAR; INTRAVENOUS EVERY 6 HOURS PRN
Status: DISCONTINUED | OUTPATIENT
Start: 2025-07-22 | End: 2025-07-22

## 2025-07-22 RX ORDER — ONDANSETRON 2 MG/ML
4 INJECTION INTRAMUSCULAR; INTRAVENOUS ONCE
Status: COMPLETED | OUTPATIENT
Start: 2025-07-22 | End: 2025-07-22

## 2025-07-22 RX ORDER — ONDANSETRON 2 MG/ML
4 INJECTION INTRAMUSCULAR; INTRAVENOUS EVERY 6 HOURS PRN
Status: DISCONTINUED | OUTPATIENT
Start: 2025-07-22 | End: 2025-07-22

## 2025-07-22 RX ORDER — HYDROMORPHONE HYDROCHLORIDE 1 MG/ML
0.5 INJECTION, SOLUTION INTRAMUSCULAR; INTRAVENOUS; SUBCUTANEOUS EVERY 4 HOURS PRN
Status: DISCONTINUED | OUTPATIENT
Start: 2025-07-22 | End: 2025-07-22

## 2025-07-22 RX ORDER — HYDROMORPHONE HYDROCHLORIDE 1 MG/ML
0.6 INJECTION, SOLUTION INTRAMUSCULAR; INTRAVENOUS; SUBCUTANEOUS EVERY 5 MIN PRN
Status: DISCONTINUED | OUTPATIENT
Start: 2025-07-22 | End: 2025-07-22 | Stop reason: HOSPADM

## 2025-07-22 RX ORDER — HYDROCODONE BITARTRATE AND ACETAMINOPHEN 5; 325 MG/1; MG/1
1 TABLET ORAL ONCE AS NEEDED
Status: DISCONTINUED | OUTPATIENT
Start: 2025-07-22 | End: 2025-07-22 | Stop reason: HOSPADM

## 2025-07-22 RX ORDER — SENNOSIDES 8.6 MG
17.2 TABLET ORAL NIGHTLY PRN
Status: DISCONTINUED | OUTPATIENT
Start: 2025-07-22 | End: 2025-07-22

## 2025-07-22 RX ORDER — BENZONATATE 200 MG/1
200 CAPSULE ORAL 3 TIMES DAILY PRN
Status: DISCONTINUED | OUTPATIENT
Start: 2025-07-22 | End: 2025-07-22

## 2025-07-22 RX ORDER — BISACODYL 10 MG
10 SUPPOSITORY, RECTAL RECTAL
Status: DISCONTINUED | OUTPATIENT
Start: 2025-07-22 | End: 2025-07-22

## 2025-07-22 RX ORDER — PROCHLORPERAZINE EDISYLATE 5 MG/ML
5 INJECTION INTRAMUSCULAR; INTRAVENOUS EVERY 8 HOURS PRN
Status: DISCONTINUED | OUTPATIENT
Start: 2025-07-22 | End: 2025-07-22 | Stop reason: HOSPADM

## 2025-07-22 RX ORDER — HYDROMORPHONE HYDROCHLORIDE 1 MG/ML
0.4 INJECTION, SOLUTION INTRAMUSCULAR; INTRAVENOUS; SUBCUTANEOUS EVERY 5 MIN PRN
Status: DISCONTINUED | OUTPATIENT
Start: 2025-07-22 | End: 2025-07-22 | Stop reason: HOSPADM

## 2025-07-22 RX ORDER — HYDROMORPHONE HYDROCHLORIDE 1 MG/ML
0.2 INJECTION, SOLUTION INTRAMUSCULAR; INTRAVENOUS; SUBCUTANEOUS EVERY 5 MIN PRN
Status: DISCONTINUED | OUTPATIENT
Start: 2025-07-22 | End: 2025-07-22 | Stop reason: HOSPADM

## 2025-07-22 RX ORDER — ONDANSETRON 2 MG/ML
INJECTION INTRAMUSCULAR; INTRAVENOUS AS NEEDED
Status: DISCONTINUED | OUTPATIENT
Start: 2025-07-22 | End: 2025-07-22 | Stop reason: SURG

## 2025-07-22 RX ADMIN — DEXAMETHASONE SODIUM PHOSPHATE 4 MG: 4 MG/ML VIAL (ML) INJECTION at 16:01:00

## 2025-07-22 RX ADMIN — ONDANSETRON 4 MG: 2 INJECTION INTRAMUSCULAR; INTRAVENOUS at 16:20:00

## 2025-07-22 RX ADMIN — KETOROLAC TROMETHAMINE 30 MG: 30 INJECTION, SOLUTION INTRAMUSCULAR; INTRAVENOUS at 16:20:00

## 2025-07-22 RX ADMIN — LIDOCAINE HYDROCHLORIDE 50 MG: 10 INJECTION, SOLUTION EPIDURAL; INFILTRATION; INTRACAUDAL; PERINEURAL at 16:01:00

## 2025-07-22 NOTE — ANESTHESIA POSTPROCEDURE EVALUATION
[facility]    Austin Mcfarland Patient Status:  Inpatient   Age/Gender 34 year old male MRN [id number]   Location [facility] SURGERY Attending Huy Alanis MD   Hosp Day # 0 PCP FRANK LOERA       Anesthesia Post-op Note    CYSTOSCOPY, LEFT URETEROSCOPY , LEFT RETROGRADE PYELOGRAM, REMOVAL OF LEFT URETERAL STENT    Procedure Summary       Date: 07/22/25 Room / Location:  MAIN OR  /  MAIN OR    Anesthesia Start: 1600 Anesthesia Stop: 1632    Procedure: CYSTOSCOPY, LEFT URETEROSCOPY , LEFT RETROGRADE PYELOGRAM, REMOVAL OF LEFT URETERAL STENT (Left: Ureter) Diagnosis:       Calculi, ureter      (Calculi, ureter [N20.1])    Surgeons: Dex Jane DO Anesthesiologist: Jaun Malave MD    Anesthesia Type: general ASA Status: 2            Anesthesia Type: general    Vitals Value Taken Time   /75/ 07/22/25 16:34   Temp 98 07/22/25 16:34   Pulse 59 07/22/25 16:34   Resp 20 07/22/25 16:34   SpO2 95 07/22/25 16:34           Patient Location: PACU    Anesthesia Type: general    Airway Patency: patent    Postop Pain Control: adequate    Mental Status: mildly sedated but able to meaningfully participate in the post-anesthesia evaluation    Nausea/Vomiting: none    Cardiopulmonary/Hydration status: stable euvolemic    Complications: no apparent anesthesia related complications    Postop vital signs: stable    Dental Exam: Unchanged from Preop    Patient to be discharged from PACU when criteria met.

## 2025-07-22 NOTE — ED PROVIDER NOTES
Patient Seen in: [facility] Emergency Department        History  Chief Complaint   Patient presents with    Abdomen/Flank Pain     Stated Complaint: NEPHROSTOMY TUBE, CHILLS AND PAIN, RECENT ABX    Subjective:   Patient 34-year-old male who presents emergency room for evaluation of abdominal pain.  Patient reports that he has an indwelling PD stent and has a nephrostomy tube in place.  The stent was placed approximately 3 weeks ago but it got infected.  Patient was on antibiotics in the hospital and was discharged home with oral antibiotics.  She does have a tube in place on CT is completed antibiotics but he does have 4 more days of antibiotics left.  Patient reports increased pain today.  He tried his home Norco which normally does not require that it got too bad.  Patient denies any fevers but did have chills.    The history is provided by the patient.                     Objective:     Past Medical History:    Anxiety    Calculus of kidney    Congenital obstruction of ureteropelvic junction    Depression    Renal disorder    congenital left ureter stricture               Past Surgical History:   Procedure Laterality Date    Cystoscopy,+ureteroscopy Left 07/31/2010    s/p left rpg, left urs, left renoscopy, cysto, with stent placement 7-31-10 at [facility], Dr John Sheffield    Cystoscopy,+ureteroscopy Left 12/03/2010    L URS, Dr. Sheffield    Cystoscopy,+ureteroscopy Left     Cysto, URS, RPG, stent Bladder Stone removal-Dr Wilson    Cystoscopy,+ureteroscopy Left 03/19/2015    no stent placed, [facility]    Cystoscopy,insert ureteral stent  04/22/2014    Procedure: LITHOTRIPSY WITH CYSTOSCOPY, STENT PLACEMENT;  Surgeon: Dex Jane DO;  Location: [facility]    Cystoscopy,remv calculus,simple  12/27/2007    Performed by JOHN SHEFFIELD at [facility]    Cystoscopy,remv calculus,simple  05/23/2008    Performed by JOHN SHEFFIELD at [facility]    Cystourethroscopy Left 12/27/2010     cysto stent removal- Dr. Calderon    Cystourethroscopy Left 12/19/2014    Cysto Stent Removal-Dr Jane    Cystourethroscopy,ureter catheter  03/10/2008    Performed by DEBORAH CALDERON at [facility]    Cystourethroscopy,ureter catheter  04/22/2014    Procedure: LITHOTRIPSY WITH CYSTOSCOPY, STENT PLACEMENT;  Surgeon: Dex Jane DO;  Location: [facility]    Fragmenting of kidney stone  09/25/2007    Performed by DEBORAH CALDERON at [facility]    Fragmenting of kidney stone  09/25/2007    Performed by DEBORAH CALDERON at [facility]    Fragmenting of kidney stone  04/22/2014    Procedure: LITHOTRIPSY WITH CYSTOSCOPY, STENT PLACEMENT;  Surgeon: Dex Jane DO;  Location: [facility]    Ir nephhrostomy tube  2007    Cysto stent removal, nephrostomy tube placement    Laparoscopy, surgical; pyeloplasty  10/11/2007    Left UPJ repair    Lithotripsy Left 06/21/2007    Left ESWL    Other surgical history  10/08/2016    Cysto, Lt RPG, Lt URS & Nephroscopy, Stone Manipulation, Stone Basket & Removal, Stent Placement-Dr. Calderon     Other surgical history      left pcnl cdh    Other surgical history      stent placement cdh    Other surgical history  12/05/2016    cysto stent removal dr calderon    Other surgical history  12/11/2020    Cysto/stent removal Dr. Su    Other surgical history  06/25/2021    CYSTOSCOPY, LEFT DIAGNOSTIC URETEROSCOPY, LEFT RETROGRADE PYELOGRAM, LEFT URETERAL STENT PLACEMENT,    Other surgical history  06/11/2021    CYSTOSCOPY,LEFT URETEROSCOPY,, RETROGRADE PYELOGRAM, LEFT STENT INSERTION    Other surgical history  01/21/2022    Cysto, Lt URS, RPHG, LL, Stone Extraction, Lt Stent Placement - Dr. Steel    Other surgical history  02/03/2022    Cysto Stent Removal- Dr. Steel    Renal endoscopy  03/10/2008    Performed by DEBORAH CALDERON at [facility]    Special service or report  05/09/2007    Cysto, stone extraction     Special service or report  09/20/2006    Neph tube insertion with endopyelotomy    Special service or report  10/21/2007    Larger ureteral stent placed    Urology surgery procedure unlisted  05/23/2008    Performed by DEBORAH KOHLI at [facility]    X-ray antegrade pyelogram tube  03/10/2008    Performed by DEBORAH KOHLI at [facility]    X-ray retrograde pyelogram  03/10/2008    Performed by DEBORAH KOHLI at [facility]    X-ray retrograde pyelogram  05/23/2008    Performed by DEBORAH KOHLI at [facility]    X-ray retrograde pyelogram  04/22/2014    Procedure: LITHOTRIPSY WITH CYSTOSCOPY, STENT PLACEMENT;  Surgeon: Dex Jane DO;  Location: [facility]                Social History     Socioeconomic History    Marital status: Single   Tobacco Use    Smoking status: Never    Smokeless tobacco: Never   Vaping Use    Vaping status: Never Used   Substance and Sexual Activity    Alcohol use: Yes     Comment: ocassionally    Drug use: Yes     Frequency: 7.0 times per week     Types: Cannabis     Comment: medical     Social Drivers of Health     Food Insecurity: No Food Insecurity (7/14/2025)    NCSS - Food Insecurity     Worried About Running Out of Food in the Last Year: No     Ran Out of Food in the Last Year: No   Transportation Needs: No Transportation Needs (7/14/2025)    NCSS - Transportation     Lack of Transportation: No   Housing Stability: Not At Risk (7/14/2025)    NCSS - Housing/Utilities     Has Housing: Yes     Worried About Losing Housing: No     Unable to Get Utilities: No                                Physical Exam    ED Triage Vitals [07/22/25 0205]   BP (!) 134/92   Pulse 88   Resp 18   Temp 98.7 °F (37.1 °C)   Temp src    SpO2 99 %   O2 Device None (Room air)       Current Vitals:   Vital Signs  BP: 125/77  Pulse: 65  Resp: 18  Temp: 98.7 °F (37.1 °C)  MAP (mmHg): 91    Oxygen Therapy  SpO2: 96 %  O2 Device: None (Room  air)            Physical Exam  Vitals and nursing note reviewed.   Constitutional:       General: He is not in acute distress.     Appearance: He is well-developed. He is not ill-appearing, toxic-appearing or diaphoretic.      Comments: Adult male appears uncomfortable   HENT:      Head: Normocephalic and atraumatic.   Eyes:      Extraocular Movements: Extraocular movements intact.      Pupils: Pupils are equal, round, and reactive to light.   Cardiovascular:      Rate and Rhythm: Normal rate and regular rhythm.      Heart sounds: Normal heart sounds.   Pulmonary:      Effort: Pulmonary effort is normal.      Breath sounds: Normal breath sounds.   Abdominal:      General: Bowel sounds are normal. There is no distension.      Palpations: Abdomen is soft.      Tenderness: There is generalized abdominal tenderness. There is no guarding or rebound.   Genitourinary:     Comments: Nephrostomy tube with clear urine  Skin:     General: Skin is warm.      Capillary Refill: Capillary refill takes less than 2 seconds.   Neurological:      General: No focal deficit present.      Mental Status: He is alert and oriented to person, place, and time.   Psychiatric:         Mood and Affect: Mood normal.                 ED Course  Labs Reviewed   URINALYSIS WITH CULTURE REFLEX - Abnormal; Notable for the following components:       Result Value    Blood Urine Trace (*)     Leukocyte Esterase Urine 75 (*)     WBC Urine 11-20 (*)     RBC Urine 6-10 (*)     Squamous Epi. Cells Few (*)     All other components within normal limits   COMP METABOLIC PANEL (14) - Normal   CBC WITH DIFFERENTIAL WITH PLATELET   URINE CULTURE, ROUTINE          CT scan shows there is a left double-J ureteral stent in the proximal loop of the left kidney upper pole calyx and distal loop in the urinary bladder there is also a left nephrostomy tube with a loop in the lower pole calyx.  There is a stone material in the left kidney calyces and there is moderate  left-sided urethral thickening that may be related to stent or infection.  Correlate with urinalysis stone fragments along the proximal left ureter measuring 4 mm unchanged from previous.  Right kidney is normal no evidence of diverticulitis or colitis normal appendix no bowel obstruction no free air no concerning bone findings bilateral L5 pars defect with grade 1 anterolisthesis.                  MDM     Social -negative tobacco, negative etoh, positive marijuana drugs  Family History-noncontributory  Past Medical History-congenital obstruction of ureter, kidney stones, anxiety, depression    Differential diagnosis before testing included kidney stone, pyelonephritis, UTI, viral syndrome, bowel obstruction    Co-morbidities that add to the complexity of management include: Patient has indwelling ureteral stent and a nephrostomy tube    Testing ordered during this visit included CT scan baseline labs urinalysis    Radiographic images  I personally reviewed the radiographs and my individual interpretation shows ureteral stent in place, left nephrostomy tube, urethral thickening  I also reviewed the official reports that showed CT scan shows there is a left double-J ureteral stent in the proximal loop of the left kidney upper pole calyx and distal loop in the urinary bladder there is also a left nephrostomy tube with a loop in the lower pole calyx.  There is a stone material in the left kidney calyces and there is moderate left-sided urethral thickening that may be related to stent or infection.  Correlate with urinalysis stone fragments along the proximal left ureter measuring 4 mm unchanged from previous.  Right kidney is normal no evidence of diverticulitis or colitis normal appendix no bowel obstruction no free air no concerning bone findings bilateral L5 pars defect with grade 1 anterolisthesis.    External chart review showed review of Care Everywhere in epic system shows during most recent hospitalization on  14 July patient had been treated with Unasyn initially and sent home on Augmentin.  Patient had stent placement by [facility].    History obtained by an independent source included from patient    Discussion of management with patient    Social determinants of health that affect care include not applicable      Medications Provided:  Toradol Zofran IV fluids    Course of Events during Emergency Room Visit include 34-year-old male presents emergency room with left-sided flank pain.  Patient has had recurrent pain worsening again today.  Home [place] did not help with the pain.  His urinalysis still shows UTI.  He had pain on Augmentin which she still taking is still 4 more days.  Most recent urine culture from the 14th shows he grew out Enterococcus but not VRE.  Will get a CT scan urinalysis baseline labs.  His urinalysis still shows UTI.    Patient still having pain.  Will give an additional round of pain medication will discuss patient's CT scan and urinalysis with urology he does not have an elevated white count but still has WBCs in his urine with leuk esterase of 75.    CT scan discussed with urology.  Will restart the Unasyn that the patient was on previously.  She will see the patient this morning.  I will speak with the hospitalist regarding this patient      Patient discussed with [facility] hospitalist will plan for admission for pain control  Disposition:    Admission  I have discussed with the patient the results of test, differential diagnosis, and treatment plan. They expressed clear understanding of these instructions and agrees to the plan provided.       Admission disposition: 7/22/2025  5:06 AM           Medical Decision Making      Disposition and Plan     Clinical Impression:  1. Left flank pain    2. Acute UTI    3. H/O insertion of nephrostomy tube    4. Ureteral stent present    5. Intractable pain         Disposition:  Admit  7/22/2025  5:06 am    Follow-up:  No follow-up provider  specified.        Medications Prescribed:  Current Discharge Medication List                Supplementary Documentation:         Hospital Problems       Present on Admission  Date Reviewed: 5/15/2025          ICD-10-CM Noted POA    * (Principal) Left flank pain R10.9 6/23/2025 Unknown

## 2025-07-22 NOTE — ED QUICK NOTES
Orders for admission, patient is aware of plan and ready to go upstairs. Any questions, please call ED RN Quin at extension [phone].     Patient Covid vaccination status: Fully vaccinated     COVID Test Ordered in ED: None    COVID Suspicion at Admission: N/A    Running Infusions: Medication Infusions[1] None    Mental Status/LOC at time of transport: a/ox4    Other pertinent information: Patient has stent in L kidney  CIWA score: N/A   NIH score:  N/A             [1]

## 2025-07-22 NOTE — H&P
[facility]     Austin Mcfarland Patient Status:  Emergency    1990 MRN [id number]   Location [facility] EMERGENCY DEPARTMENT Attending No att. 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 He was last hospitalized from - for pain with enterococcus UTI and was treated with Unasyn and then Augmentin on DC. He was doing well until yesterday morning when dysuria started. He took some pyridium with initial improvement. Then he developed a burning and punching pain in his left flank that radiated into his groin rated 8/10. It was worse with urination and improved with pain medication. He also noticed chills and dysuria with nausea and a few episodes of emesis. His pain felt similar to his prior kidney stones and he was worried about becoming dehydrated so he decided to seek medical care.    History/Other:    Past Medical History:  Past Medical History[1]  Past Surgical History:   Past Surgical History[2]   Family History:   Family History[3]  Social History:    reports that he has never smoked. He has never used smokeless tobacco. He reports current alcohol use. He reports current drug use. Frequency: 7.00 times per week. Drug: Cannabis.     Allergies: Allergies[4]    Medications:  Medications Ordered Prior to Encounter[5]    Review of Systems:   A comprehensive review of systems was completed.    Pertinent positives and negatives noted in the HPI.    Objective:   Physical Exam:    /84   Pulse 67   Temp 98.7 °F (37.1 °C)   Resp 18   Ht 6' 1\" (1.854 m)   Wt 170 lb (77.1 kg)   SpO2 99%   BMI 22.43 kg/m²   General: No acute distress, Alert  Respiratory: No rhonchi, no wheezes  Cardiovascular: S1, S2. Regular rate and  rhythm  Abdomen: Soft, tenderness to palpation of LUQ/LLQ, non-distended, positive bowel sounds  Neuro: No new focal deficits  Extremities: No edema      Results:    Labs:      Labs Last 24 Hours:    Recent Labs   Lab 07/22/25  0305   RBC 4.86   HGB 14.9   HCT 42.1   MCV 86.6   MCH 30.7   MCHC 35.4   RDW 12.3   NEPRELIM 3.12   WBC 5.7   .0       Recent Labs   Lab 07/22/25  0305   GLU 90   BUN 10   CREATSERUM 1.12   EGFRCR 89   CA 9.2   ALB 4.8      K 4.0      CO2 28.0   ALKPHO 84   AST 24   ALT 20   BILT 0.3   TP 7.7       Estimated Glomerular Filtration Rate: 89 mL/min/1.73m2 (result from lab).    Lab Results   Component Value Date    INR 1.13 06/26/2025       No results for input(s): \"TROP\", \"TROPHS\", \"CK\" in the last 168 hours.    No results for input(s): \"TROP\", \"PBNP\" in the last 168 hours.    No results for input(s): \"PCT\" in the last 168 hours.    Imaging: Imaging data reviewed in Epic.    Assessment & Plan:      #Enterococcus UTI  #intractable flank pain  #hx congential obstruction of left UPJ  -UA showing trace blood, 75 leuk est, 11-20 WBC, 6-10 RBC, few epi cells  -CT showing left ureteral stent and left nephrostomy with stone material in left calyces and mod left sided urothelial thickening, stone fragment along left proximal ureter 4mm unchanged from prior  -met 0 SIRS criteria  -pain control: dilaudid prn, toradol prn tylenol  -cont home oxybutynin  -urology c/s in ED: admit for pain control and Unasyn      Plan of care discussed with ED physician    Maura Mendez DO    Supplementary Documentation:     The 21st Century Cures Act makes medical notes like these available to patients in the interest of transparency. Please be advised this is a medical document. Medical documents are intended to carry relevant information, facts as evident, and the clinical opinion of the practitioner. The medical note is intended as peer to peer communication and may appear blunt or direct. It is  written in medical language and may contain abbreviations or verbiage that are unfamiliar.                                       [1]   Past Medical History:   Anxiety    Calculus of kidney    Congenital obstruction of ureteropelvic junction    Depression    Renal disorder    congenital left ureter stricture    [2]   Past Surgical History:  Procedure Laterality Date    Cystoscopy,+ureteroscopy Left 07/31/2010    s/p left rpg, left urs, left renoscopy, cysto, with stent placement 7-31-10 at [facility], Dr John Sheffield    Cystoscopy,+ureteroscopy Left 12/03/2010    L URS, Dr. Sheffield    Cystoscopy,+ureteroscopy Left     Cysto, URS, RPG, stent Bladder Stone removal-Dr Wilson    Cystoscopy,+ureteroscopy Left 03/19/2015    no stent placed, [facility]    Cystoscopy,insert ureteral stent  04/22/2014    Procedure: LITHOTRIPSY WITH CYSTOSCOPY, STENT PLACEMENT;  Surgeon: Dex Jane DO;  Location: [facility]    Cystoscopy,remv calculus,simple  12/27/2007    Performed by JOHN SHEFFIELD at [facility], [facility]    Cystoscopy,remv calculus,simple  05/23/2008    Performed by JOHN SHEFFIELD at [facility]    Cystourethroscopy Left 12/27/2010    cysto stent removal- Dr. Sheffield    Cystourethroscopy Left 12/19/2014    Cysto Stent Removal-Dr Jane    Cystourethroscopy,ureter catheter  03/10/2008    Performed by JOHN SHEFFIELD at [facility], [facility]    Cystourethroscopy,ureter catheter  04/22/2014    Procedure: LITHOTRIPSY WITH CYSTOSCOPY, STENT PLACEMENT;  Surgeon: Dex Jane DO;  Location: [facility]    Fragmenting of kidney stone  09/25/2007    Performed by JOHN SHEFFIELD at [facility]    Fragmenting of kidney stone  09/25/2007    Performed by JOHN SHEFFIELD at [facility]    Fragmenting of kidney stone  04/22/2014    Procedure: LITHOTRIPSY WITH CYSTOSCOPY, STENT PLACEMENT;  Surgeon: Dex Jane DO;  Location: [facility]    Ir  nephhrostomy tube  2007    Cysto stent removal, nephrostomy tube placement    Laparoscopy, surgical; pyeloplasty  10/11/2007    Left UPJ repair    Lithotripsy Left 06/21/2007    Left ESWL    Other surgical history  10/08/2016    Cysto, Lt RPG, Lt URS & Nephroscopy, Stone Manipulation, Stone Basket & Removal, Stent Placement-Dr. Calderon     Other surgical history      left pcnl cdh    Other surgical history      stent placement cdh    Other surgical history  12/05/2016    cysto stent removal dr calderon    Other surgical history  12/11/2020    Cysto/stent removal Dr. Su    Other surgical history  06/25/2021    CYSTOSCOPY, LEFT DIAGNOSTIC URETEROSCOPY, LEFT RETROGRADE PYELOGRAM, LEFT URETERAL STENT PLACEMENT,    Other surgical history  06/11/2021    CYSTOSCOPY,LEFT URETEROSCOPY,, RETROGRADE PYELOGRAM, LEFT STENT INSERTION    Other surgical history  01/21/2022    Cysto, Lt URS, RPHG, LL, Stone Extraction, Lt Stent Placement - Dr. Steel    Other surgical history  02/03/2022    Cysto Stent Removal- Dr. Steel    Renal endoscopy  03/10/2008    Performed by DEBORAH CALDERON at [facility]    Special service or report  05/09/2007    Cysto, stone extraction    Special service or report  09/20/2006    Neph tube insertion with endopyelotomy    Special service or report  10/21/2007    Larger ureteral stent placed    Urology surgery procedure unlisted  05/23/2008    Performed by DEBORAH CALDERON at [facility]    X-ray antegrade pyelogram tube  03/10/2008    Performed by DEBORAH CALDERON at [facility]    X-ray retrograde pyelogram  03/10/2008    Performed by DEBORAH CALDERON at [facility]    X-ray retrograde pyelogram  05/23/2008    Performed by DEBORAH CALDERON at [facility]    X-ray retrograde pyelogram  04/22/2014    Procedure: LITHOTRIPSY WITH CYSTOSCOPY, STENT PLACEMENT;  Surgeon: Dex Jane DO;  Location: [facility]   [3] No family history on  file.  [4] No Known Allergies  [5]   Current Facility-Administered Medications on File Prior to Encounter   Medication Dose Route Frequency Provider Last Rate Last Admin    [COMPLETED] ketorolac (Toradol) 15 MG/ML injection 15 mg  15 mg Intravenous Once Richard Lucas MD   15 mg at 07/14/25 1258    [COMPLETED] HYDROmorphone (Dilaudid) 1 MG/ML injection 0.5 mg  0.5 mg Intravenous Q30 Min PRN Richard Lucas MD   0.5 mg at 07/14/25 1710    [COMPLETED] ondansetron (Zofran) 4 MG/2ML injection 4 mg  4 mg Intravenous Once Richard Lucas MD   4 mg at 07/14/25 1302    [COMPLETED] cefTRIAXone (Rocephin) 1 g in sodium chloride 0.9% 100 mL IVPB-ADDV  1 g Intravenous Once Richard Lucas MD   Stopped at 07/14/25 1411    [COMPLETED] lidocaine (Xylocaine) 1 % injection             [COMPLETED] fentaNYL (Sublimaze) 50 mcg/mL injection             [COMPLETED] midazolam (Versed) 2 MG/2ML injection             [COMPLETED] fentaNYL (Sublimaze) 50 mcg/mL injection             [COMPLETED] midazolam (Versed) 2 MG/2ML injection             [COMPLETED] iopamidol (ISOVUE-300) 61 % injection 30 mL  30 mL Intravascular ONCE PRN William Barragan MD   15 mL at 06/26/25 1118    [COMPLETED] sodium chloride 0.9 % IV bolus 1,000 mL  1,000 mL Intravenous Once Harrison Bowens MD   Stopped at 06/25/25 1438    [COMPLETED] ondansetron (Zofran) 4 MG/2ML injection 4 mg  4 mg Intravenous Once Harrison Bowens MD   4 mg at 06/25/25 1338    [COMPLETED] morphINE PF 4 MG/ML injection 4 mg  4 mg Intravenous Once Harrison Bowens MD   4 mg at 06/25/25 1341    [COMPLETED] ketorolac (Toradol) 30 MG/ML injection 30 mg  30 mg Intravenous Once Harrison Bowens MD   30 mg at 06/25/25 1345    [COMPLETED] morphINE PF 4 MG/ML injection 4 mg  4 mg Intravenous Once Harrison Bowens MD   4 mg at 06/25/25 1457    [COMPLETED] lidocaine (Xylocaine) 1 % 116 mg in sodium chloride 0.9% 100 mL IVPB  1.5 mg/kg Intravenous Once Harrison Bowens MD   Stopped at 06/25/25 1897     [COMPLETED] metoclopramide (Reglan) 5 mg/mL injection 5 mg  5 mg Intravenous Once Harrison Bowens MD   5 mg at 06/25/25 1714    [COMPLETED] ondansetron (Zofran) 4 MG/2ML injection 4 mg  4 mg Intravenous Once Roula Donato MD   4 mg at 06/23/25 0640    [COMPLETED] sodium chloride 0.9 % IV bolus 1,000 mL  1,000 mL Intravenous Once Roula Donato MD   Stopped at 06/23/25 0750    [COMPLETED] HYDROmorphone (Dilaudid) 1 MG/ML injection 0.5 mg  0.5 mg Intravenous Once Roula Donato MD   0.5 mg at 06/23/25 0645    [COMPLETED] iopamidol 76% (ISOVUE-370) injection for power injector  100 mL Intravenous ONCE PRN Roula Donato MD   100 mL at 06/23/25 0726    [COMPLETED] ketorolac (Toradol) 15 MG/ML injection 15 mg  15 mg Intravenous Once Roula Donato MD   15 mg at 06/23/25 0748    [COMPLETED] HYDROmorphone (Dilaudid) 1 MG/ML injection 1 mg  1 mg Intravenous Once Roula Donato MD   1 mg at 06/23/25 0910    [COMPLETED] HYDROmorphone (Dilaudid) 1 MG/ML injection 1 mg  1 mg Intravenous Once Roula Donato MD   1 mg at 06/23/25 1137    [COMPLETED] ceFAZolin (Ancef) 2g in 10mL IV syringe premix  2 g Intravenous On Call to OR Monse Caldwell PA   2 g at 06/23/25 1830    [COMPLETED] ketorolac (Toradol) 15 MG/ML injection 15 mg  15 mg Intravenous Once Serjio Perdomo MD   15 mg at 06/16/25 1409    [COMPLETED] sodium chloride 0.9 % IV bolus 500 mL  500 mL Intravenous Once Serjio Perdomo MD   Stopped at 06/16/25 1550    [COMPLETED] HYDROmorphone (Dilaudid) 1 MG/ML injection 1 mg  1 mg Intravenous Once Serjio Perdomo MD   1 mg at 06/16/25 1409    [COMPLETED] HYDROmorphone (Dilaudid) 1 MG/ML injection 1 mg  1 mg Intravenous Once Serjio Perdomo MD   1 mg at 06/16/25 1548    [COMPLETED] heparin (Porcine) 5000 UNIT/ML injection 5,000 Units  5,000 Units Subcutaneous Once Charlene Johnson MD   5,000 Units at 05/23/25 1311    [COMPLETED] ceFAZolin (Ancef) 2g in 10mL IV syringe premix  2 g Intravenous Once Charlene Johnson MD   2 g at  05/23/25 1328    [COMPLETED] ceFAZolin (Ancef) 2g in 10mL IV syringe premix  2 g Intravenous Q8H Charlene Johnson MD   Stopped at 05/24/25 0706    [COMPLETED] ondansetron (Zofran) 4 MG/2ML injection             [COMPLETED] HYDROmorphone (Dilaudid) 1 MG/ML injection              Current Outpatient Medications on File Prior to Encounter   Medication Sig Dispense Refill    HYDROcodone-acetaminophen 5-325 MG Oral Tab Take 1 tablet by mouth every 4 (four) hours as needed for Pain. 15 tablet 0    amoxicillin clavulanate 875-125 MG Oral Tab Take 1 tablet by mouth 2 (two) times daily for 10 days. 20 tablet 0    oxybutynin 5 MG Oral Tab Take 1 tablet (5 mg total) by mouth 4 (four) times daily as needed (bladder spasms). 30 tablet 0    Naloxone HCl 4 MG/0.1ML Nasal Liquid 4 mg by Nasal route as needed. If patient remains unresponsive, repeat dose in other nostril 2-5 minutes after first dose. 1 kit 0    acetaminophen 500 MG Oral Tab Take 1-2 tablets (500-1,000 mg total) by mouth every 4 (four) hours as needed for Pain.

## 2025-07-22 NOTE — OPERATIVE REPORT
[facility]    Operative Report    Date of procedure: 7/22/2025    Austin Mcfarland  11/26/1990  [id number]    PREOPERATIVE DIAGNOSIS:  Retained left ureteral stent, left ureteral calculus.     POSTOPERATIVE DIAGNOSIS:  Retained left ureteral stent, left ureteral calculus.     PROCEDURE PERFORMED:       Cystoscopy, left retrograde pyelogram, left ureteroscopy, left ureteral stent removal  Intraoperative interpretation of fluoroscopy.     SURGEON:  Dex Jane DO.     ANESTHESIA:  General.     ESTIMATED BLOOD LOSS:  None.     SPECIMENS:  None    FINDINGS: No left hydronephrosis, no left ureteral stent encrustation. There was a 5 mm diverticulum in the proximal ureter near the UPJ with sand particles within it.    DRAINS:  None placed - left nephrostomy tube left in place     COMPLICATIONS:  None.     PROCEDURE SUMMARY:  After the appropriate informed consent was obtained and in the chart, the patient was given preoperative antibiotics, taken to the cystoscopy suite, placed on the cystoscopy table in supine position. After bilateral sequential compression devices had been applied and satisfactory general anesthesia had been achieved, the patient's legs were raised to a dorsal lithotomy position. The patient's groin was prepped and draped in the usual sterile fashion. A well-lubricated 22-[place] rigid cystoscope was then introduced through a normal anterior male urethra, through a prostatic fossa demonstrative of no obstruction, and into the bladder. Upon entering the bladder, the bilateral ureteral orifices were seen in normal expected anatomic position. The bladder was drained and refilled with sterile irrigant. Systematic examination of the bladder showed no extrinsic pressure defect, no neoplasm, no diverticulum, and 1+ trabeculation. There was a stent extruding from the left ureteral orifice which was grasped and pulled to the urethral meatus where it was cannulated with a 0.035 in glide wire. The wire  was seen to coil in the left renal pelvis. The stent was removed and seen to be in entirety with no encrustation. A flexible ureteroscope was passed over the glidewire into the distal ureter and the wire was removed. The ureteroscope was advanced to the proximal ureter where the small diverticulum was seen at the left anterior side. Thee were sand particles in the diverticulum which were flushed out but there was nothing large enough to extract with the basket. The ureteroscope was advance into the collecting system and the nephrostomy tube was seen in good position and there were no calculi seen. The ureteroscope was then slowly withdrawn re-examining the ureter on the way out showing no calculus and no trauma. A retrograde pyelogram was performed showing no contrast extravasation and no hydronephrosis. The ureteroscope was removed. Lidocaine 2% jelly was placed in the urethra, and the procedure was terminated. The patient tolerated the procedure well, was extubated in the operating room and transferred to the postanesthesia care unit in stable condition with no immediately apparent complications.       Dex Jane D.O.  [facility]

## 2025-07-22 NOTE — ANESTHESIA PREPROCEDURE EVALUATION
PRE-OP EVALUATION    Patient Name: Austin Mcfarland    Admit Diagnosis: Left flank pain [R10.9]  Acute UTI [N39.0]  Intractable pain [R52]  Ureteral stent present [Z96.0]  H/O insertion of nephrostomy tube [Z98.890]    Pre-op Diagnosis: Calculi, ureter [N20.1]    CYSTOSCOPY, LEFT URETEROSCOPY WITH POSSIBLE STONE EXTRACTION, POSSIBLE LEFT RETROGRADE PYELOGRAM, REMOVAL OF LEFT URETERAL STENT    Anesthesia Procedure: CYSTOSCOPY, LEFT URETEROSCOPY WITH POSSIBLE STONE EXTRACTION, POSSIBLE LEFT RETROGRADE PYELOGRAM, REMOVAL OF LEFT URETERAL STENT (Left)    Surgeons and Role:     * Dex Jane, DO - Primary    Pre-op vitals reviewed.  Temp: 97.7 °F (36.5 °C)  Pulse: 59  Resp: 16  BP: 117/75  SpO2: 99 %  Body mass index is 22.43 kg/m².    Current medications reviewed.  Hospital Medications:  Current Medications[1]    Outpatient Medications:   Prescriptions Prior to Admission[2]    Allergies: Patient has no known allergies.      Anesthesia Evaluation    Patient summary reviewed.    Anesthetic Complications           GI/Hepatic/Renal    Negative GI/hepatic/renal ROS.                             Cardiovascular    Negative cardiovascular ROS.  ECG reviewed.                                                 Endo/Other    Negative endo/other ROS.                              Pulmonary    Negative pulmonary ROS.                       Neuro/Psych      (+) depression                        Patient Active Problem List:     Ureteropelvic junction obstruct, congenital     Recurrent kidney stones     Hydronephrosis of left kidney     Flank pain     Hydronephrosis, unspecified hydronephrosis type     Retroperitoneal fibrosis     Ureteral stent retained     Left flank pain     Ureteral colic     Ureteral stricture, left     Other chronic pain     Congenital obstruction of ureteropelvic junction     Recurrent nephrolithiasis     Acute cystitis with hematuria     Ureteral stent present     Acute UTI     H/O insertion of nephrostomy  tube     Intractable pain            Past Surgical History[3]  Social Hx on file[4]  History   Drug Use    Frequency: 7.0 times per week    Types: Cannabis     Comment: medical     Available pre-op labs reviewed.  Lab Results   Component Value Date    WBC 5.7 07/22/2025    RBC 4.86 07/22/2025    HGB 14.9 07/22/2025    HCT 42.1 07/22/2025    MCV 86.6 07/22/2025    MCH 30.7 07/22/2025    MCHC 35.4 07/22/2025    RDW 12.3 07/22/2025    .0 07/22/2025     Lab Results   Component Value Date     07/22/2025    K 4.0 07/22/2025     07/22/2025    CO2 28.0 07/22/2025    BUN 10 07/22/2025    CREATSERUM 1.12 07/22/2025    GLU 90 07/22/2025    CA 9.2 07/22/2025            Airway      Mallampati: II  Mouth opening: >3 FB  TM distance: > 6 cm  Neck ROM: full Cardiovascular    Cardiovascular exam normal.         Dental    Dentition appears grossly intact         Pulmonary    Pulmonary exam normal.                 Other findings              ASA: 2   Plan: general  NPO status verified and patient meets guidelines.        Comment:  I explained intrinsic risks of general anesthesia, including nausea, dental damage, sore throat, mouth injury,and hoarseness from airway management.  All questions were answered and understanding was demonstrated of risks.  Informed permission was obtained to proceed as documented in the signed consent form.        Plan/risks discussed with: patient                Present on Admission:  **None**             [1]    [COMPLETED] ketorolac (Toradol) 15 MG/ML injection 15 mg  15 mg Intravenous Once    [COMPLETED] HYDROmorphone (Dilaudid) 1 MG/ML injection 1 mg  1 mg Intravenous Once    [COMPLETED] ondansetron (Zofran) 4 MG/2ML injection 4 mg  4 mg Intravenous Once    [COMPLETED] sodium chloride 0.9 % IV bolus 1,000 mL  1,000 mL Intravenous Once    [COMPLETED] HYDROmorphone (Dilaudid) 1 MG/ML injection 1 mg  1 mg Intravenous Once    [COMPLETED] ampicillin-sulbactam (Unasyn) 3 g in sodium  chloride 0.9% 100mL IVPB-PATRICK  3 g Intravenous Once    [COMPLETED] ketorolac (Toradol) 15 MG/ML injection 15 mg  15 mg Intravenous Once    ketorolac (Toradol) 15 MG/ML injection 15 mg  15 mg Intravenous Q6H PRN    Or    ketorolac (Toradol) 30 MG/ML injection 30 mg  30 mg Intravenous Q6H PRN    HYDROmorphone (Dilaudid) 1 MG/ML injection 0.5 mg  0.5 mg Intravenous Q4H PRN    ampicillin-sulbactam (Unasyn) 3 g in sodium chloride 0.9% 100mL IVPB-PATRICK  3 g Intravenous Q6H    sodium chloride 0.9% infusion   Intravenous Continuous    acetaminophen (Tylenol Extra Strength) tab 500 mg  500 mg Oral Q4H PRN    melatonin tab 3 mg  3 mg Oral Nightly PRN    polyethylene glycol (PEG 3350) (Miralax) 17 g oral packet 17 g  17 g Oral Daily PRN    sennosides (Senokot) tab 17.2 mg  17.2 mg Oral Nightly PRN    bisacodyl (Dulcolax) 10 MG rectal suppository 10 mg  10 mg Rectal Daily PRN    fleet enema (Fleet) rectal enema 133 mL  1 enema Rectal Once PRN    ondansetron (Zofran) 4 MG/2ML injection 4 mg  4 mg Intravenous Q6H PRN    prochlorperazine (Compazine) 10 MG/2ML injection 5 mg  5 mg Intravenous Q8H PRN    benzonatate (Tessalon) cap 200 mg  200 mg Oral TID PRN    glycerin-hypromellose- (Artificial Tears) 0.2-0.2-1 % ophthalmic solution 1 drop  1 drop Both Eyes QID PRN    sodium chloride (Saline Mist) 0.65 % nasal solution 1 spray  1 spray Each Nare Q3H PRN    [COMPLETED] HYDROmorphone (Dilaudid) 1 MG/ML injection 1 mg  1 mg Intravenous Once   [2]   Medications Prior to Admission   Medication Sig Dispense Refill Last Dose/Taking    HYDROcodone-acetaminophen 5-325 MG Oral Tab Take 1 tablet by mouth every 4 (four) hours as needed for Pain. 15 tablet 0 7/22/2025 Morning    amoxicillin clavulanate 875-125 MG Oral Tab Take 1 tablet by mouth 2 (two) times daily for 10 days. 20 tablet 0 7/21/2025    oxybutynin 5 MG Oral Tab Take 1 tablet (5 mg total) by mouth 4 (four) times daily as needed (bladder spasms). 30 tablet 0 7/21/2025     acetaminophen 500 MG Oral Tab Take 1-2 tablets (500-1,000 mg total) by mouth every 4 (four) hours as needed for Pain.   Past Week    Naloxone HCl 4 MG/0.1ML Nasal Liquid 4 mg by Nasal route as needed. If patient remains unresponsive, repeat dose in other nostril 2-5 minutes after first dose. 1 kit 0    [3]   Past Surgical History:  Procedure Laterality Date    Cystoscopy,+ureteroscopy Left 07/31/2010    s/p left rpg, left urs, left renoscopy, cysto, with stent placement 7-31-10 at [facility], Dr John Sheffield    Cystoscopy,+ureteroscopy Left 12/03/2010    L URS, Dr. Sheffield    Cystoscopy,+ureteroscopy Left     Cysto, URS, RPG, stent Bladder Stone removal-Dr Wilson    Cystoscopy,+ureteroscopy Left 03/19/2015    no stent placed, [facility]    Cystoscopy,insert ureteral stent  04/22/2014    Procedure: LITHOTRIPSY WITH CYSTOSCOPY, STENT PLACEMENT;  Surgeon: Dex Jane DO;  Location: [facility]    Cystoscopy,remv calculus,simple  12/27/2007    Performed by JOHN SHEFFIELD at [facility]    Cystoscopy,remv calculus,simple  05/23/2008    Performed by JOHN SHEFFIELD at [facility]    Cystourethroscopy Left 12/27/2010    cysto stent removal- Dr. Sheffield    Cystourethroscopy Left 12/19/2014    Cysto Stent Removal-Dr Jane    Cystourethroscopy,ureter catheter  03/10/2008    Performed by JOHN SHEFFIELD at [facility]    Cystourethroscopy,ureter catheter  04/22/2014    Procedure: LITHOTRIPSY WITH CYSTOSCOPY, STENT PLACEMENT;  Surgeon: Dex Jane DO;  Location: [facility]    Fragmenting of kidney stone  09/25/2007    Performed by JOHN SHEFFIELD at [facility]    Fragmenting of kidney stone  09/25/2007    Performed by JOHN SHEFFIELD at [facility]    Fragmenting of kidney stone  04/22/2014    Procedure: LITHOTRIPSY WITH CYSTOSCOPY, STENT PLACEMENT;  Surgeon: Dex Jane DO;  Location: [facility]    Ir nephhrostomy  tube  2007    Cysto stent removal, nephrostomy tube placement    Laparoscopy, surgical; pyeloplasty  10/11/2007    Left UPJ repair    Lithotripsy Left 06/21/2007    Left ESWL    Other surgical history  10/08/2016    Cysto, Lt RPG, Lt URS & Nephroscopy, Stone Manipulation, Stone Basket & Removal, Stent Placement-Dr. Calderon     Other surgical history      left pcnl cdh    Other surgical history      stent placement cdh    Other surgical history  12/05/2016    cysto stent removal dr calderon    Other surgical history  12/11/2020    Cysto/stent removal Dr. Su    Other surgical history  06/25/2021    CYSTOSCOPY, LEFT DIAGNOSTIC URETEROSCOPY, LEFT RETROGRADE PYELOGRAM, LEFT URETERAL STENT PLACEMENT,    Other surgical history  06/11/2021    CYSTOSCOPY,LEFT URETEROSCOPY,, RETROGRADE PYELOGRAM, LEFT STENT INSERTION    Other surgical history  01/21/2022    Cysto, Lt URS, RPHG, LL, Stone Extraction, Lt Stent Placement - Dr. Steel    Other surgical history  02/03/2022    Cysto Stent Removal- Dr. Steel    Renal endoscopy  03/10/2008    Performed by DEBORAH CALDERON at [facility]    Special service or report  05/09/2007    Cysto, stone extraction    Special service or report  09/20/2006    Neph tube insertion with endopyelotomy    Special service or report  10/21/2007    Larger ureteral stent placed    Urology surgery procedure unlisted  05/23/2008    Performed by DEBORAH CALDERON at [facility]    X-ray antegrade pyelogram tube  03/10/2008    Performed by DEBORAH CALDERON at [facility]    X-ray retrograde pyelogram  03/10/2008    Performed by DEBORAH CALDERON at [facility]    X-ray retrograde pyelogram  05/23/2008    Performed by DEBORAH CALDERON at [facility]    X-ray retrograde pyelogram  04/22/2014    Procedure: LITHOTRIPSY WITH CYSTOSCOPY, STENT PLACEMENT;  Surgeon: Dex Jane DO;  Location: [facility]   [4]   Social History  Socioeconomic  History    Marital status: Single   Tobacco Use    Smoking status: Never    Smokeless tobacco: Never   Vaping Use    Vaping status: Never Used   Substance and Sexual Activity    Alcohol use: Yes     Comment: ocassionally    Drug use: Yes     Frequency: 7.0 times per week     Types: Cannabis     Comment: medical

## 2025-07-22 NOTE — ANESTHESIA PROCEDURE NOTES
Airway  Date/Time: 7/22/2025 4:05 PM  Reason: elective      General Information and Staff   Patient location during procedure: OR  Anesthesiologist: Jaun Malave MD  Performed: anesthesiologist   Performed by: Jaun Malave MD  Authorized by: Jaun Malave MD        Indications and Patient Condition  Indications for airway management: anesthesia  Sedation level: deep      Preoxygenated: yesPatient position: sniffing    Mask difficulty assessment: 1 - vent by mask    Final Airway Details    Final airway type: supraglottic airway      Successful airway: classic  Size: 5     Number of attempts at approach: 1  Number of other approaches attempted: 0

## 2025-07-22 NOTE — ED INITIAL ASSESSMENT (HPI)
One of his stents in kidney is infected and needs to be taken out but the norco is not helping with pain

## 2025-07-22 NOTE — CONSULTS
[facility]  Department of Urology   Consultation Note    Austin Mcfarland Patient Status:  Inpatient    1990 MRN [id number]   Location [facility] 0SW-A Attending Maura Mendez, DO   Hosp Day # 0 PCP FRANK LOERA     Reason for Consultation:  Left abdominal/flank pain  UTI  Retained ureteral stent    History of Present Illness:  Austin Mcfarland is a a(n) 34 year old male.    Patient has a longstanding history of nephrolithiasis recurrent left ureteral stricture disease s/p prior buccal mucosal graft ureteroplasty, in the past as well as a more recent robotic ureterolysis with Dr. Johnson in May 2025.     Encrusted left ureteral stent   S/P cystoscopy, left ureteral stent removal, left retrograde ureteropyelogram 25 with Dr. Johnson.       Recent admission on  for flank pain and left hydronephrosis. He was found to have moderate/severe left hydronephrosis and mucosal thickening.  He underwent cystoscopy, left retrograde pyelogram, and left ureteral stent placement with Dr. Toth on .      Operating findings suggested contrast did not drain well past the UPJ.  Stent was placed.      Admitted to [facility] 25 with left flank pain.       Underwent left NT 25 with IR  Discharged john paul on 25    Admitted to [facility] last week with left flank pain, UTI    Urine culture 25: >100K CFU/mL Enterococcus faecalis not VRE  2/2 blood cultures 25: no growth after 5 days  Abdominal/pelvic CT scan without contrast 25: There is a left percutaneous nephrostomy tube with interval decrease in left hydronephrosis.  There is a left ureteral stent. There are multiple nonobstructing calcifications in the left kidney. There is a 3 to 4 mm calcification within the left mid to proximal ureter adjacent to the stent.  Right kidney is unremarkable.   URINARY BLADDER: Normal. No visible focal wall thickening, lesion, or calculus.   -Note:   Buck reviewed CT scan - does not think he has stone within the ureter.     DC home with Augmentin with plans for outpatient stent removal.    Presented to ER with left abdominal/flank pain. Reports onset of pain yesterday.    +nausea, vomiting  No fever  +chills with pain  +dysuria  Unsure about hematuria - taking pyridium  Reports some intermittency  Feels like he is emptying his bladder    Labs:  WBC 5.7  Hgb 14.9  Platelet count 228  Serum creat 1.12    UA 7/22/25: 11-20 WBC/hpf, 6-10 RBC/hpf, no bacteria, few squamous epithelial cells  Urine culture 7/22/25: pending    Abdominal/pelvic CT scan without contrast 7/22/25:  Stable positioning of left-sided percutaneous nephrostomy and nephroureteral stent. Stable urothelial thickening of the left renal pelvis and proximal ureter which may represent an infectious or inflammatory process. Recommend correlation with   urinalysis. Stable 3 mm stone fragment within the left proximal ureter. No significant collecting system dilation.     Urology was consulted      History:  Past Medical History[1]  Past Surgical History[2]  Family History[3]   reports that he has never smoked. He has never used smokeless tobacco. He reports current alcohol use. He reports current drug use. Frequency: 7.00 times per week. Drug: Cannabis.    Allergies:  Allergies[4]    Medications:  Current Hospital Medications[5]    Review of Systems:  Pertinent items are noted in HPI.  10 point review of systems completed.    Physical Exam:  BP (!) 119/96 (BP Location: Right arm)   Pulse 68   Temp 97.7 °F (36.5 °C) (Oral)   Resp 18   Ht 6' 1\" (1.854 m)   Wt 170 lb (77.1 kg)   SpO2 97%   BMI 22.43 kg/m² \  GENERAL: the patient is resting in bed in no acute distress.    HEENT: Unremarkable.  NECK: Supple.   LUNGS: non-labored respirations.    ABDOMEN: The abdomen is soft with left abdominal/flank tenderness.    SKIN: Without stigmata.   NEUROLOGIC: Grossly intact.  EXTREMITIES: Without edema.       Laboratory Data:  Lab Results   Component Value Date    WBC 5.7 07/22/2025    HGB 14.9 07/22/2025    HCT 42.1 07/22/2025    .0 07/22/2025    CREATSERUM 1.12 07/22/2025    BUN 10 07/22/2025     07/22/2025    K 4.0 07/22/2025     07/22/2025    CO2 28.0 07/22/2025    GLU 90 07/22/2025    CA 9.2 07/22/2025    ALB 4.8 07/22/2025    ALKPHO 84 07/22/2025    BILT 0.3 07/22/2025    TP 7.7 07/22/2025    AST 24 07/22/2025    ALT 20 07/22/2025       Urine culture 7/14/25: >100K CFU/mL Enterococcus faecalis not VRE    2/2 blood cultures 7/14/25: no growth after 5 days    UA 7/22/25: 11-20 WBC/hpf, 6-10 RBC/hpf, no bacteria, few squamous epithelial cells  Urine culture 7/22/25: pending    Imaging:  Abdominal/pelvic CT scan without contrast 7/22/25:  FINDINGS:    KIDNEYS: Normal renal morphology. Percutaneous nephrostomy and nephroureteral stent of the left collecting system noted with relative collecting system decompression. Stable 3 mm stone fragment within the left proximal ureter (series 3 image 105).  Prominent urothelial thickening of the left renal pelvis and proximal ureter, similar to prior. Scattered nonobstructing calyceal calculi of the left kidney are stable. No obstructive uropathy of the right collecting system. Subcentimeter hyperdense  renal cortical lesion of the right midpole noted, too small to characterize.    URINARY BLADDER: Normal. No visible focal wall thickening, lesion, or calculus.    LIVER: No enlargement, atrophy, abnormal density, or significant focal lesion.    BILIARY: No visible dilatation or calcification.    PANCREAS: No lesion, fluid collection, ductal dilatation, or atrophy.    SPLEEN: No enlargement or focal lesion.    ADRENALS: No mass or enlargement.    AORTA/VASCULAR: Normal. No aneurysm or dissection.    RETROPERITONEUM: No mass or enlarged adenopathy.    BOWEL/MESENTERY: Normal. No visible mass, obstruction, or bowel wall thickening.    ABDOMINAL WALL: Normal. No  mass or hernia.    PELVIC NODES: Normal. No adenopathy.    PELVIC ORGANS: Normal. No visible mass. Pelvic organs appropriate for patient age.    BONES: Bilateral L5 pars defects without spondylolisthesis    LUNG BASES: No visible pulmonary or pleural disease.    OTHER: Negative.    Impression   CONCLUSION:      1. Stable positioning of left-sided percutaneous nephrostomy and nephroureteral stent. Stable urothelial thickening of the left renal pelvis and proximal ureter which may represent an infectious or inflammatory process. Recommend correlation with  urinalysis.  2. Stable 3 mm stone fragment within the left proximal ureter. No significant collecting system dilation.      Impression:  Problem List[6]    HISTORY OF RECURRENT UROLITHIASIS AND LEFT URETERAL STRICTURE  S/P prior buccal mucosal graft ureteroplasty, and more recent left ureterolysis with Dr. Johnson in May, 2025   S/P cystoscopy, left ureteral stent placement 6/23/25 with Dr. Toth  S/P left NT 6/26/25 with IR    UTI  Urine culture 7/14/25: >100K CFU/mL Enterococcus faecalis not VRE  2/2 blood cultures 7/14/25: no growth after 5 days  Abdominal/pelvic CT scan without contrast 7/14/25: There is a left percutaneous nephrostomy tube with interval decrease in left hydronephrosis.  There is a left ureteral stent. There are multiple nonobstructing calcifications in the left kidney. There is a 3 to 4 mm calcification within the left mid to proximal ureter adjacent to the stent.  Right kidney is unremarkable. URINARY BLADDER: Normal. No visible focal wall thickening, lesion, or calculus.   -Note: Dr. Jane reviewed CT scan - does not think he has stone within the ureter.     ADMITTED WITH LEFT ABDOMINAL/FLANK PAIN  RETAINED LEFT URETERAL STENT  Afebrile  No leukocytosis  Serum creat 1.12  UA 7/22/25: 11-20 WBC/hpf, 6-10 RBC/hpf, no bacteria, few squamous epithelial cells  Urine culture 7/22/25: pending  Abdominal/pelvic CT scan without contrast  7/22/25: as above    Recommendations:  Recommend cystoscopy, possible left retrograde pyelogram, and removal of left ureteral stent. Reviewed risks, benefits, alternatives, and complications of the procedure including but not limited to risk of anesthesia, bladder/ureteral injury, bleeding, infection/worsening infection, and post-op symptoms with the patient who understands and wishes to proceed.      NPO  Consent to be signed  Check final urine culture  Continue with abx  Follow labs, temp    Patient to follow-up at [facility] for a second opinion after his insurance switches over next month.      Above discussed with patient, nurse, Dr. Jane.     Thank you for allowing me to participate in the care of your patient.    Monse Caldwell PA-C  [facility]  7/22/2025  8:45 AM    Addendum:  Dr. Jane reviewed CT scan - likely has stone fragment next to stent.  Will add possible ureteroscopy to procedure for today.      Above discussed with patient, nurse, Dr. Jane.      RAFY Hebert  Urology         [1]   Past Medical History:   Anxiety    Calculus of kidney    Congenital obstruction of ureteropelvic junction    Depression    Renal disorder    congenital left ureter stricture    [2]   Past Surgical History:  Procedure Laterality Date    Cystoscopy,+ureteroscopy Left 07/31/2010    s/p left rpg, left urs, left renoscopy, cysto, with stent placement 7-31-10 at [facility], Dr John Sheffield    Cystoscopy,+ureteroscopy Left 12/03/2010    L URS, Dr. Sheffield    Cystoscopy,+ureteroscopy Left     Cysto, URS, RPG, stent Bladder Stone removal-Dr Wilson    Cystoscopy,+ureteroscopy Left 03/19/2015    no stent placed, [facility] DIONY    Cystoscopy,insert ureteral stent  04/22/2014    Procedure: LITHOTRIPSY WITH CYSTOSCOPY, STENT PLACEMENT;  Surgeon: Dex Jane DO;  Location: [facility], [facility]    Cystoscopy,remv calculus,simple  12/27/2007    Performed by JOHN SHEFFIELD at [facility]  [place], [facility]    Cystoscopy,remv calculus,simple  05/23/2008    Performed by DEBORAH CALDERON at [facility], [facility]    Cystourethroscopy Left 12/27/2010    cysto stent removal- Dr. Calderon    Cystourethroscopy Left 12/19/2014    Cysto Stent Removal-Dr Jane    Cystourethroscopy,ureter catheter  03/10/2008    Performed by DEBORAH CALDERON at [facility], [facility]    Cystourethroscopy,ureter catheter  04/22/2014    Procedure: LITHOTRIPSY WITH CYSTOSCOPY, STENT PLACEMENT;  Surgeon: Dex Jane DO;  Location: [facility], [facility]    Fragmenting of kidney stone  09/25/2007    Performed by DEBORAH CALDERON at [facility], [facility]    Fragmenting of kidney stone  09/25/2007    Performed by DEBORAH CALDERON at [facility], [facility]    Fragmenting of kidney stone  04/22/2014    Procedure: LITHOTRIPSY WITH CYSTOSCOPY, STENT PLACEMENT;  Surgeon: Dex Jane DO;  Location: [facility]    Ir nephhrostomy tube  2007    Cysto stent removal, nephrostomy tube placement    Laparoscopy, surgical; pyeloplasty  10/11/2007    Left UPJ repair    Lithotripsy Left 06/21/2007    Left ESWL    Other surgical history  10/08/2016    Cysto, Lt RPG, Lt URS & Nephroscopy, Stone Manipulation, Stone Basket & Removal, Stent Placement-Dr. Calderon     Other surgical history      left pcnl cdh    Other surgical history      stent placement cdh    Other surgical history  12/05/2016    cysto stent removal dr calderon    Other surgical history  12/11/2020    Cysto/stent removal Dr. Su    Other surgical history  06/25/2021    CYSTOSCOPY, LEFT DIAGNOSTIC URETEROSCOPY, LEFT RETROGRADE PYELOGRAM, LEFT URETERAL STENT PLACEMENT,    Other surgical history  06/11/2021    CYSTOSCOPY,LEFT URETEROSCOPY,, RETROGRADE PYELOGRAM, LEFT STENT INSERTION    Other surgical history  01/21/2022    Cysto, Lt URS, RPHG, LL, Stone Extraction, Lt Stent Placement - Dr. Steel    Other surgical history  02/03/2022    Cysto Stent Removal- Dr. Steel     Renal endoscopy  03/10/2008    Performed by DEBORAH KOHLI at [facility]    Special service or report  05/09/2007    Cysto, stone extraction    Special service or report  09/20/2006    Neph tube insertion with endopyelotomy    Special service or report  10/21/2007    Larger ureteral stent placed    Urology surgery procedure unlisted  05/23/2008    Performed by DEBORAH KOHLI at [facility]    X-ray antegrade pyelogram tube  03/10/2008    Performed by DEBORAH KOHLI at [facility]    X-ray retrograde pyelogram  03/10/2008    Performed by DEBORAH KOHLI at [facility]    X-ray retrograde pyelogram  05/23/2008    Performed by DEBORAH KOHLI at [facility]    X-ray retrograde pyelogram  04/22/2014    Procedure: LITHOTRIPSY WITH CYSTOSCOPY, STENT PLACEMENT;  Surgeon: Dex Jane DO;  Location: [facility]   [3] No family history on file.  [4] No Known Allergies  [5]   Current Facility-Administered Medications:     ketorolac (Toradol) 15 MG/ML injection 15 mg, 15 mg, Intravenous, Q6H PRN **OR** ketorolac (Toradol) 30 MG/ML injection 30 mg, 30 mg, Intravenous, Q6H PRN    HYDROmorphone (Dilaudid) 1 MG/ML injection 0.5 mg, 0.5 mg, Intravenous, Q4H PRN    ampicillin-sulbactam (Unasyn) 3 g in sodium chloride 0.9% 100mL IVPB-PATRICK, 3 g, Intravenous, Q6H    sodium chloride 0.9% infusion, , Intravenous, Continuous    acetaminophen (Tylenol Extra Strength) tab 500 mg, 500 mg, Oral, Q4H PRN    melatonin tab 3 mg, 3 mg, Oral, Nightly PRN    polyethylene glycol (PEG 3350) (Miralax) 17 g oral packet 17 g, 17 g, Oral, Daily PRN    sennosides (Senokot) tab 17.2 mg, 17.2 mg, Oral, Nightly PRN    bisacodyl (Dulcolax) 10 MG rectal suppository 10 mg, 10 mg, Rectal, Daily PRN    fleet enema (Fleet) rectal enema 133 mL, 1 enema, Rectal, Once PRN    ondansetron (Zofran) 4 MG/2ML injection 4 mg, 4 mg, Intravenous, Q6H PRN    prochlorperazine (Compazine) 10  MG/2ML injection 5 mg, 5 mg, Intravenous, Q8H PRN    benzonatate (Tessalon) cap 200 mg, 200 mg, Oral, TID PRN    glycerin-hypromellose- (Artificial Tears) 0.2-0.2-1 % ophthalmic solution 1 drop, 1 drop, Both Eyes, QID PRN    sodium chloride (Saline Mist) 0.65 % nasal solution 1 spray, 1 spray, Each Nare, Q3H PRN  [6]   Patient Active Problem List  Diagnosis    Ureteropelvic junction obstruct, congenital    Recurrent kidney stones    Hydronephrosis of left kidney    Flank pain    Hydronephrosis, unspecified hydronephrosis type    Retroperitoneal fibrosis    Ureteral stent retained    Left flank pain    Ureteral colic    Ureteral stricture, left    Other chronic pain    Congenital obstruction of ureteropelvic junction    Recurrent nephrolithiasis    Acute cystitis with hematuria    Ureteral stent present    Acute UTI    H/O insertion of nephrostomy tube    Intractable pain

## 2025-07-23 NOTE — CM/SW NOTE
[facility]icar needed for transport home. Called [facility] and set medicar up for ETA between 20-30 min. PCS complete. Informed RN.

## 2025-07-28 ENCOUNTER — APPOINTMENT (OUTPATIENT)
Dept: ULTRASOUND IMAGING | Facility: HOSPITAL | Age: 35
End: 2025-07-28
Attending: EMERGENCY MEDICINE

## 2025-07-28 ENCOUNTER — HOSPITAL ENCOUNTER (EMERGENCY)
Facility: HOSPITAL | Age: 35
Discharge: HOME | End: 2025-07-28
Attending: EMERGENCY MEDICINE

## 2025-07-28 VITALS
OXYGEN SATURATION: 100 % | DIASTOLIC BLOOD PRESSURE: 78 MMHG | RESPIRATION RATE: 14 BRPM | HEART RATE: 77 BPM | SYSTOLIC BLOOD PRESSURE: 129 MMHG | BODY MASS INDEX: 22.53 KG/M2 | WEIGHT: 170 LBS | TEMPERATURE: 98 F | HEIGHT: 73 IN

## 2025-07-28 DIAGNOSIS — R10.9 FLANK PAIN: Primary | ICD-10-CM

## 2025-07-28 DIAGNOSIS — Q62.39: ICD-10-CM

## 2025-07-28 LAB
ALBUMIN SERPL-MCNC: 4.8 G/DL (ref 3.2–4.8)
ALBUMIN/GLOB SERPL: 1.7 (ref 1–2)
ALP LIVER SERPL-CCNC: 85 U/L (ref 45–117)
ALT SERPL-CCNC: 17 U/L (ref 10–49)
ANION GAP SERPL CALC-SCNC: 6 MMOL/L (ref 0–18)
AST SERPL-CCNC: 25 U/L (ref ?–34)
BASOPHILS # BLD AUTO: 0.04 X10(3) UL (ref 0–0.2)
BASOPHILS NFR BLD AUTO: 0.3 %
BILIRUB SERPL-MCNC: 1 MG/DL (ref 0.3–1.2)
BILIRUB UR QL STRIP.AUTO: NEGATIVE
BUN BLD-MCNC: 8 MG/DL (ref 9–23)
CALCIUM BLD-MCNC: 9 MG/DL (ref 8.7–10.6)
CHLORIDE SERPL-SCNC: 103 MMOL/L (ref 98–112)
CO2 SERPL-SCNC: 29 MMOL/L (ref 21–32)
COLOR UR AUTO: YELLOW
CREAT BLD-MCNC: 0.99 MG/DL (ref 0.7–1.3)
EGFRCR SERPLBLD CKD-EPI 2021: 103 ML/MIN/1.73M2 (ref 60–?)
EOSINOPHIL # BLD AUTO: 0.07 X10(3) UL (ref 0–0.7)
EOSINOPHIL NFR BLD AUTO: 0.5 %
ERYTHROCYTE [DISTWIDTH] IN BLOOD BY AUTOMATED COUNT: 12 %
GLOBULIN PLAS-MCNC: 2.9 G/DL (ref 2–3.5)
GLUCOSE BLD-MCNC: 109 MG/DL (ref 70–99)
GLUCOSE UR STRIP.AUTO-MCNC: NORMAL MG/DL
HCT VFR BLD AUTO: 41.7 % (ref 39–53)
HGB BLD-MCNC: 14.9 G/DL (ref 13–17.5)
IMM GRANULOCYTES # BLD AUTO: 0.04 X10(3) UL (ref 0–1)
IMM GRANULOCYTES NFR BLD: 0.3 %
KETONES UR STRIP.AUTO-MCNC: NEGATIVE MG/DL
LEUKOCYTE ESTERASE UR QL STRIP.AUTO: 250
LIPASE SERPL-CCNC: 36 U/L (ref 12–53)
LYMPHOCYTES # BLD AUTO: 1.02 X10(3) UL (ref 1–4)
LYMPHOCYTES NFR BLD AUTO: 7.3 %
MCH RBC QN AUTO: 30.8 PG (ref 26–34)
MCHC RBC AUTO-ENTMCNC: 35.7 G/DL (ref 31–37)
MCV RBC AUTO: 86.2 FL (ref 80–100)
MONOCYTES # BLD AUTO: 0.99 X10(3) UL (ref 0.1–1)
MONOCYTES NFR BLD AUTO: 7.1 %
NEUTROPHILS # BLD AUTO: 11.85 X10 (3) UL (ref 1.5–7.7)
NEUTROPHILS # BLD AUTO: 11.85 X10(3) UL (ref 1.5–7.7)
NEUTROPHILS NFR BLD AUTO: 84.5 %
NITRITE UR QL STRIP.AUTO: NEGATIVE
OSMOLALITY SERPL CALC.SUM OF ELEC: 285 MOSM/KG (ref 275–295)
PH UR STRIP.AUTO: 7 (ref 5–8)
PLATELET # BLD AUTO: 206 10(3)UL (ref 150–450)
POTASSIUM SERPL-SCNC: 4.7 MMOL/L (ref 3.5–5.1)
PROT SERPL-MCNC: 7.7 G/DL (ref 5.7–8.2)
RBC # BLD AUTO: 4.84 X10(6)UL (ref 4.3–5.7)
RBC UR QL AUTO: NEGATIVE
SODIUM SERPL-SCNC: 138 MMOL/L (ref 136–145)
SP GR UR STRIP.AUTO: 1.02 (ref 1–1.03)
UROBILINOGEN UR STRIP.AUTO-MCNC: NORMAL MG/DL
WBC # BLD AUTO: 14 X10(3) UL (ref 4–11)
WBC #/AREA URNS AUTO: >50 /HPF

## 2025-07-28 PROCEDURE — 87077 CULTURE AEROBIC IDENTIFY: CPT | Performed by: EMERGENCY MEDICINE

## 2025-07-28 PROCEDURE — 76770 US EXAM ABDO BACK WALL COMP: CPT | Performed by: EMERGENCY MEDICINE

## 2025-07-28 PROCEDURE — 87186 SC STD MICRODIL/AGAR DIL: CPT | Performed by: EMERGENCY MEDICINE

## 2025-07-28 PROCEDURE — 96361 HYDRATE IV INFUSION ADD-ON: CPT

## 2025-07-28 PROCEDURE — 87086 URINE CULTURE/COLONY COUNT: CPT | Performed by: EMERGENCY MEDICINE

## 2025-07-28 PROCEDURE — 96375 TX/PRO/DX INJ NEW DRUG ADDON: CPT

## 2025-07-28 PROCEDURE — 83690 ASSAY OF LIPASE: CPT | Performed by: EMERGENCY MEDICINE

## 2025-07-28 PROCEDURE — 99285 EMERGENCY DEPT VISIT HI MDM: CPT

## 2025-07-28 PROCEDURE — 85025 COMPLETE CBC W/AUTO DIFF WBC: CPT | Performed by: EMERGENCY MEDICINE

## 2025-07-28 PROCEDURE — 81001 URINALYSIS AUTO W/SCOPE: CPT | Performed by: EMERGENCY MEDICINE

## 2025-07-28 PROCEDURE — 80053 COMPREHEN METABOLIC PANEL: CPT | Performed by: EMERGENCY MEDICINE

## 2025-07-28 PROCEDURE — 96374 THER/PROPH/DIAG INJ IV PUSH: CPT

## 2025-07-28 PROCEDURE — 99284 EMERGENCY DEPT VISIT MOD MDM: CPT

## 2025-07-28 RX ORDER — MORPHINE SULFATE 4 MG/ML
4 INJECTION, SOLUTION INTRAMUSCULAR; INTRAVENOUS ONCE
Status: COMPLETED | OUTPATIENT
Start: 2025-07-28 | End: 2025-07-28

## 2025-07-28 RX ORDER — KETOROLAC TROMETHAMINE 15 MG/ML
15 INJECTION, SOLUTION INTRAMUSCULAR; INTRAVENOUS ONCE
Status: COMPLETED | OUTPATIENT
Start: 2025-07-28 | End: 2025-07-28

## 2025-07-28 RX ORDER — HYDROCODONE BITARTRATE AND ACETAMINOPHEN 5; 325 MG/1; MG/1
1-2 TABLET ORAL EVERY 6 HOURS PRN
Qty: 10 TABLET | Refills: 0 | Status: SHIPPED | OUTPATIENT
Start: 2025-07-28 | End: 2025-08-04

## 2025-07-28 RX ORDER — ONDANSETRON 2 MG/ML
4 INJECTION INTRAMUSCULAR; INTRAVENOUS ONCE
Status: COMPLETED | OUTPATIENT
Start: 2025-07-28 | End: 2025-07-28

## 2025-07-28 RX ORDER — AMOXICILLIN 875 MG/1
875 TABLET, COATED ORAL 2 TIMES DAILY
Qty: 14 TABLET | Refills: 0 | Status: SHIPPED | OUTPATIENT
Start: 2025-07-28 | End: 2025-07-30

## 2025-07-30 RX ORDER — LEVOFLOXACIN 750 MG/1
750 TABLET, FILM COATED ORAL DAILY
Qty: 7 TABLET | Refills: 0 | Status: SHIPPED | OUTPATIENT
Start: 2025-07-30 | End: 2025-08-06

## (undated) DEVICE — CANNULA SEAL

## (undated) DEVICE — VLOC

## (undated) DEVICE — SUT VICRYL 0 UR-6 J603H

## (undated) DEVICE — SYRINGE MED 20ML STD CLR PLAS LL TIP N CTRL

## (undated) DEVICE — MONOPOLAR CURVED SCISSORS: Brand: ENDOWRIST

## (undated) DEVICE — SOLUTION IRRIG 3000ML 0.9% NACL FLX CONT

## (undated) DEVICE — UROLOGY DRAIN BAG

## (undated) DEVICE — CYSTO CDS-LF: Brand: MEDLINE INDUSTRIES, INC.

## (undated) DEVICE — SOLUTION IV 1000ML DIL ST H2O

## (undated) DEVICE — INSUFFLATION NEEDLE TO ESTABLISH PNEUMOPERITONEUM.: Brand: INSUFFLATION NEEDLE

## (undated) DEVICE — AIRSEAL BIFURCATED FILTERED TUBESET WITH ACTIVATED CHARCOAL FILTER: Brand: AIRSEAL

## (undated) DEVICE — BAG DRAIN INFECTION CNTRL 2000

## (undated) DEVICE — Device

## (undated) DEVICE — SUT VICRYL 3-0 SH J416H

## (undated) DEVICE — SYRINGE MED 10ML LL TIP W/O SFTY DISP

## (undated) DEVICE — SOLUTION  .9 1000ML BTL

## (undated) DEVICE — STERILE DRAPE FOR USE WITH SITUATE ROOM SCANNER: Brand: SITUATE

## (undated) DEVICE — ELECTRODE ESURG 2.75IN EZ CLN

## (undated) DEVICE — VISUALIZATION SYSTEM: Brand: CLEARIFY

## (undated) DEVICE — MEGADYNE ELECTRODE ADULT PT RT

## (undated) DEVICE — SUT MONOCRYL 4-0 PS-2 Y496G

## (undated) DEVICE — ZZ-DISC-SUB-405166-SUT COAT VCRL 3-0 27IN SH ABSRB UD 26MM 1/2

## (undated) DEVICE — 3M™ TEGADERM™ TRANSPARENT FILM DRESSING FRAME STYLE, 1626W, 4 IN X 4-3/4 IN (10 CM X 12 CM), 50/CT 4CT/CASE: Brand: 3M™ TEGADERM™

## (undated) DEVICE — COLUMN DRAPE

## (undated) DEVICE — SEAL

## (undated) DEVICE — SLEEVE COMPR MD KNEE LEN SGL USE KENDALL SCD

## (undated) DEVICE — CATHETER URET 5FR L70CM FLX OPN TIP NONPORTED

## (undated) DEVICE — ZIPWIRE GUIDEWIRE .035X150 STR

## (undated) DEVICE — STERILE H2O FOR IRRIG 3000 ML BAG

## (undated) DEVICE — PACK PBDS CYSTOSCOPY

## (undated) DEVICE — NITINOL WIRE WITH HYDROPHILIC TIP: Brand: SENSOR

## (undated) DEVICE — SUT VICRYL 4-0 RB-1 J304H

## (undated) DEVICE — SEAL BIOPSY PORT ACMI BX BLACK CAP

## (undated) DEVICE — ENDOSCOPIC VALVE WITH ADAPTER.: Brand: SURSEAL® II

## (undated) DEVICE — LARGE NEEDLE DRIVER: Brand: ENDOWRIST

## (undated) DEVICE — HANDLE SUR BLU PLAS LT FLX SLIP ON ST DISP

## (undated) DEVICE — FIBER LSR 200UM 2J 80HZ 60W DL FOR LITHO

## (undated) DEVICE — PACK CUSTOM CYSTO

## (undated) DEVICE — SOL H2O 3000ML IRRIG

## (undated) DEVICE — CATHETER URET 5FRX28IN CONE TIP POLYUR DISP

## (undated) DEVICE — SLEEVE KENDALL SCD EXPRESS MED

## (undated) DEVICE — PAD,EYE,LARGE,2 1/8"X2 5/8",STERILE,LF: Brand: MEDLINE

## (undated) DEVICE — GUIDEWIRE .035X150 STR ZIPWIRE

## (undated) DEVICE — 20 ML SYRINGE LUER-LOCK TIP: Brand: MONOJECT

## (undated) DEVICE — 1010 S-DRAPE TOWEL DRAPE 10/BX: Brand: STERI-DRAPE™

## (undated) DEVICE — SINGLE ACTION PUMPING SYSTEM

## (undated) DEVICE — STERILE POLYISOPRENE POWDER-FREE SURGICAL GLOVES: Brand: PROTEXIS

## (undated) DEVICE — TIGERTAIL 5F FLXTIP 70CM

## (undated) DEVICE — SUT COAT VCRL 4-0 27IN RB-1 ABSRB VLT 17MM 1/

## (undated) DEVICE — SKIN MARKER DUAL TIP WITH RULER CAP AND LABELS: Brand: DEVON

## (undated) DEVICE — ROBOTIC GENERAL: Brand: MEDLINE INDUSTRIES, INC.

## (undated) DEVICE — BLADELESS OBTURATOR: Brand: WECK VISTA

## (undated) DEVICE — GLOVE SUR 8 SENSICARE NEOPR PWD F

## (undated) DEVICE — 3M™ TEGADERM™ TRANSPARENT FILM DRESSING, 1626W, 4 IN X 4-3/4 IN (10 CM X 12 CM), 50 EACH/CARTON, 4 CARTON/CASE: Brand: 3M™ TEGADERM™

## (undated) DEVICE — MEDI-VAC NON-CONDUCTIVE SUCTION TUBING: Brand: CARDINAL HEALTH

## (undated) DEVICE — SURGICEL SNOW ABS 4X4

## (undated) DEVICE — ADHESIVE LIQ 2/3ML VI MASTISOL

## (undated) DEVICE — FENESTRATED BIPOLAR FORCEPS: Brand: ENDOWRIST

## (undated) DEVICE — GAUZE SPONGES,USP TYPE VII GAUZE, 12 PLY: Brand: CURITY

## (undated) DEVICE — GLOVE SUR 7.5 SENSICARE PI PIP CRM PWD F

## (undated) DEVICE — AIRSEAL 12 MM ACCESS PORT AND PALM GRIP OBTURATOR WITH BLADELESS OPTICAL TIP, 120 MM LENGTH: Brand: AIRSEAL

## (undated) DEVICE — SOL  .9 1000ML BAG

## (undated) DEVICE — BAG DRNGE 2000ML URIN INF CTRL ANTIREFLX

## (undated) DEVICE — ARM DRAPE

## (undated) DEVICE — CASED DISP BIPOLAR CORD

## (undated) DEVICE — 6F 70CM BARD TIGERTAIL FLEXIBLE TIP URETERAL CATHETERS

## (undated) DEVICE — HEAD AND NECK CDS-LF: Brand: MEDLINE INDUSTRIES, INC.

## (undated) DEVICE — INTENDED TO BE USED TO OCCLUDE, RETRACT AND IDENTIFY ARTERIES, VEINS, TENDONS AND NERVES IN SURGICAL PROCEDURES: Brand: STERION®  VESSEL LOOP

## (undated) DEVICE — ROBOTIC UROLOGY PROSTATE: Brand: MEDLINE INDUSTRIES, INC.

## (undated) DEVICE — SUT CHROMIC GUT 3-0 SH G122H

## (undated) DEVICE — SUT CHROMIC GUT 4-0 SH G121H

## (undated) DEVICE — LIGHT HANDLE

## (undated) DEVICE — SUT PROLENE 2-0 CT-2 8411H

## (undated) DEVICE — SUT SILK 2-0 FS 685G

## (undated) DEVICE — SINGLE-USE DIGITAL FLEXIBLE URETEROSCOPE: Brand: LITHOVUE

## (undated) DEVICE — SUT MCRYL 4-0 18IN PS-2 ABSRB UD 19MM 3/8 CIR

## (undated) DEVICE — SUT PERMA- 0 30IN KS NABSRB BLK 60MM STR R

## (undated) DEVICE — SUT VICRYL 0 J608H

## (undated) DEVICE — 2, DISPOSABLE SUCTION/IRRIGATOR WITHOUT DISPOSABLE TIP: Brand: STRYKEFLOW

## (undated) DEVICE — 3M™ TEGADERM™ TRANSPARENT FILM DRESSING FRAME STYLE, 1624W, 2-3/8 IN X 2-3/4 IN (6 CM X 7 CM), 100/CT 4CT/CASE: Brand: 3M™ TEGADERM™

## (undated) DEVICE — CLIP HEMOLOK LARGE PURPLE

## (undated) DEVICE — ENCORE® LATEX MICRO SIZE 8, STERILE LATEX POWDER-FREE SURGICAL GLOVE: Brand: ENCORE

## (undated) DEVICE — JELLY,LUBE,STERILE,FLIP TOP,TUBE,2-OZ: Brand: MEDLINE

## (undated) DEVICE — FLEXOR, URETERAL ACCESS SHEATH WITH AQ, HYDROPHILIC COATING: Brand: FLEXOR

## (undated) DEVICE — TRI-LUMEN FILTERED TUBE SET WITH ACTIVATED CHARCOAL FILTER: Brand: AIRSEAL

## (undated) DEVICE — TAPE UMBILICAL 1/8" COTTON

## (undated) DEVICE — DAVINCI XI CLIP HEM O LOK LARGE PURPLE

## (undated) DEVICE — COTTON TAPE,PRE-CUT,2X WHITE STRANDS: Brand: UMBILICAL TAPE

## (undated) DEVICE — SNAP KOVER: Brand: UNBRANDED

## (undated) DEVICE — TOWEL,OR,DSP,ST,BLUE,DLX,2/PK,40PK/CS: Brand: MEDLINE

## (undated) DEVICE — SOLUTION IRRIG 1000ML ST H2O AQUALITE PLAS

## (undated) DEVICE — EXOFIN PRECISION PEN HIGH VISCOSITY TOPICAL SKIN ADHESIVE: Brand: EXOFIN PRECISION PEN, 1G

## (undated) DEVICE — LAPAROVUE VISIBILITY SYSTEM LAPAROSCOPIC SOLUTIONS: Brand: LAPAROVUE

## (undated) DEVICE — TIP COVER ACCESSORY

## (undated) DEVICE — PROGRASP FORCEPS: Brand: ENDOWRIST

## (undated) DEVICE — SERVICE RENTAL LSR TECH ONLY

## (undated) DEVICE — GLOVE SUR 8 SENSICARE PI PIP CRM PWD F

## (undated) DEVICE — CLOSURE EXOFIN 1.0ML

## (undated) DEVICE — SUT VCRL 0 L18IN ABSRB UD TIE POLYGLACTIN

## (undated) DEVICE — ZZ--DISC-SUB-421016-SUT VCRL 0 L27IN ABSRB VLT L26MM UR-6 5/8-

## (undated) NOTE — LETTER
[address]  [address]  Authorization for Surgical Operation and Procedure     Date:___________                                                                                                         Time:__________  I hereby authorize Surgeon(s):  Dex Jane DO, my physician and his/her assistants (if applicable), which may include medical students, residents, and/or fellows, to perform the following surgical operation/ procedure and administer such anesthesia as may be determined necessary by my physician:  Operation/Procedure name (s) Procedure(s):  CYSTOSCOPY, POSSIBLE LEFT RETROGRADE PYELOGRAM, REMOVAL OF LEFT URETERAL STENT on Augustus H Egan   2.   I recognize that during the surgical operation/procedure, unforeseen conditions may necessitate additional or different procedures than those listed above.  I, therefore, further authorize and request that the above-named surgeon, assistants, or designees perform such procedures as are, in their judgment, necessary and desirable.    3.   My surgeon/physician has discussed prior to my surgery the potential benefits, risks and side effects of this procedure; the likelihood of achieving goals; and potential problems that might occur during recuperation.  They also discussed reasonable alternatives to the procedure, including risks, benefits, and side effects related to the alternatives and risks related to not receiving this procedure.  I have had all my questions answered and I acknowledge that no guarantee has been made as to the result that may be obtained.    4.   Should the need arise during my operation/procedure, which includes change of level of care prior to discharge, I also consent to the administration of blood and/or blood products.  Further, I understand that despite careful testing and screening of blood or blood products by collecting agencies, I may still be subject to ill effects as a result of  receiving a blood transfusion and/or blood products.  The following are some, but not all, of the potential risks that can occur: fever and allergic reactions, hemolytic reactions, transmission of diseases such as Hepatitis, AIDS and Cytomegalovirus (CMV) and fluid overload.  In the event that I wish to have an autologous transfusion of my own blood, or a directed donor transfusion, I will discuss this with my physician.  Check only if Refusing Blood or Blood Products  I understand refusal of blood or blood products as deemed necessary by my physician may have serious consequences to my condition to include possible death. I hereby assume responsibility for my refusal and release the hospital, its personnel, and my physicians from any responsibility for the consequences of my refusal.          o  Refuse      5.   I authorize the use of any specimen, organs, tissues, body parts or foreign objects that may be removed from my body during the operation/procedure for diagnosis, research or teaching purposes and their subsequent disposal by hospital authorities.  I also authorize the release of specimen test results and/or written reports to my treating physician on the hospital medical staff or other referring or consulting physicians involved in my care, at the discretion of the Pathologist or my treating physician.    6.   I consent to the photographing or videotaping of the operations or procedures to be performed, including appropriate portions of my body for medical, scientific, or educational purposes, provided my identity is not revealed by the pictures or by descriptive texts accompanying them.  If the procedure has been photographed/videotaped, the surgeon will obtain the original picture, image, videotape or CD.  The hospital will not be responsible for storage, release or maintenance of the picture, image, tape or CD.    7.   I consent to the presence of a  or observers in the operating room  as deemed necessary by my physician or their designees.    8.   I recognize that in the event my procedure results in extended X-Ray/fluoroscopy time, I may develop a skin reaction.    9. If I have a Do Not Attempt Resuscitation (DNAR) order in place, that status will be suspended while in the operating room, procedural suite, and during the recovery period unless otherwise explicitly stated by me (or a person authorized to consent on my behalf). The surgeon or my attending physician will determine when the applicable recovery period ends for purposes of reinstating the DNAR order.  10. Patients having a sterilization procedure: I understand that if the procedure is successful the results will be permanent and it will therefore be impossible for me to inseminate, conceive, or bear children.  I also understand that the procedure is intended to result in sterility, although the result has not been guaranteed.   11. I acknowledge that my physician has explained sedation/analgesia administration to me including the risk and benefits I consent to the administration of sedation/analgesia as may be necessary or desirable in the judgment of my physician.    I CERTIFY THAT I HAVE READ AND FULLY UNDERSTAND THE ABOVE CONSENT TO OPERATION and/or OTHER PROCEDURE.    _________________________________________  __________________________________  Signature of Patient     Signature of Responsible Person         ___________________________________         Printed Name of Responsible Person           _________________________________                 Relationship to Patient  _________________________________________  ______________________________  Signature of Witness          Date  Time      Patient Name: Austin Mcfarland     : 1990                 Printed: 2025     Medical Record #: [id number]                     Page 1 of 2                                    [address]   [address]    Consent for Anesthesia    IAustin agree to be cared for by an anesthesiologist, who is specially trained to monitor me and give me medicine to put me to sleep or keep me comfortable during my procedure    I understand that my anesthesiologist is not an employee or agent of [facility] or [employer]. He or she works for [employer].    As the patient asking for anesthesia services, I agree to:  Allow the anesthesiologist (anesthesia doctor) to give me medicine and do additional procedures as necessary. Some examples are: Starting or using an “IV” to give me medicine, fluids or blood during my procedure, and having a breathing tube placed to help me breathe when I’m asleep (intubation). In the event that my heart stops working properly, I understand that my anesthesiologist will make every effort to sustain my life, unless otherwise directed by [facility] Do Not Resuscitate documents.  Tell my anesthesia doctor before my procedure:  If I am pregnant.  The last time that I ate or drank.  All of the medicines I take (including prescriptions, herbal supplements, and pills I can buy without a prescription (including street drugs/illegal medications). Failure to inform my anesthesiologist about these medicines may increase my risk of anesthetic complications.  If I am allergic to anything or have had a reaction to anesthesia before.  I understand how the anesthesia medicine will help me (benefits).  I understand that with any type of anesthesia medicine there are risks:  The most common risks are: nausea, vomiting, sore throat, muscle soreness, damage to my eyes, mouth, or teeth (from breathing tube placement).  Rare risks include: remembering what happened during my procedure, allergic reactions to medications, injury to my airway, heart, lungs, vision, nerves, or muscles and in extremely rare instances death.  My doctor has explained to me other choices available  to me for my care (alternatives).  Pregnant Patients (“epidural”):  I understand that the risks of having an epidural (medicine given into my back to help control pain during labor), include itching, low blood pressure, difficulty urinating, headache or slowing of the baby’s heart. Very rare risks include infection, bleeding, seizure, irregular heart rhythms and nerve injury.  Regional Anesthesia (“spinal”, “epidural”, & “nerve blocks”):  I understand that rare but potential complications include headache, bleeding, infection, seizure, irregular heart rhythms, and nerve injury.    I can change my mind about having anesthesia services at any time before I get the medicine.    _____________________________________________________________________________  Patient (or Representative) Signature/Relationship to Patient  Date   Time    _____________________________________________________________________________   Name (if used)    Language/Organization   Time    _____________________________________________________________________________  Anesthesiologist Signature     Date   Time  I have discussed the procedure and information above with the patient (or patient’s representative) and answered their questions. The patient or their representative has agreed to have anesthesia services.    _____________________________________________________________________________  Witness        Date   Time  I have verified that the signature is that of the patient or patient’s representative, and that it was signed before the procedure  Patient Name: Austin Mcfarland     : 1990                 Printed: 2025     Medical Record #: [id number]                     Page 2 of 2

## (undated) NOTE — Clinical Note
June 23, 2025    Patient: Austin Mcfarland   Date of Visit: 6/23/2025       To Whom It May Concern:    Austin Mcfarland was seen and treated in our emergency department on 6/23/2025. He {Return to school/sport/work:[id number]}.    If you have any questions or concerns, please don't hesitate to call.       Encounter Provider(s):    Roula Donato MD Akbari, Faisal, MD

## (undated) NOTE — LETTER
Date & Time: 6/27/2025, 4:11 PM  Patient: Austin Mcfarland  Encounter Provider(s):    Harrison Bowens MD Jung, Eric D, MD Joseph, Manju, MD       To Whom It May Concern:    Austin Mcfarland was seen and treated at [facility] from 6/25/2025 to 6/27/2025.    If you have any questions or concerns, please do not hesitate to call.        _____________________________  Physician/APC Signature

## (undated) NOTE — Clinical Note
[address]  [address]  Authorization for Surgical Operation and Procedure     Date:___________                                                                                                         Time:__________  1. I hereby authorize Surgeon(s):  Quirino Toth MD, my physician and his/her assistants (if applicable), which may include medical students, residents, and/or fellows, to perform the following surgical operation/ procedure and administer such anesthesia as may be determined necessary by my physician:  Operation/Procedure name (s) Procedure(s):  CYSTOSCOPY, LEFT STENT INSERTION on Augustus H Egan   2.   I recognize that during the surgical operation/procedure, unforeseen conditions may necessitate additional or different procedures than those listed above.  I, therefore, further authorize and request that the above-named surgeon, assistants, or designees perform such procedures as are, in their judgment, necessary and desirable.    3.   My surgeon/physician has discussed prior to my surgery the potential benefits, risks and side effects of this procedure; the likelihood of achieving goals; and potential problems that might occur during recuperation.  They also discussed reasonable alternatives to the procedure, including risks, benefits, and side effects related to the alternatives and risks related to not receiving this procedure.  I have had all my questions answered and I acknowledge that no guarantee has been made as to the result that may be obtained.    4.   Should the need arise during my operation/procedure, which includes change of level of care prior to discharge, I also consent to the administration of blood and/or blood products.  Further, I understand that despite careful testing and screening of blood or blood products by collecting agencies, I may still be subject to ill effects as a result of receiving a blood transfusion and/or blood  products.  The following are some, but not all, of the potential risks that can occur: fever and allergic reactions, hemolytic reactions, transmission of diseases such as Hepatitis, AIDS and Cytomegalovirus (CMV) and fluid overload.  In the event that I wish to have an autologous transfusion of my own blood, or a directed donor transfusion, I will discuss this with my physician.  Check only if Refusing Blood or Blood Products  I understand refusal of blood or blood products as deemed necessary by my physician may have serious consequences to my condition to include possible death. I hereby assume responsibility for my refusal and release the hospital, its personnel, and my physicians from any responsibility for the consequences of my refusal.          o  Refuse      5.   I authorize the use of any specimen, organs, tissues, body parts or foreign objects that may be removed from my body during the operation/procedure for diagnosis, research or teaching purposes and their subsequent disposal by hospital authorities.  I also authorize the release of specimen test results and/or written reports to my treating physician on the hospital medical staff or other referring or consulting physicians involved in my care, at the discretion of the Pathologist or my treating physician.    6.   I consent to the photographing or videotaping of the operations or procedures to be performed, including appropriate portions of my body for medical, scientific, or educational purposes, provided my identity is not revealed by the pictures or by descriptive texts accompanying them.  If the procedure has been photographed/videotaped, the surgeon will obtain the original picture, image, videotape or CD.  The hospital will not be responsible for storage, release or maintenance of the picture, image, tape or CD.    7.   I consent to the presence of a  or observers in the operating room as deemed necessary by my physician or  their designees.    8.   I recognize that in the event my procedure results in extended X-Ray/fluoroscopy time, I may develop a skin reaction.    9. If I have a Do Not Attempt Resuscitation (DNAR) order in place, that status will be suspended while in the operating room, procedural suite, and during the recovery period unless otherwise explicitly stated by me (or a person authorized to consent on my behalf). The surgeon or my attending physician will determine when the applicable recovery period ends for purposes of reinstating the DNAR order.  10. Patients having a sterilization procedure: I understand that if the procedure is successful the results will be permanent and it will therefore be impossible for me to inseminate, conceive, or bear children.  I also understand that the procedure is intended to result in sterility, although the result has not been guaranteed.   11. I acknowledge that my physician has explained sedation/analgesia administration to me including the risk and benefits I consent to the administration of sedation/analgesia as may be necessary or desirable in the judgment of my physician.    I CERTIFY THAT I HAVE READ AND FULLY UNDERSTAND THE ABOVE CONSENT TO OPERATION and/or OTHER PROCEDURE.    _________________________________________  __________________________________  Signature of Patient     Signature of Responsible Person         ___________________________________         Printed Name of Responsible Person           _________________________________                 Relationship to Patient  _________________________________________  ______________________________  Signature of Witness          Date  Time      Patient Name: Austin Mcfarland     : 1990                 Printed: 2025     Medical Record #: [id number]                     Page [address] of [address] [place] [address]  [id number]    Consent for Anesthesia    1. I,  Austin Mcfarland agree to be cared for by an anesthesiologist, who is specially trained to monitor me and give me medicine to put me to sleep or keep me comfortable during my procedure    I understand that my anesthesiologist is not an employee or agent of [facility] or [employer]. He or she works for [employer].    2. As the patient asking for anesthesia services, I agree to:  a. Allow the anesthesiologist (anesthesia doctor) to give me medicine and do additional procedures as necessary. Some examples are: Starting or using an “IV” to give me medicine, fluids or blood during my procedure, and having a breathing tube placed to help me breathe when I’m asleep (intubation). In the event that my heart stops working properly, I understand that my anesthesiologist will make every effort to sustain my life, unless otherwise directed by [facility] Do Not Resuscitate documents.  b. Tell my anesthesia doctor before my procedure:  i. If I am pregnant.  ii. The last time that I ate or drank.  iii. All of the medicines I take (including prescriptions, herbal supplements, and pills I can buy without a prescription (including street drugs/illegal medications). Failure to inform my anesthesiologist about these medicines may increase my risk of anesthetic complications.  iv. If I am allergic to anything or have had a reaction to anesthesia before.  3. I understand how the anesthesia medicine will help me (benefits).  4. I understand that with any type of anesthesia medicine there are risks:  a. The most common risks are: nausea, vomiting, sore throat, muscle soreness, damage to my eyes, mouth, or teeth (from breathing tube placement).  b. Rare risks include: remembering what happened during my procedure, allergic reactions to medications, injury to my airway, heart, lungs, vision, nerves, or muscles and in extremely rare instances death.  5. My doctor has explained to me other choices  available to me for my care (alternatives).  6. Pregnant Patients (“epidural”):  I understand that the risks of having an epidural (medicine given into my back to help control pain during labor), include itching, low blood pressure, difficulty urinating, headache or slowing of the baby’s heart. Very rare risks include infection, bleeding, seizure, irregular heart rhythms and nerve injury.  7. Regional Anesthesia (“spinal”, “epidural”, & “nerve blocks”):  I understand that rare but potential complications include headache, bleeding, infection, seizure, irregular heart rhythms, and nerve injury.    I can change my mind about having anesthesia services at any time before I get the medicine.    _____________________________________________________________________________  Patient (or Representative) Signature/Relationship to Patient  Date   Time    _____________________________________________________________________________   Name (if used)    Language/Organization   Time    _____________________________________________________________________________  Anesthesiologist Signature     Date   Time  I have discussed the procedure and information above with the patient (or patient’s representative) and answered their questions. The patient or their representative has agreed to have anesthesia services.    _____________________________________________________________________________  Witness        Date   Time  I have verified that the signature is that of the patient or patient’s representative, and that it was signed before the procedure  Patient Name: Austin Mcfarland     : 1990                 Printed: 2025     Medical Record #: [id number]                     Page 2 of 2

## (undated) NOTE — LETTER
[address]  [address]  Consent for Procedure/Sedation  Date: 6/26/25         Time: 1030    I hereby authorize Dr Barragan, my physician and his/her assistants (if applicable), which may include medical students, residents, and/or fellows, to perform the following surgical operation/ procedure and administer such anesthesia as may be determined necessary by my physician: Left Nephrostomy Tube Insertion on Augustus H Egan  2.   I recognize that during the surgical operation/procedure, unforeseen conditions may necessitate additional or different procedures than those listed above.  I, therefore, further authorize and request that the above-named surgeon, assistants, or designees perform such procedures as are, in their judgment, necessary and desirable.    3.   My surgeon/physician has discussed prior to my surgery the potential benefits, risks and side effects of this procedure; the likelihood of achieving goals; and potential problems that might occur during recuperation.  They also discussed reasonable alternatives to the procedure, including risks, benefits, and side effects related to the alternatives and risks related to not receiving this procedure.  I have had all my questions answered and I acknowledge that no guarantee has been made as to the result that may be obtained.    4.   Should the need arise during my operation/procedure, which includes change of level of care prior to discharge, I also consent to the administration of blood and/or blood products.  Further, I understand that despite careful testing and screening of blood or blood products by collecting agencies, I may still be subject to ill effects as a result of receiving a blood transfusion and/or blood products.  The following are some, but not all, of the potential risks that can occur: fever and allergic reactions, hemolytic reactions, transmission of diseases such as Hepatitis, AIDS and Cytomegalovirus  (CMV) and fluid overload.  In the event that I wish to have an autologous transfusion of my own blood, or a directed donor transfusion, I will discuss this with my physician.   Check only if Refusing Blood or Blood Products  I understand refusal of blood or blood products as deemed necessary by my physician may have serious consequences to my condition to include possible death. I hereby assume responsibility for my refusal and release the hospital, its personnel, and my physicians from any responsibility for the consequences of my refusal.         o  Refuse         5.   I authorize the use of any specimen, organs, tissues, body parts or foreign objects that may be removed from my body during the operation/procedure for diagnosis, research or teaching purposes and their subsequent disposal by hospital authorities.  I also authorize the release of specimen test results and/or written reports to my treating physician on the hospital medical staff or other referring or consulting physicians involved in my care, at the discretion of the Pathologist or my treating physician.    6.   I consent to the photographing or videotaping of the operations or procedures to be performed, including appropriate portions of my body for medical, scientific, or educational purposes, provided my identity is not revealed by the pictures or by descriptive texts accompanying them.  If the procedure has been photographed/videotaped, the surgeon will obtain the original picture, image, videotape or CD.  The hospital will not be responsible for storage, release or maintenance of the picture, image, tape or CD.    7.   I consent to the presence of a  or observers in the operating room as deemed necessary by my physician or their designees.    8.   I recognize that in the event my procedure results in extended X-Ray/fluoroscopy time, I may develop a skin reaction.    9. If I have a Do Not Attempt Resuscitation (DNAR) order in  place, that status will be suspended while in the operating room, procedural suite, and during the recovery period unless otherwise explicitly stated by me (or a person authorized to consent on my behalf). The surgeon or my attending physician will determine when the applicable recovery period ends for purposes of reinstating the DNAR order.  10. Patients having a sterilization procedure: I understand that if the procedure is successful the results will be permanent and it will therefore be impossible for me to inseminate, conceive, or bear children.  I also understand that the procedure is intended to result in sterility, although the result has not been guaranteed.   11. I acknowledge that my physician has explained sedation/analgesia administration to me including the risk and benefits I consent to the administration of sedation/analgesia as may be necessary or desirable in the judgment of my physician.    I CERTIFY THAT I HAVE READ AND FULLY UNDERSTAND THE ABOVE CONSENT TO OPERATION and/or OTHER PROCEDURE.        ____________________________________       _________________________________      ______________________________  Signature of Patient         Signature of Responsible Person        Printed Name of Responsible Person        ____________________________________      _________________________________      ______________________________       Signature of Witness          Relationship to Patient                       Date                                       Time  Patient Name: Austin Mcfarland  : 1990    Reviewed: 2024   Printed: 2025  Medical Record #: [id number] Page 1 of 1

## (undated) NOTE — LETTER
February 24, 2025    Patient: Austin Mcfarland   Date of Visit: 2/24/2025       To Whom It May Concern:    Austin Mcfarland was seen and treated in our emergency department on 2/24/2025. He may return to work on 2/26/2025 .    If you have any questions or concerns, please don't hesitate to call.       Encounter Provider(s):    Victorino Avendano MD

## (undated) NOTE — LETTER
[address]  [address]  Authorization for Surgical Operation and Procedure     Date:___________                                                                                                         Time:__________  I hereby authorize Surgeon(s):  Dex Jane DO, my physician and his/her assistants (if applicable), which may include medical students, residents, and/or fellows, to perform the following surgical operation/ procedure and administer such anesthesia as may be determined necessary by my physician:  Operation/Procedure name (s) Procedure(s):  CYSTOSCOPY, LEFT URETEROSCOPY WITH POSSIBLE STONE EXTRACTION, POSSIBLE LEFT RETROGRADE PYELOGRAM, REMOVAL OF LEFT URETERAL STENT on Augustus H Egan   2.   I recognize that during the surgical operation/procedure, unforeseen conditions may necessitate additional or different procedures than those listed above.  I, therefore, further authorize and request that the above-named surgeon, assistants, or designees perform such procedures as are, in their judgment, necessary and desirable.    3.   My surgeon/physician has discussed prior to my surgery the potential benefits, risks and side effects of this procedure; the likelihood of achieving goals; and potential problems that might occur during recuperation.  They also discussed reasonable alternatives to the procedure, including risks, benefits, and side effects related to the alternatives and risks related to not receiving this procedure.  I have had all my questions answered and I acknowledge that no guarantee has been made as to the result that may be obtained.    4.   Should the need arise during my operation/procedure, which includes change of level of care prior to discharge, I also consent to the administration of blood and/or blood products.  Further, I understand that despite careful testing and screening of blood or blood products by collecting agencies, I may still  be subject to ill effects as a result of receiving a blood transfusion and/or blood products.  The following are some, but not all, of the potential risks that can occur: fever and allergic reactions, hemolytic reactions, transmission of diseases such as Hepatitis, AIDS and Cytomegalovirus (CMV) and fluid overload.  In the event that I wish to have an autologous transfusion of my own blood, or a directed donor transfusion, I will discuss this with my physician.  Check only if Refusing Blood or Blood Products  I understand refusal of blood or blood products as deemed necessary by my physician may have serious consequences to my condition to include possible death. I hereby assume responsibility for my refusal and release the hospital, its personnel, and my physicians from any responsibility for the consequences of my refusal.          o  Refuse      5.   I authorize the use of any specimen, organs, tissues, body parts or foreign objects that may be removed from my body during the operation/procedure for diagnosis, research or teaching purposes and their subsequent disposal by hospital authorities.  I also authorize the release of specimen test results and/or written reports to my treating physician on the hospital medical staff or other referring or consulting physicians involved in my care, at the discretion of the Pathologist or my treating physician.    6.   I consent to the photographing or videotaping of the operations or procedures to be performed, including appropriate portions of my body for medical, scientific, or educational purposes, provided my identity is not revealed by the pictures or by descriptive texts accompanying them.  If the procedure has been photographed/videotaped, the surgeon will obtain the original picture, image, videotape or CD.  The hospital will not be responsible for storage, release or maintenance of the picture, image, tape or CD.    7.   I consent to the presence of a product  specialist or observers in the operating room as deemed necessary by my physician or their designees.    8.   I recognize that in the event my procedure results in extended X-Ray/fluoroscopy time, I may develop a skin reaction.    9. If I have a Do Not Attempt Resuscitation (DNAR) order in place, that status will be suspended while in the operating room, procedural suite, and during the recovery period unless otherwise explicitly stated by me (or a person authorized to consent on my behalf). The surgeon or my attending physician will determine when the applicable recovery period ends for purposes of reinstating the DNAR order.  10. Patients having a sterilization procedure: I understand that if the procedure is successful the results will be permanent and it will therefore be impossible for me to inseminate, conceive, or bear children.  I also understand that the procedure is intended to result in sterility, although the result has not been guaranteed.   11. I acknowledge that my physician has explained sedation/analgesia administration to me including the risk and benefits I consent to the administration of sedation/analgesia as may be necessary or desirable in the judgment of my physician.    I CERTIFY THAT I HAVE READ AND FULLY UNDERSTAND THE ABOVE CONSENT TO OPERATION and/or OTHER PROCEDURE.    _________________________________________  __________________________________  Signature of Patient     Signature of Responsible Person         ___________________________________         Printed Name of Responsible Person           _________________________________                 Relationship to Patient  _________________________________________  ______________________________  Signature of Witness          Date  Time      Patient Name: Austin Mcfarland     : 1990                 Printed: 2025     Medical Record #: [id number]                     Page 1 of 2                                    Edward  [address]  [address]    Consent for Anesthesia    IAustin agree to be cared for by an anesthesiologist, who is specially trained to monitor me and give me medicine to put me to sleep or keep me comfortable during my procedure    I understand that my anesthesiologist is not an employee or agent of [facility] or [employer]. He or she works for [employer].    As the patient asking for anesthesia services, I agree to:  Allow the anesthesiologist (anesthesia doctor) to give me medicine and do additional procedures as necessary. Some examples are: Starting or using an “IV” to give me medicine, fluids or blood during my procedure, and having a breathing tube placed to help me breathe when I’m asleep (intubation). In the event that my heart stops working properly, I understand that my anesthesiologist will make every effort to sustain my life, unless otherwise directed by [facility] Do Not Resuscitate documents.  Tell my anesthesia doctor before my procedure:  If I am pregnant.  The last time that I ate or drank.  All of the medicines I take (including prescriptions, herbal supplements, and pills I can buy without a prescription (including street drugs/illegal medications). Failure to inform my anesthesiologist about these medicines may increase my risk of anesthetic complications.  If I am allergic to anything or have had a reaction to anesthesia before.  I understand how the anesthesia medicine will help me (benefits).  I understand that with any type of anesthesia medicine there are risks:  The most common risks are: nausea, vomiting, sore throat, muscle soreness, damage to my eyes, mouth, or teeth (from breathing tube placement).  Rare risks include: remembering what happened during my procedure, allergic reactions to medications, injury to my airway, heart, lungs, vision, nerves, or muscles and in extremely rare instances death.  My  doctor has explained to me other choices available to me for my care (alternatives).  Pregnant Patients (“epidural”):  I understand that the risks of having an epidural (medicine given into my back to help control pain during labor), include itching, low blood pressure, difficulty urinating, headache or slowing of the baby’s heart. Very rare risks include infection, bleeding, seizure, irregular heart rhythms and nerve injury.  Regional Anesthesia (“spinal”, “epidural”, & “nerve blocks”):  I understand that rare but potential complications include headache, bleeding, infection, seizure, irregular heart rhythms, and nerve injury.    I can change my mind about having anesthesia services at any time before I get the medicine.    _____________________________________________________________________________  Patient (or Representative) Signature/Relationship to Patient  Date   Time    _____________________________________________________________________________   Name (if used)    Language/Organization   Time    _____________________________________________________________________________  Anesthesiologist Signature     Date   Time  I have discussed the procedure and information above with the patient (or patient’s representative) and answered their questions. The patient or their representative has agreed to have anesthesia services.    _____________________________________________________________________________  Witness        Date   Time  I have verified that the signature is that of the patient or patient’s representative, and that it was signed before the procedure  Patient Name: Austin Mcfarland     : 1990                 Printed: 2025     Medical Record #: [id number]                     Page 2 of 2

## (undated) NOTE — LETTER
[address]  [address]  Authorization for Surgical Operation and Procedure     Date:___________                                                                                                         Time:__________  I hereby authorize Surgeon(s):  Charlene Johnson MD, my physician and his/her assistants (if applicable), which may include medical students, residents, and/or fellows, to perform the following surgical operation/ procedure and administer such anesthesia as may be determined necessary by my physician:  Operation/Procedure name (s) Procedure(s):  CYSTOSCOPY, LEFT RETROGRADE PYLEOGRAM, LEFT URETEROSCOPY, LASER LITHOTRIPSY, LEFT STENT EXCHANGE on [address] H Egan   2.   I recognize that during the surgical operation/procedure, unforeseen conditions may necessitate additional or different procedures than those listed above.  I, therefore, further authorize and request that the above-named surgeon, assistants, or designees perform such procedures as are, in their judgment, necessary and desirable.    3.   My surgeon/physician has discussed prior to my surgery the potential benefits, risks and side effects of this procedure; the likelihood of achieving goals; and potential problems that might occur during recuperation.  They also discussed reasonable alternatives to the procedure, including risks, benefits, and side effects related to the alternatives and risks related to not receiving this procedure.  I have had all my questions answered and I acknowledge that no guarantee has been made as to the result that may be obtained.    4.   Should the need arise during my operation/procedure, which includes change of level of care prior to discharge, I also consent to the administration of blood and/or blood products.  Further, I understand that despite careful testing and screening of blood or blood products by collecting agencies, I may still be subject to ill effects as a  result of receiving a blood transfusion and/or blood products.  The following are some, but not all, of the potential risks that can occur: fever and allergic reactions, hemolytic reactions, transmission of diseases such as Hepatitis, AIDS and Cytomegalovirus (CMV) and fluid overload.  In the event that I wish to have an autologous transfusion of my own blood, or a directed donor transfusion, I will discuss this with my physician.  Check only if Refusing Blood or Blood Products  I understand refusal of blood or blood products as deemed necessary by my physician may have serious consequences to my condition to include possible death. I hereby assume responsibility for my refusal and release the hospital, its personnel, and my physicians from any responsibility for the consequences of my refusal.          o  Refuse      5.   I authorize the use of any specimen, organs, tissues, body parts or foreign objects that may be removed from my body during the operation/procedure for diagnosis, research or teaching purposes and their subsequent disposal by hospital authorities.  I also authorize the release of specimen test results and/or written reports to my treating physician on the hospital medical staff or other referring or consulting physicians involved in my care, at the discretion of the Pathologist or my treating physician.    6.   I consent to the photographing or videotaping of the operations or procedures to be performed, including appropriate portions of my body for medical, scientific, or educational purposes, provided my identity is not revealed by the pictures or by descriptive texts accompanying them.  If the procedure has been photographed/videotaped, the surgeon will obtain the original picture, image, videotape or CD.  The hospital will not be responsible for storage, release or maintenance of the picture, image, tape or CD.    7.   I consent to the presence of a  or observers in the  operating room as deemed necessary by my physician or their designees.    8.   I recognize that in the event my procedure results in extended X-Ray/fluoroscopy time, I may develop a skin reaction.    9. If I have a Do Not Attempt Resuscitation (DNAR) order in place, that status will be suspended while in the operating room, procedural suite, and during the recovery period unless otherwise explicitly stated by me (or a person authorized to consent on my behalf). The surgeon or my attending physician will determine when the applicable recovery period ends for purposes of reinstating the DNAR order.  10. Patients having a sterilization procedure: I understand that if the procedure is successful the results will be permanent and it will therefore be impossible for me to inseminate, conceive, or bear children.  I also understand that the procedure is intended to result in sterility, although the result has not been guaranteed.   11. I acknowledge that my physician has explained sedation/analgesia administration to me including the risk and benefits I consent to the administration of sedation/analgesia as may be necessary or desirable in the judgment of my physician.    I CERTIFY THAT I HAVE READ AND FULLY UNDERSTAND THE ABOVE CONSENT TO OPERATION and/or OTHER PROCEDURE.    _________________________________________  __________________________________  Signature of Patient     Signature of Responsible Person         ___________________________________         Printed Name of Responsible Person           _________________________________                 Relationship to Patient  _________________________________________  ______________________________  Signature of Witness          Date  Time      Patient Name: Austin Mcfarland     : 1990                 Printed: 2025     Medical Record #: [id number]                     Page 1 of 2                                    [address]  [place]  [address]    Consent for Anesthesia    IAustin agree to be cared for by an anesthesiologist, who is specially trained to monitor me and give me medicine to put me to sleep or keep me comfortable during my procedure    I understand that my anesthesiologist is not an employee or agent of [facility] or [employer]. He or she works for [employer].    As the patient asking for anesthesia services, I agree to:  Allow the anesthesiologist (anesthesia doctor) to give me medicine and do additional procedures as necessary. Some examples are: Starting or using an “IV” to give me medicine, fluids or blood during my procedure, and having a breathing tube placed to help me breathe when I’m asleep (intubation). In the event that my heart stops working properly, I understand that my anesthesiologist will make every effort to sustain my life, unless otherwise directed by [facility] Do Not Resuscitate documents.  Tell my anesthesia doctor before my procedure:  If I am pregnant.  The last time that I ate or drank.  All of the medicines I take (including prescriptions, herbal supplements, and pills I can buy without a prescription (including street drugs/illegal medications). Failure to inform my anesthesiologist about these medicines may increase my risk of anesthetic complications.  If I am allergic to anything or have had a reaction to anesthesia before.  I understand how the anesthesia medicine will help me (benefits).  I understand that with any type of anesthesia medicine there are risks:  The most common risks are: nausea, vomiting, sore throat, muscle soreness, damage to my eyes, mouth, or teeth (from breathing tube placement).  Rare risks include: remembering what happened during my procedure, allergic reactions to medications, injury to my airway, heart, lungs, vision, nerves, or muscles and in extremely rare instances death.  My doctor has explained to me  other choices available to me for my care (alternatives).  Pregnant Patients (“epidural”):  I understand that the risks of having an epidural (medicine given into my back to help control pain during labor), include itching, low blood pressure, difficulty urinating, headache or slowing of the baby’s heart. Very rare risks include infection, bleeding, seizure, irregular heart rhythms and nerve injury.  Regional Anesthesia (“spinal”, “epidural”, & “nerve blocks”):  I understand that rare but potential complications include headache, bleeding, infection, seizure, irregular heart rhythms, and nerve injury.    I can change my mind about having anesthesia services at any time before I get the medicine.    _____________________________________________________________________________  Patient (or Representative) Signature/Relationship to Patient  Date   Time    _____________________________________________________________________________   Name (if used)    Language/Organization   Time    _____________________________________________________________________________  Anesthesiologist Signature     Date   Time  I have discussed the procedure and information above with the patient (or patient’s representative) and answered their questions. The patient or their representative has agreed to have anesthesia services.    _____________________________________________________________________________  Witness        Date   Time  I have verified that the signature is that of the patient or patient’s representative, and that it was signed before the procedure  Patient Name: Austin Mcfarland     : 1990                 Printed: 2025     Medical Record #: [id number]                     Page 2 of 2

## (undated) NOTE — LETTER
[address]  [address]  Authorization for Surgical Operation and Procedure     Date:___________                                                                                                         Time:__________  I hereby authorize Surgeon(s):  Quirino Toth MD, my physician and his/her assistants (if applicable), which may include medical students, residents, and/or fellows, to perform the following surgical operation/ procedure and administer such anesthesia as may be determined necessary by my physician:  Operation/Procedure name (s) Procedure(s):  CYSTOSCOPY, LEFT RETROGRADE PYELOGRAM, LEFTURETEROSCOPY, LEFT URETERAL STENT INSERTION on [address] Egan   2.   I recognize that during the surgical operation/procedure, unforeseen conditions may necessitate additional or different procedures than those listed above.  I, therefore, further authorize and request that the above-named surgeon, assistants, or designees perform such procedures as are, in their judgment, necessary and desirable.    3.   My surgeon/physician has discussed prior to my surgery the potential benefits, risks and side effects of this procedure; the likelihood of achieving goals; and potential problems that might occur during recuperation.  They also discussed reasonable alternatives to the procedure, including risks, benefits, and side effects related to the alternatives and risks related to not receiving this procedure.  I have had all my questions answered and I acknowledge that no guarantee has been made as to the result that may be obtained.    4.   Should the need arise during my operation/procedure, which includes change of level of care prior to discharge, I also consent to the administration of blood and/or blood products.  Further, I understand that despite careful testing and screening of blood or blood products by collecting agencies, I may still be subject to ill effects as a  result of receiving a blood transfusion and/or blood products.  The following are some, but not all, of the potential risks that can occur: fever and allergic reactions, hemolytic reactions, transmission of diseases such as Hepatitis, AIDS and Cytomegalovirus (CMV) and fluid overload.  In the event that I wish to have an autologous transfusion of my own blood, or a directed donor transfusion, I will discuss this with my physician.  Check only if Refusing Blood or Blood Products  I understand refusal of blood or blood products as deemed necessary by my physician may have serious consequences to my condition to include possible death. I hereby assume responsibility for my refusal and release the hospital, its personnel, and my physicians from any responsibility for the consequences of my refusal.          o  Refuse      5.   I authorize the use of any specimen, organs, tissues, body parts or foreign objects that may be removed from my body during the operation/procedure for diagnosis, research or teaching purposes and their subsequent disposal by hospital authorities.  I also authorize the release of specimen test results and/or written reports to my treating physician on the hospital medical staff or other referring or consulting physicians involved in my care, at the discretion of the Pathologist or my treating physician.    6.   I consent to the photographing or videotaping of the operations or procedures to be performed, including appropriate portions of my body for medical, scientific, or educational purposes, provided my identity is not revealed by the pictures or by descriptive texts accompanying them.  If the procedure has been photographed/videotaped, the surgeon will obtain the original picture, image, videotape or CD.  The hospital will not be responsible for storage, release or maintenance of the picture, image, tape or CD.    7.   I consent to the presence of a  or observers in the  operating room as deemed necessary by my physician or their designees.    8.   I recognize that in the event my procedure results in extended X-Ray/fluoroscopy time, I may develop a skin reaction.    9. If I have a Do Not Attempt Resuscitation (DNAR) order in place, that status will be suspended while in the operating room, procedural suite, and during the recovery period unless otherwise explicitly stated by me (or a person authorized to consent on my behalf). The surgeon or my attending physician will determine when the applicable recovery period ends for purposes of reinstating the DNAR order.  10. Patients having a sterilization procedure: I understand that if the procedure is successful the results will be permanent and it will therefore be impossible for me to inseminate, conceive, or bear children.  I also understand that the procedure is intended to result in sterility, although the result has not been guaranteed.   11. I acknowledge that my physician has explained sedation/analgesia administration to me including the risk and benefits I consent to the administration of sedation/analgesia as may be necessary or desirable in the judgment of my physician.    I CERTIFY THAT I HAVE READ AND FULLY UNDERSTAND THE ABOVE CONSENT TO OPERATION and/or OTHER PROCEDURE.    _________________________________________  __________________________________  Signature of Patient     Signature of Responsible Person         ___________________________________         Printed Name of Responsible Person           _________________________________                 Relationship to Patient  _________________________________________  ______________________________  Signature of Witness          Date  Time      Patient Name: Austin Mcfarland     : 1990                 Printed: 2025     Medical Record #: [id number]                     Page 1 of 2                                    [address]  [place]  [address]    Consent for Anesthesia    IAustin agree to be cared for by an anesthesiologist, who is specially trained to monitor me and give me medicine to put me to sleep or keep me comfortable during my procedure    I understand that my anesthesiologist is not an employee or agent of [facility] or [employer]. He or she works for [employer].    As the patient asking for anesthesia services, I agree to:  Allow the anesthesiologist (anesthesia doctor) to give me medicine and do additional procedures as necessary. Some examples are: Starting or using an “IV” to give me medicine, fluids or blood during my procedure, and having a breathing tube placed to help me breathe when I’m asleep (intubation). In the event that my heart stops working properly, I understand that my anesthesiologist will make every effort to sustain my life, unless otherwise directed by [facility] Do Not Resuscitate documents.  Tell my anesthesia doctor before my procedure:  If I am pregnant.  The last time that I ate or drank.  All of the medicines I take (including prescriptions, herbal supplements, and pills I can buy without a prescription (including street drugs/illegal medications). Failure to inform my anesthesiologist about these medicines may increase my risk of anesthetic complications.  If I am allergic to anything or have had a reaction to anesthesia before.  I understand how the anesthesia medicine will help me (benefits).  I understand that with any type of anesthesia medicine there are risks:  The most common risks are: nausea, vomiting, sore throat, muscle soreness, damage to my eyes, mouth, or teeth (from breathing tube placement).  Rare risks include: remembering what happened during my procedure, allergic reactions to medications, injury to my airway, heart, lungs, vision, nerves, or muscles and in extremely rare instances death.  My doctor has explained to me  other choices available to me for my care (alternatives).  Pregnant Patients (“epidural”):  I understand that the risks of having an epidural (medicine given into my back to help control pain during labor), include itching, low blood pressure, difficulty urinating, headache or slowing of the baby’s heart. Very rare risks include infection, bleeding, seizure, irregular heart rhythms and nerve injury.  Regional Anesthesia (“spinal”, “epidural”, & “nerve blocks”):  I understand that rare but potential complications include headache, bleeding, infection, seizure, irregular heart rhythms, and nerve injury.    I can change my mind about having anesthesia services at any time before I get the medicine.    _____________________________________________________________________________  Patient (or Representative) Signature/Relationship to Patient  Date   Time    _____________________________________________________________________________   Name (if used)    Language/Organization   Time    _____________________________________________________________________________  Anesthesiologist Signature     Date   Time  I have discussed the procedure and information above with the patient (or patient’s representative) and answered their questions. The patient or their representative has agreed to have anesthesia services.    _____________________________________________________________________________  Witness        Date   Time  I have verified that the signature is that of the patient or patient’s representative, and that it was signed before the procedure  Patient Name: Austin Mcfarland     : 1990                 Printed: 2025     Medical Record #: [id number]                     Page 2 of 2